# Patient Record
Sex: MALE | Race: WHITE | Employment: OTHER
[De-identification: names, ages, dates, MRNs, and addresses within clinical notes are randomized per-mention and may not be internally consistent; named-entity substitution may affect disease eponyms.]

---

## 2017-01-03 ENCOUNTER — OFFICE VISIT (OUTPATIENT)
Dept: GASTROENTEROLOGY | Facility: CLINIC | Age: 72
End: 2017-01-03

## 2017-01-03 VITALS
OXYGEN SATURATION: 95 % | HEART RATE: 67 BPM | SYSTOLIC BLOOD PRESSURE: 136 MMHG | DIASTOLIC BLOOD PRESSURE: 70 MMHG | BODY MASS INDEX: 25.08 KG/M2 | TEMPERATURE: 97.8 F | RESPIRATION RATE: 14 BRPM | WEIGHT: 150.5 LBS | HEIGHT: 65 IN

## 2017-01-03 DIAGNOSIS — L98.0 PYOGENIC GRANULOMA: ICD-10-CM

## 2017-01-03 DIAGNOSIS — K20.90 ESOPHAGITIS: ICD-10-CM

## 2017-01-03 DIAGNOSIS — K29.70 GASTRITIS WITHOUT BLEEDING, UNSPECIFIED CHRONICITY, UNSPECIFIED GASTRITIS TYPE: Primary | ICD-10-CM

## 2017-01-03 DIAGNOSIS — R13.10 DYSPHAGIA, UNSPECIFIED TYPE: ICD-10-CM

## 2017-01-03 DIAGNOSIS — R11.0 NAUSEA: ICD-10-CM

## 2017-01-03 PROCEDURE — 99214 OFFICE O/P EST MOD 30 MIN: CPT | Performed by: INTERNAL MEDICINE

## 2017-01-03 ASSESSMENT — ENCOUNTER SYMPTOMS
CONSTIPATION: 0
RESPIRATORY NEGATIVE: 1
ABDOMINAL PAIN: 0
BLOOD IN STOOL: 0
ABDOMINAL DISTENTION: 0
VOMITING: 0
NAUSEA: 1
ANAL BLEEDING: 0
DIARRHEA: 0
ALLERGIC/IMMUNOLOGIC NEGATIVE: 1
BACK PAIN: 1
RECTAL PAIN: 0

## 2017-03-01 ENCOUNTER — OFFICE VISIT (OUTPATIENT)
Dept: GASTROENTEROLOGY | Facility: CLINIC | Age: 72
End: 2017-03-01

## 2017-03-01 VITALS
OXYGEN SATURATION: 96 % | WEIGHT: 150.8 LBS | BODY MASS INDEX: 25.12 KG/M2 | HEIGHT: 65 IN | RESPIRATION RATE: 14 BRPM | HEART RATE: 64 BPM | SYSTOLIC BLOOD PRESSURE: 116 MMHG | TEMPERATURE: 98.1 F | DIASTOLIC BLOOD PRESSURE: 61 MMHG

## 2017-03-01 DIAGNOSIS — R11.0 NAUSEA: ICD-10-CM

## 2017-03-01 DIAGNOSIS — R13.10 DYSPHAGIA, UNSPECIFIED TYPE: ICD-10-CM

## 2017-03-01 DIAGNOSIS — K29.70 GASTRITIS WITHOUT BLEEDING, UNSPECIFIED CHRONICITY, UNSPECIFIED GASTRITIS TYPE: ICD-10-CM

## 2017-03-01 DIAGNOSIS — K20.90 ESOPHAGITIS: ICD-10-CM

## 2017-03-01 DIAGNOSIS — D17.79 LIPOMA OF OTHER SPECIFIED SITES: ICD-10-CM

## 2017-03-01 DIAGNOSIS — K21.9 GASTROESOPHAGEAL REFLUX DISEASE WITHOUT ESOPHAGITIS: ICD-10-CM

## 2017-03-01 DIAGNOSIS — D13.2 BRUNNER'S GLAND ADENOMA: Primary | ICD-10-CM

## 2017-03-01 PROCEDURE — G8484 FLU IMMUNIZE NO ADMIN: HCPCS | Performed by: INTERNAL MEDICINE

## 2017-03-01 PROCEDURE — 1036F TOBACCO NON-USER: CPT | Performed by: INTERNAL MEDICINE

## 2017-03-01 PROCEDURE — G8599 NO ASA/ANTIPLAT THER USE RNG: HCPCS | Performed by: INTERNAL MEDICINE

## 2017-03-01 PROCEDURE — 3017F COLORECTAL CA SCREEN DOC REV: CPT | Performed by: INTERNAL MEDICINE

## 2017-03-01 PROCEDURE — 1123F ACP DISCUSS/DSCN MKR DOCD: CPT | Performed by: INTERNAL MEDICINE

## 2017-03-01 PROCEDURE — G8420 CALC BMI NORM PARAMETERS: HCPCS | Performed by: INTERNAL MEDICINE

## 2017-03-01 PROCEDURE — 99214 OFFICE O/P EST MOD 30 MIN: CPT | Performed by: INTERNAL MEDICINE

## 2017-03-01 PROCEDURE — 4040F PNEUMOC VAC/ADMIN/RCVD: CPT | Performed by: INTERNAL MEDICINE

## 2017-03-01 PROCEDURE — G8427 DOCREV CUR MEDS BY ELIG CLIN: HCPCS | Performed by: INTERNAL MEDICINE

## 2017-03-01 ASSESSMENT — ENCOUNTER SYMPTOMS
BACK PAIN: 1
VOMITING: 0
TROUBLE SWALLOWING: 0
GASTROINTESTINAL NEGATIVE: 1
DIARRHEA: 0
RECTAL PAIN: 0
ABDOMINAL DISTENTION: 0
ABDOMINAL PAIN: 0
NAUSEA: 0
BLOOD IN STOOL: 0
ANAL BLEEDING: 0
RESPIRATORY NEGATIVE: 1
CONSTIPATION: 0
ALLERGIC/IMMUNOLOGIC NEGATIVE: 1

## 2017-06-02 ENCOUNTER — HOSPITAL ENCOUNTER (OUTPATIENT)
Facility: MEDICAL CENTER | Age: 72
End: 2017-06-02
Payer: MEDICARE

## 2017-06-02 DIAGNOSIS — C88.4 MALT (MUCOSA ASSOCIATED LYMPHOID TISSUE) (HCC): Primary | ICD-10-CM

## 2017-06-05 ENCOUNTER — TELEPHONE (OUTPATIENT)
Dept: ONCOLOGY | Age: 72
End: 2017-06-05

## 2017-06-05 ENCOUNTER — HOSPITAL ENCOUNTER (OUTPATIENT)
Facility: MEDICAL CENTER | Age: 72
Discharge: HOME | End: 2017-06-05
Payer: MEDICARE

## 2017-06-05 ENCOUNTER — OFFICE VISIT (OUTPATIENT)
Dept: ONCOLOGY | Age: 72
End: 2017-06-05
Payer: MEDICARE

## 2017-06-05 VITALS
DIASTOLIC BLOOD PRESSURE: 63 MMHG | WEIGHT: 157.8 LBS | TEMPERATURE: 98.2 F | RESPIRATION RATE: 18 BRPM | SYSTOLIC BLOOD PRESSURE: 115 MMHG | BODY MASS INDEX: 26.26 KG/M2 | HEART RATE: 62 BPM

## 2017-06-05 DIAGNOSIS — K29.70 GASTRITIS WITHOUT BLEEDING, UNSPECIFIED CHRONICITY, UNSPECIFIED GASTRITIS TYPE: ICD-10-CM

## 2017-06-05 DIAGNOSIS — C88.4 MALT (MUCOSA ASSOCIATED LYMPHOID TISSUE) (HCC): ICD-10-CM

## 2017-06-05 DIAGNOSIS — C88.4 MALT (MUCOSA ASSOCIATED LYMPHOID TISSUE) (HCC): Primary | ICD-10-CM

## 2017-06-05 LAB
ABSOLUTE EOS #: 0.3 K/UL (ref 0–0.4)
ABSOLUTE LYMPH #: 3.3 K/UL (ref 1–4.8)
ABSOLUTE MONO #: 1 K/UL (ref 0.2–0.8)
ALBUMIN SERPL-MCNC: 4 G/DL (ref 3.5–5.2)
ALBUMIN/GLOBULIN RATIO: ABNORMAL (ref 1–2.5)
ALP BLD-CCNC: 66 U/L (ref 40–129)
ALT SERPL-CCNC: 7 U/L (ref 5–41)
ANION GAP SERPL CALCULATED.3IONS-SCNC: 12 MMOL/L (ref 9–17)
AST SERPL-CCNC: 9 U/L
BASOPHILS # BLD: 1 %
BASOPHILS ABSOLUTE: 0.1 K/UL (ref 0–0.2)
BILIRUB SERPL-MCNC: 0.28 MG/DL (ref 0.3–1.2)
BUN BLDV-MCNC: 28 MG/DL (ref 8–23)
BUN/CREAT BLD: 21 (ref 9–20)
CALCIUM SERPL-MCNC: 8.9 MG/DL (ref 8.6–10.4)
CHLORIDE BLD-SCNC: 103 MMOL/L (ref 98–107)
CO2: 26 MMOL/L (ref 20–31)
CREAT SERPL-MCNC: 1.31 MG/DL (ref 0.7–1.2)
DIFFERENTIAL TYPE: ABNORMAL
EOSINOPHILS RELATIVE PERCENT: 3 %
GFR AFRICAN AMERICAN: >60 ML/MIN
GFR NON-AFRICAN AMERICAN: 54 ML/MIN
GFR SERPL CREATININE-BSD FRML MDRD: ABNORMAL ML/MIN/{1.73_M2}
GFR SERPL CREATININE-BSD FRML MDRD: ABNORMAL ML/MIN/{1.73_M2}
GLUCOSE BLD-MCNC: 91 MG/DL (ref 70–99)
HCT VFR BLD CALC: 36 % (ref 41–53)
HEMOGLOBIN: 12 G/DL (ref 13.5–17.5)
LYMPHOCYTES # BLD: 32 %
MCH RBC QN AUTO: 30.1 PG (ref 26–34)
MCHC RBC AUTO-ENTMCNC: 33.3 G/DL (ref 31–37)
MCV RBC AUTO: 90.2 FL (ref 80–100)
MONOCYTES # BLD: 10 %
PDW BLD-RTO: 16.8 % (ref 11.5–14.5)
PLATELET # BLD: 349 K/UL (ref 130–400)
PLATELET ESTIMATE: ABNORMAL
PMV BLD AUTO: 6.3 FL (ref 6–12)
POTASSIUM SERPL-SCNC: 4.8 MMOL/L (ref 3.7–5.3)
RBC # BLD: 3.99 M/UL (ref 4.5–5.9)
RBC # BLD: ABNORMAL 10*6/UL
SEG NEUTROPHILS: 54 %
SEGMENTED NEUTROPHILS ABSOLUTE COUNT: 5.6 K/UL (ref 1.8–7.7)
SODIUM BLD-SCNC: 141 MMOL/L (ref 135–144)
TOTAL PROTEIN: 6.5 G/DL (ref 6.4–8.3)
WBC # BLD: 10.3 K/UL (ref 3.5–11)
WBC # BLD: ABNORMAL 10*3/UL

## 2017-06-05 PROCEDURE — 3017F COLORECTAL CA SCREEN DOC REV: CPT | Performed by: INTERNAL MEDICINE

## 2017-06-05 PROCEDURE — 80053 COMPREHEN METABOLIC PANEL: CPT

## 2017-06-05 PROCEDURE — 4040F PNEUMOC VAC/ADMIN/RCVD: CPT | Performed by: INTERNAL MEDICINE

## 2017-06-05 PROCEDURE — 99212 OFFICE O/P EST SF 10 MIN: CPT

## 2017-06-05 PROCEDURE — 1123F ACP DISCUSS/DSCN MKR DOCD: CPT | Performed by: INTERNAL MEDICINE

## 2017-06-05 PROCEDURE — 99214 OFFICE O/P EST MOD 30 MIN: CPT | Performed by: INTERNAL MEDICINE

## 2017-06-05 PROCEDURE — G8599 NO ASA/ANTIPLAT THER USE RNG: HCPCS | Performed by: INTERNAL MEDICINE

## 2017-06-05 PROCEDURE — 36415 COLL VENOUS BLD VENIPUNCTURE: CPT

## 2017-06-05 PROCEDURE — 85025 COMPLETE CBC W/AUTO DIFF WBC: CPT

## 2017-06-05 PROCEDURE — G8427 DOCREV CUR MEDS BY ELIG CLIN: HCPCS | Performed by: INTERNAL MEDICINE

## 2017-06-05 PROCEDURE — 1036F TOBACCO NON-USER: CPT | Performed by: INTERNAL MEDICINE

## 2017-06-05 PROCEDURE — G8420 CALC BMI NORM PARAMETERS: HCPCS | Performed by: INTERNAL MEDICINE

## 2017-06-05 RX ORDER — OMEPRAZOLE 20 MG/1
CAPSULE, DELAYED RELEASE ORAL
Qty: 180 CAPSULE | Refills: 3 | Status: SHIPPED | OUTPATIENT
Start: 2017-06-05 | End: 2018-09-26

## 2017-10-11 ENCOUNTER — OFFICE VISIT (OUTPATIENT)
Dept: GASTROENTEROLOGY | Age: 72
End: 2017-10-11
Payer: MEDICARE

## 2017-10-11 VITALS
HEIGHT: 68 IN | BODY MASS INDEX: 23.95 KG/M2 | RESPIRATION RATE: 12 BRPM | SYSTOLIC BLOOD PRESSURE: 174 MMHG | WEIGHT: 158 LBS | OXYGEN SATURATION: 96 % | DIASTOLIC BLOOD PRESSURE: 78 MMHG | HEART RATE: 74 BPM | TEMPERATURE: 98.1 F

## 2017-10-11 DIAGNOSIS — D13.2 BRUNNER'S GLAND ADENOMA: ICD-10-CM

## 2017-10-11 DIAGNOSIS — R11.0 NAUSEA: ICD-10-CM

## 2017-10-11 DIAGNOSIS — C88.4 MALT (MUCOSA ASSOCIATED LYMPHOID TISSUE) (HCC): Primary | ICD-10-CM

## 2017-10-11 PROCEDURE — G8427 DOCREV CUR MEDS BY ELIG CLIN: HCPCS | Performed by: INTERNAL MEDICINE

## 2017-10-11 PROCEDURE — 4040F PNEUMOC VAC/ADMIN/RCVD: CPT | Performed by: INTERNAL MEDICINE

## 2017-10-11 PROCEDURE — 1123F ACP DISCUSS/DSCN MKR DOCD: CPT | Performed by: INTERNAL MEDICINE

## 2017-10-11 PROCEDURE — 3017F COLORECTAL CA SCREEN DOC REV: CPT | Performed by: INTERNAL MEDICINE

## 2017-10-11 PROCEDURE — G8599 NO ASA/ANTIPLAT THER USE RNG: HCPCS | Performed by: INTERNAL MEDICINE

## 2017-10-11 PROCEDURE — 99214 OFFICE O/P EST MOD 30 MIN: CPT | Performed by: INTERNAL MEDICINE

## 2017-10-11 PROCEDURE — 4004F PT TOBACCO SCREEN RCVD TLK: CPT | Performed by: INTERNAL MEDICINE

## 2017-10-11 PROCEDURE — G8484 FLU IMMUNIZE NO ADMIN: HCPCS | Performed by: INTERNAL MEDICINE

## 2017-10-11 PROCEDURE — G8420 CALC BMI NORM PARAMETERS: HCPCS | Performed by: INTERNAL MEDICINE

## 2017-10-11 ASSESSMENT — ENCOUNTER SYMPTOMS
NAUSEA: 1
ABDOMINAL DISTENTION: 0
TROUBLE SWALLOWING: 0
RESPIRATORY NEGATIVE: 1
DIARRHEA: 0
RECTAL PAIN: 0
ANAL BLEEDING: 0
BLOOD IN STOOL: 0
ALLERGIC/IMMUNOLOGIC NEGATIVE: 1
ABDOMINAL PAIN: 0
CONSTIPATION: 1
VOMITING: 0
BACK PAIN: 1

## 2017-10-11 NOTE — PROGRESS NOTES
Subjective:      Patient ID: Mckenna Carrington is a 67 y.o. male. HPI   Dr. Brennan Leventhal our mutual patient Mckenna Carrington was seen  for   1. MALT (mucosa associated lymphoid tissue) ([facility] [address]    2. Brunner's gland adenoma    3. Nausea     . Seen in my office as a f/u after 7  Months  He has been having some sig nausea  He has no sig vomiting  Has hx of brain tumor in past and has been operated long time ago  Has no weight loss  Had EGD earlier this year  Has some anxiety issues as well  No abd pains  Has some constipation  He has fentanyl patch  He is also on Vicodin      Past Medical, Family, and Social History reviewed and does contribute to the patient presenting condition. patient\"s PMH/PSH,SH,PSYCH hx, MEDs, ALLERGIES, and ROS was all reviewed and updated ion the appropriate sections      Review of Systems   Constitutional: Negative. HENT: Positive for dental problem. Negative for trouble swallowing (denies). Eyes: Positive for visual disturbance. States eye issue started this morning. Hx of cat sx in April   Respiratory: Negative. Cardiovascular: Negative. Gastrointestinal: Positive for constipation and nausea. Negative for abdominal distention, abdominal pain, anal bleeding, blood in stool, diarrhea, rectal pain and vomiting. Denies   Endocrine: Negative. Genitourinary: Negative. Musculoskeletal: Positive for arthralgias and back pain. Skin: Negative. Allergic/Immunologic: Negative. Neurological: Negative. Hematological: Bruises/bleeds easily. Psychiatric/Behavioral: Negative. Reviewed and agree  Objective:   Physical Exam   Constitutional: He is oriented to person, place, and time. He appears well-developed and well-nourished. Anxious    HENT:   Head: Normocephalic and atraumatic. Eyes: Conjunctivae and EOM are normal. Pupils are equal, round, and reactive to light. Neck: Normal range of motion. Neck supple.    Cardiovascular: Normal rate and regular

## 2017-10-19 DIAGNOSIS — C88.4 MALT (MUCOSA ASSOCIATED LYMPHOID TISSUE) (HCC): Primary | ICD-10-CM

## 2017-10-19 DIAGNOSIS — R11.2 NAUSEA AND VOMITING, INTRACTABILITY OF VOMITING NOT SPECIFIED, UNSPECIFIED VOMITING TYPE: ICD-10-CM

## 2017-10-19 DIAGNOSIS — D13.2 BRUNNER'S GLAND ADENOMA: ICD-10-CM

## 2017-11-28 DIAGNOSIS — C88.4 MALT (MUCOSA ASSOCIATED LYMPHOID TISSUE) (HCC): Primary | ICD-10-CM

## 2017-12-04 ENCOUNTER — HOSPITAL ENCOUNTER (OUTPATIENT)
Facility: MEDICAL CENTER | Age: 72
Discharge: HOME | End: 2017-12-04
Payer: MEDICARE

## 2017-12-04 ENCOUNTER — HOSPITAL ENCOUNTER (OUTPATIENT)
Dept: CT IMAGING | Age: 72
End: 2017-12-04
Payer: MEDICARE

## 2017-12-04 ENCOUNTER — HOSPITAL ENCOUNTER (OUTPATIENT)
Dept: CT IMAGING | Age: 72
Discharge: HOME | End: 2017-12-04
Payer: MEDICARE

## 2017-12-04 DIAGNOSIS — K29.70 GASTRITIS WITHOUT BLEEDING, UNSPECIFIED CHRONICITY, UNSPECIFIED GASTRITIS TYPE: ICD-10-CM

## 2017-12-04 DIAGNOSIS — C88.4 MALT (MUCOSA ASSOCIATED LYMPHOID TISSUE) (HCC): ICD-10-CM

## 2017-12-04 LAB
ABSOLUTE EOS #: 0.2 K/UL (ref 0–0.4)
ABSOLUTE IMMATURE GRANULOCYTE: ABNORMAL K/UL (ref 0–0.3)
ABSOLUTE LYMPH #: 3.3 K/UL (ref 1–4.8)
ABSOLUTE MONO #: 0.9 K/UL (ref 0.2–0.8)
ALBUMIN SERPL-MCNC: 4.6 G/DL (ref 3.5–5.2)
ALBUMIN/GLOBULIN RATIO: ABNORMAL (ref 1–2.5)
ALP BLD-CCNC: 70 U/L (ref 40–129)
ALT SERPL-CCNC: 11 U/L (ref 5–41)
ANION GAP SERPL CALCULATED.3IONS-SCNC: 14 MMOL/L (ref 9–17)
AST SERPL-CCNC: 17 U/L
BASOPHILS # BLD: 1 % (ref 0–2)
BASOPHILS ABSOLUTE: 0.1 K/UL (ref 0–0.2)
BILIRUB SERPL-MCNC: 0.43 MG/DL (ref 0.3–1.2)
BUN BLDV-MCNC: 20 MG/DL (ref 8–23)
BUN/CREAT BLD: 16 (ref 9–20)
CALCIUM SERPL-MCNC: 9.5 MG/DL (ref 8.6–10.4)
CHLORIDE BLD-SCNC: 98 MMOL/L (ref 98–107)
CO2: 26 MMOL/L (ref 20–31)
CREAT SERPL-MCNC: 1.27 MG/DL (ref 0.7–1.2)
DIFFERENTIAL TYPE: ABNORMAL
EOSINOPHILS RELATIVE PERCENT: 2 % (ref 1–4)
GFR AFRICAN AMERICAN: >60 ML/MIN
GFR NON-AFRICAN AMERICAN: 56 ML/MIN
GFR SERPL CREATININE-BSD FRML MDRD: ABNORMAL ML/MIN/{1.73_M2}
GFR SERPL CREATININE-BSD FRML MDRD: ABNORMAL ML/MIN/{1.73_M2}
GLUCOSE BLD-MCNC: 107 MG/DL (ref 70–99)
HCT VFR BLD CALC: 37.7 % (ref 41–53)
HEMOGLOBIN: 12.4 G/DL (ref 13.5–17.5)
IMMATURE GRANULOCYTES: ABNORMAL %
LACTATE DEHYDROGENASE: 221 U/L (ref 135–225)
LYMPHOCYTES # BLD: 30 % (ref 24–44)
MCH RBC QN AUTO: 29.9 PG (ref 26–34)
MCHC RBC AUTO-ENTMCNC: 32.9 G/DL (ref 31–37)
MCV RBC AUTO: 90.9 FL (ref 80–100)
MONOCYTES # BLD: 8 % (ref 1–7)
PDW BLD-RTO: 17.5 % (ref 11.5–14.5)
PLATELET # BLD: 321 K/UL (ref 130–400)
PLATELET ESTIMATE: ABNORMAL
PMV BLD AUTO: 7 FL (ref 6–12)
POTASSIUM SERPL-SCNC: 4.7 MMOL/L (ref 3.7–5.3)
RBC # BLD: 4.15 M/UL (ref 4.5–5.9)
RBC # BLD: ABNORMAL 10*6/UL
SEG NEUTROPHILS: 59 % (ref 36–66)
SEGMENTED NEUTROPHILS ABSOLUTE COUNT: 6.4 K/UL (ref 1.8–7.7)
SODIUM BLD-SCNC: 138 MMOL/L (ref 135–144)
TOTAL PROTEIN: 7.3 G/DL (ref 6.4–8.3)
WBC # BLD: 10.8 K/UL (ref 3.5–11)
WBC # BLD: ABNORMAL 10*3/UL

## 2017-12-04 PROCEDURE — 6360000004 HC RX CONTRAST MEDICATION: Performed by: INTERNAL MEDICINE

## 2017-12-04 PROCEDURE — 71260 CT THORAX DX C+: CPT

## 2017-12-04 PROCEDURE — 85025 COMPLETE CBC W/AUTO DIFF WBC: CPT

## 2017-12-04 PROCEDURE — 83615 LACTATE (LD) (LDH) ENZYME: CPT

## 2017-12-04 PROCEDURE — 36415 COLL VENOUS BLD VENIPUNCTURE: CPT

## 2017-12-04 PROCEDURE — 74177 CT ABD & PELVIS W/CONTRAST: CPT

## 2017-12-04 PROCEDURE — 80053 COMPREHEN METABOLIC PANEL: CPT

## 2017-12-04 PROCEDURE — 2580000003 HC RX 258: Performed by: INTERNAL MEDICINE

## 2017-12-04 RX ORDER — 0.9 % SODIUM CHLORIDE 0.9 %
50 INTRAVENOUS SOLUTION INTRAVENOUS ONCE
Status: COMPLETED | OUTPATIENT
Start: 2017-12-04 | End: 2017-12-04

## 2017-12-04 RX ORDER — SODIUM CHLORIDE 0.9 % (FLUSH) 0.9 %
10 SYRINGE (ML) INJECTION PRN
Status: DISCONTINUED | OUTPATIENT
Start: 2017-12-04 | End: 2017-12-07 | Stop reason: HOSPADM

## 2017-12-04 RX ADMIN — IOPAMIDOL 125 ML: 755 INJECTION, SOLUTION INTRAVENOUS at 15:42

## 2017-12-04 RX ADMIN — IOHEXOL 50 ML: 240 INJECTION, SOLUTION INTRATHECAL; INTRAVASCULAR; INTRAVENOUS; ORAL at 15:42

## 2017-12-04 RX ADMIN — SODIUM CHLORIDE 50 ML: 9 INJECTION, SOLUTION INTRAVENOUS at 15:42

## 2017-12-04 RX ADMIN — Medication 10 ML: at 15:42

## 2017-12-08 ENCOUNTER — HOSPITAL ENCOUNTER (OUTPATIENT)
Facility: MEDICAL CENTER | Age: 72
End: 2017-12-08
Payer: MEDICARE

## 2017-12-11 ENCOUNTER — OFFICE VISIT (OUTPATIENT)
Dept: ONCOLOGY | Age: 72
End: 2017-12-11
Payer: MEDICARE

## 2017-12-11 ENCOUNTER — TELEPHONE (OUTPATIENT)
Dept: ONCOLOGY | Age: 72
End: 2017-12-11

## 2017-12-11 VITALS
TEMPERATURE: 98.1 F | BODY MASS INDEX: 23.19 KG/M2 | RESPIRATION RATE: 18 BRPM | HEART RATE: 67 BPM | WEIGHT: 152.5 LBS | SYSTOLIC BLOOD PRESSURE: 142 MMHG | DIASTOLIC BLOOD PRESSURE: 71 MMHG

## 2017-12-11 DIAGNOSIS — C88.4 MALT (MUCOSA ASSOCIATED LYMPHOID TISSUE) (HCC): Primary | ICD-10-CM

## 2017-12-11 DIAGNOSIS — I77.9 LEFT-SIDED CAROTID ARTERY DISEASE (HCC): ICD-10-CM

## 2017-12-11 PROCEDURE — G8599 NO ASA/ANTIPLAT THER USE RNG: HCPCS | Performed by: INTERNAL MEDICINE

## 2017-12-11 PROCEDURE — G8427 DOCREV CUR MEDS BY ELIG CLIN: HCPCS | Performed by: INTERNAL MEDICINE

## 2017-12-11 PROCEDURE — G8420 CALC BMI NORM PARAMETERS: HCPCS | Performed by: INTERNAL MEDICINE

## 2017-12-11 PROCEDURE — 99214 OFFICE O/P EST MOD 30 MIN: CPT | Performed by: INTERNAL MEDICINE

## 2017-12-11 PROCEDURE — 4040F PNEUMOC VAC/ADMIN/RCVD: CPT | Performed by: INTERNAL MEDICINE

## 2017-12-11 PROCEDURE — G8484 FLU IMMUNIZE NO ADMIN: HCPCS | Performed by: INTERNAL MEDICINE

## 2017-12-11 PROCEDURE — 1123F ACP DISCUSS/DSCN MKR DOCD: CPT | Performed by: INTERNAL MEDICINE

## 2017-12-11 PROCEDURE — 3017F COLORECTAL CA SCREEN DOC REV: CPT | Performed by: INTERNAL MEDICINE

## 2017-12-11 PROCEDURE — 99211 OFF/OP EST MAY X REQ PHY/QHP: CPT

## 2017-12-11 PROCEDURE — 4004F PT TOBACCO SCREEN RCVD TLK: CPT | Performed by: INTERNAL MEDICINE

## 2017-12-11 NOTE — PROGRESS NOTES
abnormality. Surgical History   has a past surgical history that includes Cardiac catheterization; Carotid endarterectomy; Cervical discectomy; Lumbar disc surgery; arthroplasty; arthroplasty; Tonsillectomy; Inguinal hernia repair; Brain tumor excision (2008); Cervical spine surgery (6-25-13); Coronary artery bypass graft (3/6/2014); Upper gastrointestinal endoscopy (4/30/2015); Upper gastrointestinal endoscopy (5/18/16); and Upper gastrointestinal endoscopy (01/25/2017). Home Medications  has a current medication list which includes the following prescription(s): omeprazole, atorvastatin, docusate, ondansetron, metoprolol tartrate, albuterol sulfate hfa, fentanyl, and aspirin. Allergies:  Vancomycin      Review of Systems :      Constitutional: Moderate fatigue, nausea   HEENT: negative for sore mouth, sore throat, hoarseness and voice change; blind in left eye - as noted. Respiratory: negative for cough , sputum, dyspnea, wheezing, hemoptysis, chest pain   Cardiovascular: negative for chest pain, dyspnea, palpitations, orthopnea, PND   Gastrointestinal: Chronic nausea, no major abdomen pain, no melena,  Genitourinary: negative for frequency, dysuria, nocturia, urinary incontinence, and hematuria   Integument: negative for rash, skin lesions, bruises.    Hematologic/Lymphatic: negative for easy bruising, bleeding, lymphadenopathy, petechiae and swelling/edema   Endocrine: negative for heat or cold intolerance, tremor, weight changes, change in bowel habits and hair loss   Musculoskeletal: negative for myalgias, arthralgias, pain, joint swelling,and bone pain   Neurological: negative for headaches, dizziness, seizures, weakness, numbness      Physical Examination :       BP (!) 142/71 (Site: Left Arm, Position: Sitting)   Pulse 67   Temp 98.1 °F (36.7 °C) (Oral)   Resp 18   Wt 152 lb 8 oz (69.2 kg)   BMI 23.19 kg/m²     Performance Status:Estimated performance status is ECOG 1    General appearance - looks very frail, he is not in distress  Mental status - alert and cooperative   Neck - supple, no significant adenopathy ,trach is central; carotid murmer both sides  Lymphatics - no palpable lymphadenopathy, no hepatosplenomegaly   Chest - clear to auscultation, no wheezes, rales or rhonchi, symmetric air entry   Heart - normal rate, regular rhythm, normal S1, S2, no murmurs, rubs, clicks or gallops   Abdomen - soft, nontender, nondistended, no masses or organomegaly   Neurological - alert, oriented, normal speech, no focal findings or movement disorder noted   Musculoskeletal - no joint tenderness, deformity or swelling   Extremities - peripheral pulses normal, no pedal edema, no clubbing or cyanosis   Skin - normal coloration and turgor, no rashes, no suspicious skin lesions noted    REVIEW OF LABORATORY DATA:    Lab Results   Component Value Date     12/04/2017    K 4.7 12/04/2017    CL 98 12/04/2017    CO2 26 12/04/2017    BUN 20 12/04/2017    CREATININE 1.27 12/04/2017    GLUCOSE 107 12/04/2017    CALCIUM 9.5 12/04/2017     Lab Results   Component Value Date    WBC 10.8 12/04/2017    HGB 12.4 12/04/2017    HCT 37.7 12/04/2017    MCV 90.9 12/04/2017     12/04/2017       PATHOLOGY:      REVIEW OF RADIOLOGICAL RESULTS:  12/04/2017 CT CHEST ABDOMEN PELVIS IMPRESSION:  1. Interval resolution of previously described scattered ground-glass   densities. 2. Stable 3 mm probably calcified peripheral left lower lobe lung base nodule. 3. Mild centrilobular emphysema. 1. No evidence for metastatic disease. 2. Degenerative changes lumbar spine with posterior fusion L4-5. Assessment:    1. Gastric MALTOMA, s/p EMR with postive margins,H. Pylori negative by IHC during scope but positive antigen test in the stool. Case was discussed in the tumor board. Plans changes from radiation to H. pylori eradication treatment by triple therapy. Started amoxicillin/Biaxin and Protonix on 1 September.

## 2017-12-11 NOTE — TELEPHONE ENCOUNTER
[facility] ARRIVED TO CLINIC AMBULATORY, DR. Atkinson Sink IN TO SEE PATIENT. ORDERS RECEIVED, FOLLOW UP 6 MONTHS WITH LABS AT  [facility].  06/11/2018 @ 1:20, AVS PRINTED AND GIVEN TO PATIENT WITH INSTRUCTIONS. PATIENT DISCHARGED AMBULATORY FROM PRACTICE.

## 2017-12-11 NOTE — LETTER
Cynthia Fabian MD    12/11/2017     [id number] [address]  [place] [id number]    Dear Doctors : Thank you for referring Robert Friend, 1945, to me for evaluation. Below are the relevant portions of my assessment and plan of care. Reason for the visit:   Chief Complaint   Patient presents with    Follow-up     Patient is here to review status of disease       Pertinent Clinical Problems/ Treatments:    1. Gastric Maltoma, stage IE,PET scan and bone marrow biopsy negative,  2. H. Pylori negative by histology,but + by stool antigen test  3. CAD, CK D, anemia  4. Treatment: Amoxicillin plus Biaxin plus Protonix, started  September 1 2015  5. Observation/ surveillance     Summary of the case/HPI :    He is a 65-year-old patient who is poor historian and with multiple comorbidities including CAD presented in the last few months with chronic nausea as well as heartburn. Denied any bleeding or melena, he had no fever or chills or night sweats. 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 He was referred to [facility] for EUS and Endo mucosal resection which was done in June. Final path consisted with the same histology and margins were positive. Tested for H. pylori and that was positive. The decision was to treat him with anti-H. pylori treatment   He attained good remission, and was observed since then. 12/2017 he developed amaurosis fugax, arthrosclerotic plaque on left carotid artery, plan is carotid endartectomy. INTERIM HISTORY: The patent is here for a follow up. He reports he woke up recently with blindness in left eye,( amaurosis fugax) , workup suggested arthrosclerotic plaque on both arteries, worse on the left side, plan is carotid endartectomy. He continues to smoke, but planning to quit.      Past Medical History has a past medical history of Arthritis; Brunner's gland adenoma; CAD (coronary artery disease); Dysphagia; Esophagitis; Gastritis; GERD (gastroesophageal reflux disease); Hypertension; Lipoma; Myocardial infarction; Nausea & vomiting; Pyogenic granuloma; and Vascular abnormality. Surgical History   has a past surgical history that includes Cardiac catheterization; Carotid endarterectomy; Cervical discectomy; Lumbar disc surgery; arthroplasty; arthroplasty; Tonsillectomy; Inguinal hernia repair; Brain tumor excision (2008); Cervical spine surgery (6-25-13); Coronary artery bypass graft (3/6/2014); Upper gastrointestinal endoscopy (4/30/2015); Upper gastrointestinal endoscopy (5/18/16); and Upper gastrointestinal endoscopy (01/25/2017). Home Medications  has a current medication list which includes the following prescription(s): omeprazole, atorvastatin, docusate, ondansetron, metoprolol tartrate, albuterol sulfate hfa, fentanyl, and aspirin. Allergies:  Vancomycin      Review of Systems :      Constitutional: Moderate fatigue, nausea   HEENT: negative for sore mouth, sore throat, hoarseness and voice change; blind in left eye - as noted. Respiratory: negative for cough , sputum, dyspnea, wheezing, hemoptysis, chest pain   Cardiovascular: negative for chest pain, dyspnea, palpitations, orthopnea, PND   Gastrointestinal: Chronic nausea, no major abdomen pain, no melena,  Genitourinary: negative for frequency, dysuria, nocturia, urinary incontinence, and hematuria   Integument: negative for rash, skin lesions, bruises.    Hematologic/Lymphatic: negative for easy bruising, bleeding, lymphadenopathy, petechiae and swelling/edema   Endocrine: negative for heat or cold intolerance, tremor, weight changes, change in bowel habits and hair loss   Musculoskeletal: negative for myalgias, arthralgias, pain, joint swelling,and bone pain   Neurological: negative for headaches, dizziness, seizures, weakness, numbness 1. Gastric MALTOMA, s/p EMR with postive margins,H. Pylori negative by IHC during scope but positive antigen test in the stool. Case was discussed in the tumor board. Plans changes from radiation to H. pylori eradication treatment by triple therapy. Started amoxicillin/Biaxin and Protonix on 1 September. He finished antibiotics component of the treatment he will continue on PPI  2. EGD showed no active lymphoma, he was reassured. Based on CT, we will ask for a sooner follow up. We might have to repeat CT if EGD is negative    3. pulm infiltrate. Likely infectious   4. Multiple comorbidities in the form of CAD  5. Anemia and CK D          Plan:   1. We reviewed his recent CT which showed no evidence of disease. 2. Advising him to follow up with GI for regular endoscopy. 3. Advised him to follow with vascular for his carotid. 4. I asked him to continue with aspirin. 5. I counseled him on smoking cessation and he is going to try. 6. Return in 6 months with labs. Alison Agrawal MD  Hematologist/Medical Oncologist  Cell: [phone]                   If you have questions, please do not hesitate to call me. I look forward to following Luis Cousins along with you.     Sincerely,    Kylee Neri MD  Hematology/ Oncology  Cell [phone]

## 2017-12-12 ENCOUNTER — HOSPITAL ENCOUNTER (OUTPATIENT)
Dept: INTERVENTIONAL RADIOLOGY/VASCULAR | Age: 72
Setting detail: OBSERVATION
Discharge: HOME HEALTH | End: 2017-12-13
Attending: SURGERY | Admitting: SURGERY
Payer: MEDICARE

## 2017-12-12 DIAGNOSIS — I77.1 SUBCLAVIAN ARTERY STENOSIS, LEFT (HCC): ICD-10-CM

## 2017-12-12 DIAGNOSIS — I73.9 CLAUDICATION (HCC): ICD-10-CM

## 2017-12-12 LAB
ABSOLUTE EOS #: 0 K/UL (ref 0–0.4)
ABSOLUTE IMMATURE GRANULOCYTE: ABNORMAL K/UL (ref 0–0.3)
ABSOLUTE LYMPH #: 2.1 K/UL (ref 1–4.8)
ABSOLUTE MONO #: 0.5 K/UL (ref 0.2–0.8)
BASOPHILS # BLD: 1 % (ref 0–2)
BASOPHILS ABSOLUTE: 0.1 K/UL (ref 0–0.2)
BUN BLDV-MCNC: 16 MG/DL (ref 8–23)
CREAT SERPL-MCNC: 1.15 MG/DL (ref 0.7–1.2)
DIFFERENTIAL TYPE: ABNORMAL
EOSINOPHILS RELATIVE PERCENT: 1 % (ref 1–4)
GFR AFRICAN AMERICAN: >60 ML/MIN
GFR NON-AFRICAN AMERICAN: >60 ML/MIN
GFR SERPL CREATININE-BSD FRML MDRD: NORMAL ML/MIN/{1.73_M2}
GFR SERPL CREATININE-BSD FRML MDRD: NORMAL ML/MIN/{1.73_M2}
HCT VFR BLD CALC: 37 % (ref 41–53)
HEMOGLOBIN: 12.1 G/DL (ref 13.5–17.5)
IMMATURE GRANULOCYTES: ABNORMAL %
INR BLD: 1.1
LYMPHOCYTES # BLD: 23 % (ref 24–44)
MCH RBC QN AUTO: 29.6 PG (ref 26–34)
MCHC RBC AUTO-ENTMCNC: 32.6 G/DL (ref 31–37)
MCV RBC AUTO: 90.8 FL (ref 80–100)
MONOCYTES # BLD: 6 % (ref 1–7)
PARTIAL THROMBOPLASTIN TIME: 29.9 SEC (ref 23–31)
PDW BLD-RTO: 17.3 % (ref 11.5–14.5)
PLATELET # BLD: 312 K/UL (ref 130–400)
PLATELET # BLD: 326 K/UL (ref 130–400)
PLATELET ESTIMATE: ABNORMAL
PMV BLD AUTO: 6.8 FL (ref 6–12)
PROTHROMBIN TIME: 11.1 SEC (ref 9.7–11.6)
RBC # BLD: 4.07 M/UL (ref 4.5–5.9)
RBC # BLD: ABNORMAL 10*6/UL
SEG NEUTROPHILS: 69 % (ref 36–66)
SEGMENTED NEUTROPHILS ABSOLUTE COUNT: 6.2 K/UL (ref 1.8–7.7)
WBC # BLD: 8.9 K/UL (ref 3.5–11)
WBC # BLD: ABNORMAL 10*3/UL

## 2017-12-12 PROCEDURE — 82565 ASSAY OF CREATININE: CPT

## 2017-12-12 PROCEDURE — 99153 MOD SED SAME PHYS/QHP EA: CPT | Performed by: SURGERY

## 2017-12-12 PROCEDURE — 36140 INTRO NDL ICATH UPR/LXTR ART: CPT | Performed by: SURGERY

## 2017-12-12 PROCEDURE — 85025 COMPLETE CBC W/AUTO DIFF WBC: CPT

## 2017-12-12 PROCEDURE — 2580000003 HC RX 258: Performed by: SURGERY

## 2017-12-12 PROCEDURE — 7100000000 HC PACU RECOVERY - FIRST 15 MIN

## 2017-12-12 PROCEDURE — 37246 TRLUML BALO ANGIOP 1ST ART: CPT | Performed by: SURGERY

## 2017-12-12 PROCEDURE — 99152 MOD SED SAME PHYS/QHP 5/>YRS: CPT | Performed by: SURGERY

## 2017-12-12 PROCEDURE — 84520 ASSAY OF UREA NITROGEN: CPT

## 2017-12-12 PROCEDURE — 85610 PROTHROMBIN TIME: CPT

## 2017-12-12 PROCEDURE — 6370000000 HC RX 637 (ALT 250 FOR IP): Performed by: SURGERY

## 2017-12-12 PROCEDURE — 7100000001 HC PACU RECOVERY - ADDTL 15 MIN

## 2017-12-12 PROCEDURE — 75710 ARTERY X-RAYS ARM/LEG: CPT | Performed by: SURGERY

## 2017-12-12 PROCEDURE — 36415 COLL VENOUS BLD VENIPUNCTURE: CPT

## 2017-12-12 PROCEDURE — 6360000002 HC RX W HCPCS: Performed by: SURGERY

## 2017-12-12 PROCEDURE — 85730 THROMBOPLASTIN TIME PARTIAL: CPT

## 2017-12-12 PROCEDURE — G0378 HOSPITAL OBSERVATION PER HR: HCPCS

## 2017-12-12 PROCEDURE — C1769 GUIDE WIRE: HCPCS

## 2017-12-12 PROCEDURE — 6360000004 HC RX CONTRAST MEDICATION: Performed by: SURGERY

## 2017-12-12 RX ORDER — ALBUTEROL SULFATE 90 UG/1
2 AEROSOL, METERED RESPIRATORY (INHALATION) EVERY 6 HOURS PRN
Status: DISCONTINUED | OUTPATIENT
Start: 2017-12-12 | End: 2017-12-13 | Stop reason: HOSPADM

## 2017-12-12 RX ORDER — ATORVASTATIN CALCIUM 20 MG/1
20 TABLET, FILM COATED ORAL DAILY
Status: DISCONTINUED | OUTPATIENT
Start: 2017-12-13 | End: 2017-12-13 | Stop reason: HOSPADM

## 2017-12-12 RX ORDER — ALPRAZOLAM 0.25 MG/1
0.25 TABLET ORAL NIGHTLY PRN
Status: DISCONTINUED | OUTPATIENT
Start: 2017-12-12 | End: 2017-12-13 | Stop reason: HOSPADM

## 2017-12-12 RX ORDER — ONDANSETRON 2 MG/ML
4 INJECTION INTRAMUSCULAR; INTRAVENOUS EVERY 8 HOURS PRN
Status: DISCONTINUED | OUTPATIENT
Start: 2017-12-12 | End: 2017-12-13 | Stop reason: HOSPADM

## 2017-12-12 RX ORDER — FENTANYL CITRATE 50 UG/ML
INJECTION, SOLUTION INTRAMUSCULAR; INTRAVENOUS
Status: COMPLETED | OUTPATIENT
Start: 2017-12-12 | End: 2017-12-12

## 2017-12-12 RX ORDER — PANTOPRAZOLE SODIUM 40 MG/1
40 TABLET, DELAYED RELEASE ORAL
Status: DISCONTINUED | OUTPATIENT
Start: 2017-12-13 | End: 2017-12-13 | Stop reason: HOSPADM

## 2017-12-12 RX ORDER — MIDAZOLAM HYDROCHLORIDE 1 MG/ML
INJECTION INTRAMUSCULAR; INTRAVENOUS
Status: COMPLETED | OUTPATIENT
Start: 2017-12-12 | End: 2017-12-12

## 2017-12-12 RX ORDER — DEXTROSE AND SODIUM CHLORIDE 5; .45 G/100ML; G/100ML
INJECTION, SOLUTION INTRAVENOUS CONTINUOUS
Status: DISCONTINUED | OUTPATIENT
Start: 2017-12-12 | End: 2017-12-12

## 2017-12-12 RX ORDER — ACETAMINOPHEN 325 MG/1
650 TABLET ORAL EVERY 4 HOURS PRN
Status: DISCONTINUED | OUTPATIENT
Start: 2017-12-12 | End: 2017-12-13 | Stop reason: HOSPADM

## 2017-12-12 RX ORDER — SODIUM CHLORIDE 450 MG/100ML
INJECTION, SOLUTION INTRAVENOUS CONTINUOUS
Status: DISCONTINUED | OUTPATIENT
Start: 2017-12-12 | End: 2017-12-13 | Stop reason: HOSPADM

## 2017-12-12 RX ORDER — FENTANYL 75 UG/H
1 PATCH TRANSDERMAL
Status: DISCONTINUED | OUTPATIENT
Start: 2017-12-12 | End: 2017-12-13 | Stop reason: HOSPADM

## 2017-12-12 RX ORDER — DOCUSATE SODIUM 100 MG/1
100 CAPSULE, LIQUID FILLED ORAL 2 TIMES DAILY
Status: DISCONTINUED | OUTPATIENT
Start: 2017-12-13 | End: 2017-12-13 | Stop reason: HOSPADM

## 2017-12-12 RX ORDER — MORPHINE SULFATE 2 MG/ML
2 INJECTION, SOLUTION INTRAMUSCULAR; INTRAVENOUS
Status: DISCONTINUED | OUTPATIENT
Start: 2017-12-12 | End: 2017-12-13 | Stop reason: HOSPADM

## 2017-12-12 RX ORDER — ASPIRIN 81 MG/1
81 TABLET, CHEWABLE ORAL DAILY
Status: DISCONTINUED | OUTPATIENT
Start: 2017-12-13 | End: 2017-12-13 | Stop reason: HOSPADM

## 2017-12-12 RX ORDER — PROTAMINE SULFATE 10 MG/ML
INJECTION, SOLUTION INTRAVENOUS
Status: COMPLETED | OUTPATIENT
Start: 2017-12-12 | End: 2017-12-12

## 2017-12-12 RX ORDER — HYDROCODONE BITARTRATE AND ACETAMINOPHEN 5; 325 MG/1; MG/1
1 TABLET ORAL EVERY 4 HOURS PRN
Status: DISCONTINUED | OUTPATIENT
Start: 2017-12-12 | End: 2017-12-13 | Stop reason: HOSPADM

## 2017-12-12 RX ORDER — MORPHINE SULFATE 4 MG/ML
4 INJECTION, SOLUTION INTRAMUSCULAR; INTRAVENOUS
Status: DISCONTINUED | OUTPATIENT
Start: 2017-12-12 | End: 2017-12-12 | Stop reason: CLARIF

## 2017-12-12 RX ORDER — HEPARIN SODIUM 1000 [USP'U]/ML
INJECTION, SOLUTION INTRAVENOUS; SUBCUTANEOUS
Status: COMPLETED | OUTPATIENT
Start: 2017-12-12 | End: 2017-12-12

## 2017-12-12 RX ORDER — HYDROCODONE BITARTRATE AND ACETAMINOPHEN 5; 325 MG/1; MG/1
2 TABLET ORAL EVERY 4 HOURS PRN
Status: DISCONTINUED | OUTPATIENT
Start: 2017-12-12 | End: 2017-12-13 | Stop reason: HOSPADM

## 2017-12-12 RX ORDER — MORPHINE SULFATE 4 MG/ML
4 INJECTION, SOLUTION INTRAMUSCULAR; INTRAVENOUS
Status: DISCONTINUED | OUTPATIENT
Start: 2017-12-12 | End: 2017-12-13 | Stop reason: HOSPADM

## 2017-12-12 RX ORDER — ONDANSETRON 4 MG/1
4 TABLET, FILM COATED ORAL EVERY 8 HOURS PRN
Status: DISCONTINUED | OUTPATIENT
Start: 2017-12-12 | End: 2017-12-13 | Stop reason: HOSPADM

## 2017-12-12 RX ORDER — IODIXANOL 320 MG/ML
INJECTION, SOLUTION INTRAVASCULAR
Status: COMPLETED | OUTPATIENT
Start: 2017-12-12 | End: 2017-12-12

## 2017-12-12 RX ORDER — MORPHINE SULFATE 2 MG/ML
2 INJECTION, SOLUTION INTRAMUSCULAR; INTRAVENOUS
Status: DISCONTINUED | OUTPATIENT
Start: 2017-12-12 | End: 2017-12-12 | Stop reason: CLARIF

## 2017-12-12 RX ORDER — HYDROMORPHONE HCL 110MG/55ML
1 PATIENT CONTROLLED ANALGESIA SYRINGE INTRAVENOUS
Status: DISCONTINUED | OUTPATIENT
Start: 2017-12-12 | End: 2017-12-13 | Stop reason: HOSPADM

## 2017-12-12 RX ADMIN — HYDROMORPHONE HYDROCHLORIDE 1 MG: 2 INJECTION, SOLUTION INTRAMUSCULAR; INTRAVENOUS; SUBCUTANEOUS at 19:32

## 2017-12-12 RX ADMIN — FENTANYL CITRATE 50 MCG: 50 INJECTION, SOLUTION INTRAMUSCULAR; INTRAVENOUS at 16:08

## 2017-12-12 RX ADMIN — MORPHINE SULFATE 4 MG: 4 INJECTION, SOLUTION INTRAMUSCULAR; INTRAVENOUS at 23:07

## 2017-12-12 RX ADMIN — MORPHINE SULFATE 4 MG: 4 INJECTION, SOLUTION INTRAMUSCULAR; INTRAVENOUS at 20:57

## 2017-12-12 RX ADMIN — FENTANYL CITRATE 50 MCG: 50 INJECTION, SOLUTION INTRAMUSCULAR; INTRAVENOUS at 14:54

## 2017-12-12 RX ADMIN — PROTAMINE SULFATE 15 MG: 10 INJECTION, SOLUTION INTRAVENOUS at 15:50

## 2017-12-12 RX ADMIN — HYDROMORPHONE HYDROCHLORIDE 1 MG: 2 INJECTION, SOLUTION INTRAMUSCULAR; INTRAVENOUS; SUBCUTANEOUS at 01:00

## 2017-12-12 RX ADMIN — MORPHINE SULFATE 4 MG: 4 INJECTION, SOLUTION INTRAMUSCULAR; INTRAVENOUS at 18:43

## 2017-12-12 RX ADMIN — FENTANYL CITRATE 50 MCG: 50 INJECTION, SOLUTION INTRAMUSCULAR; INTRAVENOUS at 16:41

## 2017-12-12 RX ADMIN — HYDROMORPHONE HYDROCHLORIDE 1 MG: 2 INJECTION, SOLUTION INTRAMUSCULAR; INTRAVENOUS; SUBCUTANEOUS at 17:37

## 2017-12-12 RX ADMIN — HYDROMORPHONE HYDROCHLORIDE 1 MG: 2 INJECTION, SOLUTION INTRAMUSCULAR; INTRAVENOUS; SUBCUTANEOUS at 21:53

## 2017-12-12 RX ADMIN — METOPROLOL TARTRATE 6.25 MG: 25 TABLET ORAL at 21:01

## 2017-12-12 RX ADMIN — DEXTROSE AND SODIUM CHLORIDE: 5; 450 INJECTION, SOLUTION INTRAVENOUS at 13:19

## 2017-12-12 RX ADMIN — FENTANYL CITRATE 50 MCG: 50 INJECTION, SOLUTION INTRAMUSCULAR; INTRAVENOUS at 16:12

## 2017-12-12 RX ADMIN — HEPARIN SODIUM 5000 UNITS: 1000 INJECTION, SOLUTION INTRAVENOUS; SUBCUTANEOUS at 15:31

## 2017-12-12 RX ADMIN — SODIUM CHLORIDE: 4.5 INJECTION, SOLUTION INTRAVENOUS at 21:00

## 2017-12-12 RX ADMIN — IODIXANOL 100 ML: 320 INJECTION, SOLUTION INTRAVASCULAR at 15:51

## 2017-12-12 RX ADMIN — MIDAZOLAM HYDROCHLORIDE 0.5 MG: 1 INJECTION, SOLUTION INTRAMUSCULAR; INTRAVENOUS at 14:54

## 2017-12-12 RX ADMIN — MORPHINE SULFATE 4 MG: 4 INJECTION, SOLUTION INTRAMUSCULAR; INTRAVENOUS at 16:50

## 2017-12-12 RX ADMIN — ALPRAZOLAM 0.25 MG: 0.25 TABLET ORAL at 19:29

## 2017-12-12 RX ADMIN — HYDROCODONE BITARTRATE AND ACETAMINOPHEN 2 TABLET: 5; 325 TABLET ORAL at 17:14

## 2017-12-12 ASSESSMENT — PAIN DESCRIPTION - PROGRESSION
CLINICAL_PROGRESSION: NOT CHANGED
CLINICAL_PROGRESSION: GRADUALLY IMPROVING

## 2017-12-12 ASSESSMENT — PAIN DESCRIPTION - LOCATION
LOCATION: ARM

## 2017-12-12 ASSESSMENT — PAIN SCALES - GENERAL
PAINLEVEL_OUTOF10: 10
PAINLEVEL_OUTOF10: 0
PAINLEVEL_OUTOF10: 10

## 2017-12-12 ASSESSMENT — PAIN DESCRIPTION - PAIN TYPE
TYPE: SURGICAL PAIN
TYPE: ACUTE PAIN

## 2017-12-12 ASSESSMENT — PAIN DESCRIPTION - ORIENTATION
ORIENTATION: LEFT;UPPER
ORIENTATION: LEFT
ORIENTATION: LEFT;UPPER

## 2017-12-12 ASSESSMENT — PAIN DESCRIPTION - DESCRIPTORS
DESCRIPTORS: THROBBING;PRESSURE
DESCRIPTORS: ACHING;THROBBING

## 2017-12-12 ASSESSMENT — PAIN - FUNCTIONAL ASSESSMENT: PAIN_FUNCTIONAL_ASSESSMENT: 0-10

## 2017-12-12 ASSESSMENT — PAIN DESCRIPTION - FREQUENCY: FREQUENCY: CONTINUOUS

## 2017-12-12 NOTE — H&P
History and Physical Update    Pt Name: Mario Haile  MRN: [id number]  YOB: 1945  Date of evaluation: 12/12/2017      [x] I have reviewed the progress note found in [facility] [place] dated 12/11/17 by Dr. Adri Pleitez which meets the criteria for an Interval History and Physical note and is attached below. [x] I have examined  Mario Haile and there are no changes to the patient or plans for an IR angiogram by Dr. Pattricia Skiff. The patient currently has an elevated blood pressure and denies any cardiac symptoms    Vital signs: BP (!) 174/77   Pulse 69   Temp 98.1 °F (36.7 °C)   Resp 16   SpO2 97%     Allergies:  Vancomycin    Medications:   Current Outpatient Prescriptions on File Prior to Encounter   Medication Sig Dispense Refill    omeprazole (PRILOSEC) 20 MG delayed release capsule TAKE 1 CAPSULE BY MOUTH 2 TIMES DAILY 180 capsule 3    metoprolol (LOPRESSOR) 25 MG tablet Take 0.25 tablets by mouth 2 times daily. 60 tablet 3    atorvastatin (LIPITOR) 20 MG tablet Take 20 mg by mouth daily      docusate (COLACE, DULCOLAX) 100 MG CAPS Take 100 mg by mouth 2 times daily 60 capsule 1    ondansetron (ZOFRAN) 4 MG tablet Take 1 tablet by mouth every 8 hours as needed for Nausea or Vomiting 30 tablet 1    albuterol (PROVENTIL HFA;VENTOLIN HFA) 108 (90 BASE) MCG/ACT inhaler Inhale 2 puffs into the lungs every 6 hours as needed for Wheezing.  fentaNYL (DURAGESIC) 75 MCG/HR Place 1 patch onto the skin every 72 hours.  aspirin 81 MG chewable tablet Take 1 tablet by mouth daily. 30 tablet 11     No current facility-administered medications on file prior to encounter. Prior to Admission medications    Medication Sig Start Date End Date Taking?  Authorizing Provider   ALPRAZolam (XANAX PO) Take 1 tablet by mouth as needed   Yes Historical Provider, MD   omeprazole (PRILOSEC) 20 MG delayed release capsule TAKE 1 CAPSULE BY MOUTH 2 TIMES DAILY 6/5/17  Yes Marilyn Rosas MD   metoprolol ([employer]) 3. pulm infiltrate. Likely infectious   4. Multiple comorbidities in the form of CAD  5. Anemia and CK D              Plan:   1. We reviewed his recent CT which showed no evidence of disease. 2. Advising him to follow up with GI for regular endoscopy. 3. Advised him to follow with vascular for his carotid. 4. I asked him to continue with aspirin. 5. I counseled him on smoking cessation and he is going to try. 6. Return in 6 months with labs.    [address] [place] MD Tanja  Hematologist/Medical Oncologist  Cell: [phone]                Office Visit on 12/11/2017            Detailed Report           Note shared with patient   Progress Notes Info     Author Note Status Last Update User   Joselin Lynn MD Signed Joselin Lynn MD   Last Update Date/Time: 12/11/2017  1:34 PM   Chart Review Routing History     Routing history could not be found for this note. This is because the note has never been routed or because communication record creation was suppressed.

## 2017-12-12 NOTE — POST SEDATION
Sedation Post Procedure Note    Patient Name: Brandon Panda   YOB: 1945  Room/Bed: Room/bed info not found  Medical Record Number: [id number]  Date: 12/12/2017   Time: 4:01 PM         Physicians/Assistants: Duane Ashton MD    Procedure Performed:  Arch aortography  Selective left subclavian angiography  Left subclavian artery angioplasty with 7 mm x 40 mm and 8 mm x 40 mm [place] balloons  Conscious sedation      Post-Sedation Vital Signs:  Vitals:    12/12/17 1559   BP: (!) 168/69   Pulse: 67   Resp: 8   Temp:    SpO2: 99%      Vital signs were reviewed and were stable after the procedure (see flow sheet for vitals)            Post-Sedation Exam: Lungs: clear to auscultation bilaterally and Cardiovascular: regular rate and rhythm           Complications: none    Electronically signed by Duane Ashton MD on 12/12/2017 at 4:01 PM

## 2017-12-12 NOTE — SEDATION DOCUMENTATION
Patient transferred to pacu  Medicated for c/o left arm pain post procedure  Hematoma marked, Dr Mis Lopez aware and visualized  Radial pulse palpable, fingers warm to touch  Patient denies numbness/tingling to left extremity  Armboard placed

## 2017-12-12 NOTE — BRIEF OP NOTE
Brief Postoperative Note  ______________________________________________________________    Patient: Eleanor Mcdaniel  YOB: 1945  MRN: [id number]  Date of Procedure: 12/12/2017    Pre-Op Diagnosis: Left subclavian stenosis    Post-Op Diagnosis: Same       Procedure: Arch aortography  Selective left subclavian angiography  Left subclavian artery angioplasty with 7 mm x 40 mm and 8 mm x 40 mm [place] balloons  Conscious sedation    Anesthesia: Local / IV sedation    Surgeon: Juan Carlos Huitron MD    Staff:  * No surgical staff found *     Estimated Blood Loss: Minimal    Complications: None    Specimens:   * No specimens in log *    Implants:  * No implants in log *      Drains:      Findings: 90% left subclavian stenosis just proximal to the vertebral artery at the thoracic outlet    Juan Carlos Huitron MD  Date: 12/12/2017  Time: 3:57 PM

## 2017-12-12 NOTE — PRE SEDATION
mouth 2 times daily. 4/15/14  Yes Wilda Damon MD   atorvastatin (LIPITOR) 20 MG tablet Take 20 mg by mouth daily    Historical Provider, MD   docusate (COLACE, DULCOLAX) 100 MG CAPS Take 100 mg by mouth 2 times daily 11/21/16   China Agrawal MD   ondansetron (ZOFRAN) 4 MG tablet Take 1 tablet by mouth every 8 hours as needed for Nausea or Vomiting 5/3/16   China Agrawal MD   albuterol (PROVENTIL HFA;VENTOLIN HFA) 108 (90 BASE) MCG/ACT inhaler Inhale 2 puffs into the lungs every 6 hours as needed for Wheezing. Historical Provider, MD   fentaNYL (DURAGESIC) 75 MCG/HR Place 1 patch onto the skin every 72 hours. Historical Provider, MD   aspirin 81 MG chewable tablet Take 1 tablet by mouth daily. 3/11/14   Ilene Sheppard MD     Coumadin Use Last 7 Days:  no  Antiplatelet drug therapy use last 7 days: no  Other anticoagulant use last 7 days: no  Additional Medication Information:        Pre-Sedation Documentation and Exam:   I have personally completed a history, physical exam & review of systems for this patient (see notes).     Mallampati Airway Assessment:  Mallampati Class II - (soft palate, fauces & uvula are visible)    Prior History of Anesthesia Complications:   none    ASA Classification:  Class 2 - A normal healthy patient with mild systemic disease    Sedation/ Anesthesia Plan:   intravenous sedation    Medications Planned:   midazolam (Versed) intravenously and fentanyl intravenously    Patient is an appropriate candidate for plan of sedation: yes    Electronically signed by Billy Serrano MD on 12/12/2017 at 3:54 PM

## 2017-12-13 VITALS
DIASTOLIC BLOOD PRESSURE: 61 MMHG | WEIGHT: 152 LBS | BODY MASS INDEX: 24.43 KG/M2 | TEMPERATURE: 98.8 F | HEIGHT: 66 IN | SYSTOLIC BLOOD PRESSURE: 142 MMHG | RESPIRATION RATE: 13 BRPM | HEART RATE: 63 BPM | OXYGEN SATURATION: 95 %

## 2017-12-13 PROCEDURE — 2580000003 HC RX 258: Performed by: SURGERY

## 2017-12-13 PROCEDURE — 6360000002 HC RX W HCPCS: Performed by: SURGERY

## 2017-12-13 PROCEDURE — 6370000000 HC RX 637 (ALT 250 FOR IP): Performed by: SURGERY

## 2017-12-13 PROCEDURE — G0378 HOSPITAL OBSERVATION PER HR: HCPCS

## 2017-12-13 PROCEDURE — 99226 PR SBSQ OBSERVATION CARE/DAY 35 MINUTES: CPT | Performed by: SURGERY

## 2017-12-13 RX ORDER — CLOPIDOGREL BISULFATE 75 MG/1
75 TABLET ORAL DAILY
Qty: 30 TABLET | Refills: 6 | Status: SHIPPED | OUTPATIENT
Start: 2017-12-18 | End: 2021-01-11

## 2017-12-13 RX ORDER — MULTIVIT WITH MINERALS/LUTEIN
1000 TABLET ORAL DAILY
COMMUNITY

## 2017-12-13 RX ORDER — ALPRAZOLAM 0.25 MG/1
0.25 TABLET ORAL ONCE
Status: COMPLETED | OUTPATIENT
Start: 2017-12-13 | End: 2017-12-13

## 2017-12-13 RX ORDER — HYDROCODONE BITARTRATE AND ACETAMINOPHEN 10; 325 MG/1; MG/1
1 TABLET ORAL EVERY 4 HOURS PRN
COMMUNITY

## 2017-12-13 RX ADMIN — HYDROMORPHONE HYDROCHLORIDE 1 MG: 2 INJECTION, SOLUTION INTRAMUSCULAR; INTRAVENOUS; SUBCUTANEOUS at 05:02

## 2017-12-13 RX ADMIN — ONDANSETRON 4 MG: 2 INJECTION INTRAMUSCULAR; INTRAVENOUS at 05:01

## 2017-12-13 RX ADMIN — HYDROMORPHONE HYDROCHLORIDE 1 MG: 2 INJECTION, SOLUTION INTRAMUSCULAR; INTRAVENOUS; SUBCUTANEOUS at 08:05

## 2017-12-13 RX ADMIN — HYDROMORPHONE HYDROCHLORIDE 1 MG: 2 INJECTION, SOLUTION INTRAMUSCULAR; INTRAVENOUS; SUBCUTANEOUS at 13:17

## 2017-12-13 RX ADMIN — HYDROMORPHONE HYDROCHLORIDE 1 MG: 2 INJECTION, SOLUTION INTRAMUSCULAR; INTRAVENOUS; SUBCUTANEOUS at 02:57

## 2017-12-13 RX ADMIN — MORPHINE SULFATE 4 MG: 4 INJECTION, SOLUTION INTRAMUSCULAR; INTRAVENOUS at 15:02

## 2017-12-13 RX ADMIN — HYDROMORPHONE HYDROCHLORIDE 1 MG: 2 INJECTION, SOLUTION INTRAMUSCULAR; INTRAVENOUS; SUBCUTANEOUS at 00:11

## 2017-12-13 RX ADMIN — SODIUM CHLORIDE: 4.5 INJECTION, SOLUTION INTRAVENOUS at 10:46

## 2017-12-13 RX ADMIN — ALPRAZOLAM 0.25 MG: 0.25 TABLET ORAL at 15:53

## 2017-12-13 RX ADMIN — HYDROMORPHONE HYDROCHLORIDE 1 MG: 2 INJECTION, SOLUTION INTRAMUSCULAR; INTRAVENOUS; SUBCUTANEOUS at 10:42

## 2017-12-13 ASSESSMENT — PAIN DESCRIPTION - LOCATION
LOCATION: ARM
LOCATION: ARM

## 2017-12-13 ASSESSMENT — PAIN DESCRIPTION - PROGRESSION
CLINICAL_PROGRESSION: GRADUALLY IMPROVING

## 2017-12-13 ASSESSMENT — PAIN DESCRIPTION - FREQUENCY
FREQUENCY: CONTINUOUS
FREQUENCY: CONTINUOUS

## 2017-12-13 ASSESSMENT — PAIN SCALES - GENERAL
PAINLEVEL_OUTOF10: 6
PAINLEVEL_OUTOF10: 10
PAINLEVEL_OUTOF10: 10
PAINLEVEL_OUTOF10: 9
PAINLEVEL_OUTOF10: 10
PAINLEVEL_OUTOF10: 10
PAINLEVEL_OUTOF10: 4
PAINLEVEL_OUTOF10: 6
PAINLEVEL_OUTOF10: 10
PAINLEVEL_OUTOF10: 10

## 2017-12-13 ASSESSMENT — PAIN DESCRIPTION - DESCRIPTORS
DESCRIPTORS: ACHING;SHARP
DESCRIPTORS: ACHING;SHARP

## 2017-12-13 ASSESSMENT — PAIN DESCRIPTION - PAIN TYPE
TYPE: ACUTE PAIN
TYPE: ACUTE PAIN

## 2017-12-13 ASSESSMENT — PAIN DESCRIPTION - ORIENTATION
ORIENTATION: LEFT
ORIENTATION: LEFT

## 2017-12-13 ASSESSMENT — PAIN DESCRIPTION - ONSET
ONSET: ON-GOING
ONSET: ON-GOING

## 2017-12-13 NOTE — OP NOTE
anesthetized  with 1% Xylocaine. Under ultrasound guidance using a micropuncture  technique, the left brachial artery was isolated and a 5-Guamanian sheath was  advanced. A 5-Guamanian SOS Omni Flush catheter was then advanced to the  aortic root and using 50% diluted Visipaque 320 contrast and digital  imaging, arch aortography was performed in the [place] projection. This  demonstrated a relatively normal aortic arch with widely patent  supra-aortic trunks proximally. There was a high-grade 90% stenosis noted  in the left subclavian artery at the thoracic outlet, just proximal to the  left vertebral artery. The left carotid bifurcation appeared essentially  normal with some mild plaque being noted at the right carotid bifurcation. At this time, the 5-Guamanian sheath was exchanged for a 6-Guamanian [place]  Destination sheath. Retrograde contrast angiography was then performed by  a sheath injection in the AP view, re-demonstrating the high-grade 90%  stenosis. The patient was intravenously heparinized with 5000 units of  aqueous heparin. Next, a 7 mm x 40 mm [place] balloon was advanced into  the stenotic area and sequentially inflated. Angiography demonstrated  improvement, however, residual stenosis remain. An 8 mm x 40 mm [place]  balloon was then advanced into this region and sequentially inflated  over-sizing the balloon to approximately 8.4 mm. Completion angiography  demonstrated essentially complete resolution of the previous stenosis with  excellent flow into the vertebral artery and subclavian artery. A 50 mg of  protamine was given intravenously in order to reverse the remaining heparin  and the brachial sheath was removed and manual pressure was applied  followed by a sterile dressing. The patient tolerated the procedure well,  was transferred to the recovery room in satisfactory condition.     IMPRESSION:  A 90% focal left subclavian artery stenosis at the thoracic  outlet, just proximal to the

## 2017-12-13 NOTE — FLOWSHEET NOTE
Pt received to room 1103 per bed from PACU, connected to monitors, pt assessment completed, pt oriented to room and surrondings, instructed on bedrest, also instructed to call for assist if needed, call light within reach, post -op orders received, care plans initiated.

## 2017-12-13 NOTE — CARE COORDINATION
Case Management Initial Discharge Plan  Levon Mckeon Yanick,         Readmission Risk              Readmission Risk:        10.75       Age 72 or Greater:  1    Admitted from SNF or Requires Paid or Family Care:  0    Currently has CHF,COPD,ARF,CRI,or is on dialysis:  0    Takes more than 5 Prescription Medications:  4    Takes Digoxin,Insulin,Anticoagulants,Narcotics or ASA/Plavix:  2    [address] in Past 12 Months:  0    On Disability:  0    Patient Considers own Health:  3.75            Met with:patient to discuss discharge plans. Information verified: address, contacts, phone number, , insurance Yes  PCP: Veronica Friend  Date of last visit:  unknown    Insurance Provider:  Medicare and GPM    Discharge Planning  Current Residence:   house  Living Arrangements:  [place] has 1 stories/0 stairs to climb  Support Systems:  Spouse/Significant Other  Current Services PTA:   none Agency:  none  Patient able to perform ADL's:Independent  DME in home:   BSC, Shower chair, walker, cane  DME used to aid ambulation prior to admission:    none  DME used during admission:   none    Potential Assistance Needed:  N/A    Pharmacy:  [facility] [place]   Potential Assistance Purchasing Medications:  No  Does patient want to participate in local refill/ meds to beds program?  N/A    Patient agreeable to home care: No  [place] of choice provided:  n/a      Type of Home Care Services:  None  Patient expects to be discharged to:  own home    Prior SNF/Rehab Placement and Facility:  none  Agreeable to SNF/Rehab: No  [place] of choice provided: n/a   Evaluation: no    Expected Discharge date:  17  Follow Up Appointment: Best Day/ Time:  (no difference)    Transportation provider:  self  Transportation arrangements needed for discharge: No    Discharge Plan:  Met with pt at bedside. Pt is  from his spouse and lives alone in one level home.   Pt was independent at home prior to surgery. Pt admitted for feft subclavian artery angioplasty and developed hematoma. Pt entire left arm is wrapped in ace wrap. Pt is not able to bend his arm and is having great degree of pain. Pt is very anxious and tearful. States he has asked for xanax so he can relax and try to sleep. States no one is listening to him. Spoke with RN and is awaiting Dr. Jenniffer Galo to return to see pt. Pt may be discharged home today. Pt states his wife will be able to assist him. No dc needs anticipated.          Electronically signed by Meredith Christian RN on 12/13/17 at 12:20 PM

## 2017-12-13 NOTE — PROGRESS NOTES
Vascular Surgery   Progress Note    12/13/2017 9:14 AM  Subjective:   Admit Date: 12/12/2017  PCP: Edson Avendano  Interval History: Feeling much better today. Left arm with Ace wrap and less swelling. Diet: DIET GENERAL;    Medications:   Scheduled Meds:   aspirin  81 mg Oral Daily    fentaNYL  1 patch Transdermal Q72H    metoprolol tartrate  6.25 mg Oral BID    atorvastatin  20 mg Oral Daily    docusate sodium  100 mg Oral BID    pantoprazole  40 mg Oral QAM AC     Continuous Infusions:   sodium chloride 75 mL/hr at 12/12/17 2100         Labs:   CBC:   Recent Labs      12/12/17   1306  12/12/17   1423   WBC   --   8.9   HGB   --   12.1*   PLT  326  312     BMP:    Recent Labs      12/12/17   1423   BUN  16   CREATININE  1.15     Hepatic: No results for input(s): AST, ALT, ALB, BILITOT, ALKPHOS in the last 72 hours. Troponin: Invalid input(s): TROPONIN  BNP: No results for input(s): BNP in the last 72 hours. Lipids: No results for input(s): CHOL, HDL in the last 72 hours. Invalid input(s): LDLCALCU  INR:   Recent Labs      12/12/17   1306   INR  1.1       Objective:   Vitals: BP (!) 141/55   Pulse 60   Temp 97.9 °F (36.6 °C) (Infrared)   Resp 15   Ht 5' 5.5\" (1.664 m)   Wt 152 lb (68.9 kg)   SpO2 96%   BMI 24.91 kg/m²   General appearance: alert, cooperative and no distress  Mental Status: oriented to person, place and time with normal affect  Neck: good carotid pulses, no JVD  Lungs: clear to auscultation bilaterally, normal effort  Heart: regular rate and rhythm, no murmur,  Abdomen: soft, non-tender, non-distended, bowel sounds present all four quadrants, no masses, hepatomegaly, splenomegaly or aortic enlargement  Extremities: Bruising along the left upper arm with less swelling. Excellent radial pulse  Skin: no gross lesions, rashes, or induration    Assessment:   1. Doing well status post left subclavian angioplasty  2.  Left upper arm hematoma    Patient Active Problem List:

## 2017-12-15 ENCOUNTER — TELEPHONE (OUTPATIENT)
Dept: GASTROENTEROLOGY | Age: 72
End: 2017-12-15

## 2017-12-15 RX ORDER — ONDANSETRON 4 MG/1
4 TABLET, FILM COATED ORAL EVERY 8 HOURS PRN
Qty: 30 TABLET | Refills: 1 | Status: SHIPPED | OUTPATIENT
Start: 2017-12-15 | End: 2018-06-06 | Stop reason: SDUPTHER

## 2017-12-15 NOTE — TELEPHONE ENCOUNTER
Pt's wife called, left vm that she was told by Dr Sujit Mosqueda at pt's last office apt (October) that pt could have an rx for Zofran. I did call her back and left vm. Explained that Dr Sujit Mosqueda does not have that noted in chart from 2 months ago and Dr Sujit Mosqueda is not in the office today and that d/t pt has recently been seen by oncology and in hospital this month, she should follow up with their office for that prescription.

## 2018-04-05 ENCOUNTER — APPOINTMENT (OUTPATIENT)
Dept: MRI IMAGING | Age: 73
End: 2018-04-05
Payer: MEDICARE

## 2018-04-05 ENCOUNTER — APPOINTMENT (OUTPATIENT)
Dept: CT IMAGING | Age: 73
End: 2018-04-05
Payer: MEDICARE

## 2018-04-05 ENCOUNTER — HOSPITAL ENCOUNTER (OUTPATIENT)
Age: 73
Setting detail: OBSERVATION
LOS: 1 days | Discharge: HOME | End: 2018-04-06
Attending: EMERGENCY MEDICINE | Admitting: INTERNAL MEDICINE
Payer: MEDICARE

## 2018-04-05 ENCOUNTER — APPOINTMENT (OUTPATIENT)
Dept: GENERAL RADIOLOGY | Age: 73
End: 2018-04-05
Payer: MEDICARE

## 2018-04-05 DIAGNOSIS — R07.9 CHEST PAIN, UNSPECIFIED TYPE: Primary | ICD-10-CM

## 2018-04-05 DIAGNOSIS — G89.29 CHRONIC NONINTRACTABLE HEADACHE, UNSPECIFIED HEADACHE TYPE: ICD-10-CM

## 2018-04-05 DIAGNOSIS — R51.9 CHRONIC NONINTRACTABLE HEADACHE, UNSPECIFIED HEADACHE TYPE: ICD-10-CM

## 2018-04-05 PROBLEM — M89.8X8 SKULL MASS: Status: ACTIVE | Noted: 2018-04-05

## 2018-04-05 LAB
ABSOLUTE EOS #: 0.15 K/UL (ref 0–0.44)
ABSOLUTE IMMATURE GRANULOCYTE: <0.03 K/UL (ref 0–0.3)
ABSOLUTE LYMPH #: 2.89 K/UL (ref 1.1–3.7)
ABSOLUTE MONO #: 0.91 K/UL (ref 0.1–1.2)
ALBUMIN SERPL-MCNC: 4.3 G/DL (ref 3.5–5.2)
ALBUMIN/GLOBULIN RATIO: 1.5 (ref 1–2.5)
ALP BLD-CCNC: 68 U/L (ref 40–129)
ALT SERPL-CCNC: 10 U/L (ref 5–41)
ANION GAP SERPL CALCULATED.3IONS-SCNC: 15 MMOL/L (ref 9–17)
AST SERPL-CCNC: 14 U/L
BASOPHILS # BLD: 1 % (ref 0–2)
BASOPHILS ABSOLUTE: 0.12 K/UL (ref 0–0.2)
BILIRUB SERPL-MCNC: 0.39 MG/DL (ref 0.3–1.2)
BNP INTERPRETATION: ABNORMAL
BUN BLDV-MCNC: 19 MG/DL (ref 8–23)
BUN/CREAT BLD: ABNORMAL (ref 9–20)
CALCIUM SERPL-MCNC: 9.6 MG/DL (ref 8.6–10.4)
CHLORIDE BLD-SCNC: 100 MMOL/L (ref 98–107)
CO2: 24 MMOL/L (ref 20–31)
CREAT SERPL-MCNC: 1.31 MG/DL (ref 0.7–1.2)
D-DIMER QUANTITATIVE: 0.72 MG/L FEU
DIFFERENTIAL TYPE: ABNORMAL
EOSINOPHILS RELATIVE PERCENT: 1 % (ref 1–4)
GFR AFRICAN AMERICAN: >60 ML/MIN
GFR NON-AFRICAN AMERICAN: 54 ML/MIN
GFR SERPL CREATININE-BSD FRML MDRD: ABNORMAL ML/MIN/{1.73_M2}
GFR SERPL CREATININE-BSD FRML MDRD: ABNORMAL ML/MIN/{1.73_M2}
GLUCOSE BLD-MCNC: 130 MG/DL (ref 70–99)
HCT VFR BLD CALC: 43.4 % (ref 40.7–50.3)
HEMOGLOBIN: 13.9 G/DL (ref 13–17)
IMMATURE GRANULOCYTES: 0 %
LYMPHOCYTES # BLD: 27 % (ref 24–43)
MCH RBC QN AUTO: 28.2 PG (ref 25.2–33.5)
MCHC RBC AUTO-ENTMCNC: 32 G/DL (ref 28.4–34.8)
MCV RBC AUTO: 88 FL (ref 82.6–102.9)
MONOCYTES # BLD: 9 % (ref 3–12)
NRBC AUTOMATED: 0 PER 100 WBC
PDW BLD-RTO: 15.8 % (ref 11.8–14.4)
PLATELET # BLD: 337 K/UL (ref 138–453)
PLATELET ESTIMATE: ABNORMAL
PMV BLD AUTO: 9.7 FL (ref 8.1–13.5)
POC TROPONIN I: 0.01 NG/ML (ref 0–0.1)
POC TROPONIN I: 0.02 NG/ML (ref 0–0.1)
POC TROPONIN INTERP: NORMAL
POC TROPONIN INTERP: NORMAL
POTASSIUM SERPL-SCNC: 4.4 MMOL/L (ref 3.7–5.3)
PRO-BNP: 1348 PG/ML
RBC # BLD: 4.93 M/UL (ref 4.21–5.77)
RBC # BLD: ABNORMAL 10*6/UL
SEG NEUTROPHILS: 62 % (ref 36–65)
SEGMENTED NEUTROPHILS ABSOLUTE COUNT: 6.48 K/UL (ref 1.5–8.1)
SODIUM BLD-SCNC: 139 MMOL/L (ref 135–144)
TOTAL PROTEIN: 7.1 G/DL (ref 6.4–8.3)
TROPONIN INTERP: NORMAL
TROPONIN T: <0.03 NG/ML
WBC # BLD: 10.6 K/UL (ref 3.5–11.3)
WBC # BLD: ABNORMAL 10*3/UL

## 2018-04-05 PROCEDURE — 6370000000 HC RX 637 (ALT 250 FOR IP): Performed by: STUDENT IN AN ORGANIZED HEALTH CARE EDUCATION/TRAINING PROGRAM

## 2018-04-05 PROCEDURE — 1200000000 HC SEMI PRIVATE

## 2018-04-05 PROCEDURE — 70450 CT HEAD/BRAIN W/O DYE: CPT

## 2018-04-05 PROCEDURE — 93005 ELECTROCARDIOGRAM TRACING: CPT

## 2018-04-05 PROCEDURE — 96374 THER/PROPH/DIAG INJ IV PUSH: CPT

## 2018-04-05 PROCEDURE — 99285 EMERGENCY DEPT VISIT HI MDM: CPT

## 2018-04-05 PROCEDURE — 80053 COMPREHEN METABOLIC PANEL: CPT

## 2018-04-05 PROCEDURE — 71046 X-RAY EXAM CHEST 2 VIEWS: CPT

## 2018-04-05 PROCEDURE — 85025 COMPLETE CBC W/AUTO DIFF WBC: CPT

## 2018-04-05 PROCEDURE — 70553 MRI BRAIN STEM W/O & W/DYE: CPT

## 2018-04-05 PROCEDURE — 96375 TX/PRO/DX INJ NEW DRUG ADDON: CPT

## 2018-04-05 PROCEDURE — 2580000003 HC RX 258: Performed by: STUDENT IN AN ORGANIZED HEALTH CARE EDUCATION/TRAINING PROGRAM

## 2018-04-05 PROCEDURE — 85379 FIBRIN DEGRADATION QUANT: CPT

## 2018-04-05 PROCEDURE — 83880 ASSAY OF NATRIURETIC PEPTIDE: CPT

## 2018-04-05 PROCEDURE — 84484 ASSAY OF TROPONIN QUANT: CPT

## 2018-04-05 PROCEDURE — 96372 THER/PROPH/DIAG INJ SC/IM: CPT

## 2018-04-05 PROCEDURE — 6360000002 HC RX W HCPCS: Performed by: STUDENT IN AN ORGANIZED HEALTH CARE EDUCATION/TRAINING PROGRAM

## 2018-04-05 PROCEDURE — 36415 COLL VENOUS BLD VENIPUNCTURE: CPT

## 2018-04-05 PROCEDURE — 6360000004 HC RX CONTRAST MEDICATION: Performed by: STUDENT IN AN ORGANIZED HEALTH CARE EDUCATION/TRAINING PROGRAM

## 2018-04-05 PROCEDURE — G0378 HOSPITAL OBSERVATION PER HR: HCPCS

## 2018-04-05 PROCEDURE — 71260 CT THORAX DX C+: CPT

## 2018-04-05 RX ORDER — ASCORBIC ACID 500 MG
1000 TABLET ORAL DAILY
Status: DISCONTINUED | OUTPATIENT
Start: 2018-04-06 | End: 2018-04-06 | Stop reason: HOSPADM

## 2018-04-05 RX ORDER — POTASSIUM CHLORIDE 7.45 MG/ML
10 INJECTION INTRAVENOUS PRN
Status: DISCONTINUED | OUTPATIENT
Start: 2018-04-05 | End: 2018-04-06 | Stop reason: HOSPADM

## 2018-04-05 RX ORDER — CLOPIDOGREL BISULFATE 75 MG/1
75 TABLET ORAL DAILY
Status: DISCONTINUED | OUTPATIENT
Start: 2018-04-06 | End: 2018-04-06

## 2018-04-05 RX ORDER — DOCUSATE SODIUM 100 MG/1
100 CAPSULE, LIQUID FILLED ORAL 2 TIMES DAILY
Status: DISCONTINUED | OUTPATIENT
Start: 2018-04-05 | End: 2018-04-06 | Stop reason: HOSPADM

## 2018-04-05 RX ORDER — ACETAMINOPHEN 325 MG/1
650 TABLET ORAL EVERY 4 HOURS PRN
Status: DISCONTINUED | OUTPATIENT
Start: 2018-04-05 | End: 2018-04-06 | Stop reason: HOSPADM

## 2018-04-05 RX ORDER — SODIUM CHLORIDE 0.9 % (FLUSH) 0.9 %
10 SYRINGE (ML) INJECTION PRN
Status: DISCONTINUED | OUTPATIENT
Start: 2018-04-05 | End: 2018-04-06 | Stop reason: HOSPADM

## 2018-04-05 RX ORDER — FENTANYL 50 UG/H
1 PATCH TRANSDERMAL EVERY OTHER DAY
Status: DISCONTINUED | OUTPATIENT
Start: 2018-04-06 | End: 2018-04-06 | Stop reason: HOSPADM

## 2018-04-05 RX ORDER — ASPIRIN 81 MG/1
81 TABLET, CHEWABLE ORAL DAILY
Status: DISCONTINUED | OUTPATIENT
Start: 2018-04-06 | End: 2018-04-06 | Stop reason: HOSPADM

## 2018-04-05 RX ORDER — POTASSIUM CHLORIDE 20MEQ/15ML
40 LIQUID (ML) ORAL PRN
Status: DISCONTINUED | OUTPATIENT
Start: 2018-04-05 | End: 2018-04-06 | Stop reason: HOSPADM

## 2018-04-05 RX ORDER — ALPRAZOLAM 0.25 MG/1
0.5 TABLET ORAL ONCE
Status: COMPLETED | OUTPATIENT
Start: 2018-04-05 | End: 2018-04-05

## 2018-04-05 RX ORDER — OXYCODONE HYDROCHLORIDE AND ACETAMINOPHEN 5; 325 MG/1; MG/1
2 TABLET ORAL ONCE
Status: COMPLETED | OUTPATIENT
Start: 2018-04-05 | End: 2018-04-05

## 2018-04-05 RX ORDER — HYDROCODONE BITARTRATE AND ACETAMINOPHEN 5; 325 MG/1; MG/1
1 TABLET ORAL EVERY 4 HOURS PRN
Status: DISCONTINUED | OUTPATIENT
Start: 2018-04-05 | End: 2018-04-06

## 2018-04-05 RX ORDER — LABETALOL HYDROCHLORIDE 5 MG/ML
10 INJECTION, SOLUTION INTRAVENOUS
Status: DISCONTINUED | OUTPATIENT
Start: 2018-04-05 | End: 2018-04-06 | Stop reason: HOSPADM

## 2018-04-05 RX ORDER — ATORVASTATIN CALCIUM 20 MG/1
20 TABLET, FILM COATED ORAL DAILY
Status: DISCONTINUED | OUTPATIENT
Start: 2018-04-06 | End: 2018-04-06 | Stop reason: HOSPADM

## 2018-04-05 RX ORDER — PANTOPRAZOLE SODIUM 40 MG/1
40 TABLET, DELAYED RELEASE ORAL
Status: DISCONTINUED | OUTPATIENT
Start: 2018-04-06 | End: 2018-04-06 | Stop reason: HOSPADM

## 2018-04-05 RX ORDER — POTASSIUM CHLORIDE 20 MEQ/1
40 TABLET, EXTENDED RELEASE ORAL PRN
Status: DISCONTINUED | OUTPATIENT
Start: 2018-04-05 | End: 2018-04-06 | Stop reason: HOSPADM

## 2018-04-05 RX ORDER — ONDANSETRON 4 MG/1
4 TABLET, FILM COATED ORAL EVERY 8 HOURS PRN
Status: DISCONTINUED | OUTPATIENT
Start: 2018-04-05 | End: 2018-04-06 | Stop reason: HOSPADM

## 2018-04-05 RX ORDER — ALBUTEROL SULFATE 90 UG/1
2 AEROSOL, METERED RESPIRATORY (INHALATION) EVERY 6 HOURS PRN
Status: DISCONTINUED | OUTPATIENT
Start: 2018-04-05 | End: 2018-04-06 | Stop reason: HOSPADM

## 2018-04-05 RX ORDER — LISINOPRIL 2.5 MG/1
2.5 TABLET ORAL DAILY
Status: DISCONTINUED | OUTPATIENT
Start: 2018-04-06 | End: 2018-04-06 | Stop reason: HOSPADM

## 2018-04-05 RX ORDER — SODIUM CHLORIDE 0.9 % (FLUSH) 0.9 %
10 SYRINGE (ML) INJECTION EVERY 12 HOURS SCHEDULED
Status: DISCONTINUED | OUTPATIENT
Start: 2018-04-05 | End: 2018-04-06 | Stop reason: HOSPADM

## 2018-04-05 RX ORDER — ALPRAZOLAM 0.5 MG/1
0.5 TABLET ORAL 2 TIMES DAILY PRN
Status: DISCONTINUED | OUTPATIENT
Start: 2018-04-05 | End: 2018-04-06 | Stop reason: HOSPADM

## 2018-04-05 RX ORDER — MORPHINE SULFATE 4 MG/ML
4 INJECTION, SOLUTION INTRAMUSCULAR; INTRAVENOUS ONCE
Status: COMPLETED | OUTPATIENT
Start: 2018-04-05 | End: 2018-04-05

## 2018-04-05 RX ORDER — ONDANSETRON 2 MG/ML
4 INJECTION INTRAMUSCULAR; INTRAVENOUS ONCE
Status: COMPLETED | OUTPATIENT
Start: 2018-04-05 | End: 2018-04-05

## 2018-04-05 RX ORDER — ONDANSETRON 2 MG/ML
4 INJECTION INTRAMUSCULAR; INTRAVENOUS EVERY 6 HOURS PRN
Status: DISCONTINUED | OUTPATIENT
Start: 2018-04-05 | End: 2018-04-06 | Stop reason: HOSPADM

## 2018-04-05 RX ORDER — SODIUM CHLORIDE 9 MG/ML
INJECTION, SOLUTION INTRAVENOUS CONTINUOUS
Status: DISCONTINUED | OUTPATIENT
Start: 2018-04-05 | End: 2018-04-06 | Stop reason: HOSPADM

## 2018-04-05 RX ADMIN — ALPRAZOLAM 0.5 MG: 0.25 TABLET ORAL at 13:33

## 2018-04-05 RX ADMIN — IOPAMIDOL 75 ML: 755 INJECTION, SOLUTION INTRAVENOUS at 14:16

## 2018-04-05 RX ADMIN — ALPRAZOLAM 0.5 MG: 0.25 TABLET ORAL at 20:07

## 2018-04-05 RX ADMIN — OXYCODONE HYDROCHLORIDE AND ACETAMINOPHEN 2 TABLET: 5; 325 TABLET ORAL at 20:18

## 2018-04-05 RX ADMIN — ONDANSETRON 4 MG: 2 INJECTION INTRAMUSCULAR; INTRAVENOUS at 12:55

## 2018-04-05 RX ADMIN — MORPHINE SULFATE 4 MG: 4 INJECTION INTRAVENOUS at 17:02

## 2018-04-05 RX ADMIN — SODIUM CHLORIDE: 9 INJECTION, SOLUTION INTRAVENOUS at 21:44

## 2018-04-05 ASSESSMENT — PAIN SCALES - GENERAL
PAINLEVEL_OUTOF10: 4
PAINLEVEL_OUTOF10: 8
PAINLEVEL_OUTOF10: 8

## 2018-04-05 ASSESSMENT — HEART SCORE: ECG: 0

## 2018-04-05 ASSESSMENT — ENCOUNTER SYMPTOMS
COUGH: 0
DOUBLE VISION: 0
VOMITING: 0
HEMOPTYSIS: 0
ORTHOPNEA: 0
SHORTNESS OF BREATH: 1
ABDOMINAL PAIN: 0
SHORTNESS OF BREATH: 0
BLOOD IN STOOL: 0
BACK PAIN: 1
BLURRED VISION: 0
CHEST TIGHTNESS: 1
PHOTOPHOBIA: 0
NAUSEA: 1
DIARRHEA: 0
SORE THROAT: 0

## 2018-04-05 ASSESSMENT — PAIN DESCRIPTION - LOCATION: LOCATION: BACK

## 2018-04-05 ASSESSMENT — PAIN DESCRIPTION - PAIN TYPE: TYPE: ACUTE PAIN

## 2018-04-06 VITALS
OXYGEN SATURATION: 95 % | BODY MASS INDEX: 25.33 KG/M2 | TEMPERATURE: 97.7 F | HEIGHT: 65 IN | DIASTOLIC BLOOD PRESSURE: 67 MMHG | HEART RATE: 54 BPM | SYSTOLIC BLOOD PRESSURE: 125 MMHG | WEIGHT: 152 LBS | RESPIRATION RATE: 12 BRPM

## 2018-04-06 LAB
ABSOLUTE EOS #: 0.42 K/UL (ref 0–0.44)
ABSOLUTE IMMATURE GRANULOCYTE: 0.03 K/UL (ref 0–0.3)
ABSOLUTE LYMPH #: 2.78 K/UL (ref 1.1–3.7)
ABSOLUTE MONO #: 0.97 K/UL (ref 0.1–1.2)
ANION GAP SERPL CALCULATED.3IONS-SCNC: 13 MMOL/L (ref 9–17)
BASOPHILS # BLD: 1 % (ref 0–2)
BASOPHILS ABSOLUTE: 0.12 K/UL (ref 0–0.2)
BUN BLDV-MCNC: 19 MG/DL (ref 8–23)
BUN/CREAT BLD: ABNORMAL (ref 9–20)
CALCIUM SERPL-MCNC: 9.1 MG/DL (ref 8.6–10.4)
CHLORIDE BLD-SCNC: 102 MMOL/L (ref 98–107)
CO2: 22 MMOL/L (ref 20–31)
CREAT SERPL-MCNC: 1.27 MG/DL (ref 0.7–1.2)
DIFFERENTIAL TYPE: ABNORMAL
EKG ATRIAL RATE: 84 BPM
EKG P AXIS: 46 DEGREES
EKG P-R INTERVAL: 136 MS
EKG Q-T INTERVAL: 362 MS
EKG QRS DURATION: 92 MS
EKG QTC CALCULATION (BAZETT): 427 MS
EKG R AXIS: 51 DEGREES
EKG T AXIS: 43 DEGREES
EKG VENTRICULAR RATE: 84 BPM
EOSINOPHILS RELATIVE PERCENT: 5 % (ref 1–4)
GFR AFRICAN AMERICAN: >60 ML/MIN
GFR NON-AFRICAN AMERICAN: 56 ML/MIN
GFR SERPL CREATININE-BSD FRML MDRD: ABNORMAL ML/MIN/{1.73_M2}
GFR SERPL CREATININE-BSD FRML MDRD: ABNORMAL ML/MIN/{1.73_M2}
GLUCOSE BLD-MCNC: 93 MG/DL (ref 70–99)
HCT VFR BLD CALC: 42.4 % (ref 40.7–50.3)
HEMOGLOBIN: 13.2 G/DL (ref 13–17)
IMMATURE GRANULOCYTES: 0 %
LYMPHOCYTES # BLD: 32 % (ref 24–43)
MCH RBC QN AUTO: 27.3 PG (ref 25.2–33.5)
MCHC RBC AUTO-ENTMCNC: 31.1 G/DL (ref 28.4–34.8)
MCV RBC AUTO: 87.8 FL (ref 82.6–102.9)
MONOCYTES # BLD: 11 % (ref 3–12)
NRBC AUTOMATED: 0 PER 100 WBC
PDW BLD-RTO: 16.1 % (ref 11.8–14.4)
PLATELET # BLD: 320 K/UL (ref 138–453)
PLATELET ESTIMATE: ABNORMAL
PMV BLD AUTO: 9.6 FL (ref 8.1–13.5)
POTASSIUM SERPL-SCNC: 4.3 MMOL/L (ref 3.7–5.3)
RBC # BLD: 4.83 M/UL (ref 4.21–5.77)
RBC # BLD: ABNORMAL 10*6/UL
SEG NEUTROPHILS: 51 % (ref 36–65)
SEGMENTED NEUTROPHILS ABSOLUTE COUNT: 4.36 K/UL (ref 1.5–8.1)
SODIUM BLD-SCNC: 137 MMOL/L (ref 135–144)
TROPONIN INTERP: NORMAL
TROPONIN INTERP: NORMAL
TROPONIN T: <0.03 NG/ML
TROPONIN T: <0.03 NG/ML
WBC # BLD: 8.7 K/UL (ref 3.5–11.3)
WBC # BLD: ABNORMAL 10*3/UL

## 2018-04-06 PROCEDURE — 80048 BASIC METABOLIC PNL TOTAL CA: CPT

## 2018-04-06 PROCEDURE — 97161 PT EVAL LOW COMPLEX 20 MIN: CPT

## 2018-04-06 PROCEDURE — 84155 ASSAY OF PROTEIN SERUM: CPT

## 2018-04-06 PROCEDURE — 84484 ASSAY OF TROPONIN QUANT: CPT

## 2018-04-06 PROCEDURE — 6360000004 HC RX CONTRAST MEDICATION: Performed by: STUDENT IN AN ORGANIZED HEALTH CARE EDUCATION/TRAINING PROGRAM

## 2018-04-06 PROCEDURE — 36415 COLL VENOUS BLD VENIPUNCTURE: CPT

## 2018-04-06 PROCEDURE — A9579 GAD-BASE MR CONTRAST NOS,1ML: HCPCS | Performed by: STUDENT IN AN ORGANIZED HEALTH CARE EDUCATION/TRAINING PROGRAM

## 2018-04-06 PROCEDURE — 97116 GAIT TRAINING THERAPY: CPT

## 2018-04-06 PROCEDURE — 6370000000 HC RX 637 (ALT 250 FOR IP): Performed by: STUDENT IN AN ORGANIZED HEALTH CARE EDUCATION/TRAINING PROGRAM

## 2018-04-06 PROCEDURE — G8980 MOBILITY D/C STATUS: HCPCS

## 2018-04-06 PROCEDURE — 85025 COMPLETE CBC W/AUTO DIFF WBC: CPT

## 2018-04-06 PROCEDURE — G8978 MOBILITY CURRENT STATUS: HCPCS

## 2018-04-06 PROCEDURE — 6360000002 HC RX W HCPCS: Performed by: STUDENT IN AN ORGANIZED HEALTH CARE EDUCATION/TRAINING PROGRAM

## 2018-04-06 PROCEDURE — 99226 PR SBSQ OBSERVATION CARE/DAY 35 MINUTES: CPT | Performed by: INTERNAL MEDICINE

## 2018-04-06 PROCEDURE — G0378 HOSPITAL OBSERVATION PER HR: HCPCS

## 2018-04-06 PROCEDURE — 99406 BEHAV CHNG SMOKING 3-10 MIN: CPT

## 2018-04-06 PROCEDURE — G8979 MOBILITY GOAL STATUS: HCPCS

## 2018-04-06 PROCEDURE — 84165 PROTEIN E-PHORESIS SERUM: CPT

## 2018-04-06 RX ORDER — SODIUM CHLORIDE 0.9 % (FLUSH) 0.9 %
10 SYRINGE (ML) INJECTION PRN
Status: DISCONTINUED | OUTPATIENT
Start: 2018-04-06 | End: 2018-04-06 | Stop reason: HOSPADM

## 2018-04-06 RX ORDER — OXYCODONE HYDROCHLORIDE AND ACETAMINOPHEN 5; 325 MG/1; MG/1
1 TABLET ORAL EVERY 4 HOURS PRN
Status: DISCONTINUED | OUTPATIENT
Start: 2018-04-06 | End: 2018-04-06 | Stop reason: HOSPADM

## 2018-04-06 RX ORDER — OXYCODONE HYDROCHLORIDE AND ACETAMINOPHEN 5; 325 MG/1; MG/1
2 TABLET ORAL EVERY 4 HOURS PRN
Status: DISCONTINUED | OUTPATIENT
Start: 2018-04-06 | End: 2018-04-06 | Stop reason: HOSPADM

## 2018-04-06 RX ORDER — LISINOPRIL 2.5 MG/1
2.5 TABLET ORAL DAILY
Qty: 30 TABLET | Refills: 3 | Status: ON HOLD | OUTPATIENT
Start: 2018-04-07 | End: 2018-12-03 | Stop reason: HOSPADM

## 2018-04-06 RX ADMIN — OXYCODONE HYDROCHLORIDE AND ACETAMINOPHEN 2 TABLET: 5; 325 TABLET ORAL at 15:07

## 2018-04-06 RX ADMIN — OXYCODONE HYDROCHLORIDE AND ACETAMINOPHEN 2 TABLET: 5; 325 TABLET ORAL at 09:17

## 2018-04-06 RX ADMIN — ENOXAPARIN SODIUM 40 MG: 40 INJECTION SUBCUTANEOUS at 09:21

## 2018-04-06 RX ADMIN — ASPIRIN 81 MG: 81 TABLET, CHEWABLE ORAL at 09:19

## 2018-04-06 RX ADMIN — PANTOPRAZOLE SODIUM 40 MG: 40 TABLET, DELAYED RELEASE ORAL at 10:33

## 2018-04-06 RX ADMIN — DOCUSATE SODIUM 100 MG: 100 CAPSULE ORAL at 09:20

## 2018-04-06 RX ADMIN — ONDANSETRON HYDROCHLORIDE 4 MG: 4 TABLET, FILM COATED ORAL at 07:39

## 2018-04-06 RX ADMIN — ALPRAZOLAM 0.5 MG: 0.5 TABLET ORAL at 11:34

## 2018-04-06 RX ADMIN — ATORVASTATIN CALCIUM 20 MG: 20 TABLET, FILM COATED ORAL at 09:19

## 2018-04-06 RX ADMIN — OXYCODONE HYDROCHLORIDE AND ACETAMINOPHEN 2 TABLET: 5; 325 TABLET ORAL at 00:43

## 2018-04-06 RX ADMIN — METOPROLOL TARTRATE 6.25 MG: 25 TABLET ORAL at 09:20

## 2018-04-06 RX ADMIN — LISINOPRIL 2.5 MG: 2.5 TABLET ORAL at 09:20

## 2018-04-06 RX ADMIN — GADOTERIDOL 13 ML: 279.3 INJECTION, SOLUTION INTRAVENOUS at 00:15

## 2018-04-06 RX ADMIN — Medication 1000 MG: at 09:21

## 2018-04-06 RX ADMIN — DOCUSATE SODIUM 100 MG: 100 CAPSULE ORAL at 00:45

## 2018-04-06 ASSESSMENT — PAIN SCALES - GENERAL
PAINLEVEL_OUTOF10: 8
PAINLEVEL_OUTOF10: 7

## 2018-04-06 ASSESSMENT — PAIN DESCRIPTION - PROGRESSION: CLINICAL_PROGRESSION: GRADUALLY WORSENING

## 2018-04-06 ASSESSMENT — PAIN DESCRIPTION - PAIN TYPE
TYPE: CHRONIC PAIN
TYPE: ACUTE PAIN

## 2018-04-06 ASSESSMENT — PAIN DESCRIPTION - DESCRIPTORS
DESCRIPTORS: HEADACHE
DESCRIPTORS: ACHING

## 2018-04-06 ASSESSMENT — PAIN DESCRIPTION - LOCATION
LOCATION: HEAD
LOCATION: HEAD

## 2018-04-06 ASSESSMENT — PAIN DESCRIPTION - ONSET: ONSET: PROGRESSIVE

## 2018-04-06 ASSESSMENT — PAIN DESCRIPTION - FREQUENCY: FREQUENCY: INTERMITTENT

## 2018-04-08 LAB
ALBUMIN (CALCULATED): 3.9 G/DL (ref 3.2–5.2)
ALBUMIN PERCENT: 68 % (ref 45–65)
ALPHA 1 PERCENT: 2 % (ref 3–6)
ALPHA 2 PERCENT: 9 % (ref 6–13)
ALPHA-1-GLOBULIN: 0.1 G/DL (ref 0.1–0.4)
ALPHA-2-GLOBULIN: 0.6 G/DL (ref 0.5–0.9)
BETA GLOBULIN: 0.6 G/DL (ref 0.5–1.1)
BETA PERCENT: 10 % (ref 11–19)
GAMMA GLOBULIN %: 11 % (ref 9–20)
GAMMA GLOBULIN: 0.6 G/DL (ref 0.5–1.5)
PATHOLOGIST: ABNORMAL
PROTEIN ELECTROPHORESIS, SERUM: ABNORMAL
TOTAL PROT. SUM,%: 100 % (ref 98–102)
TOTAL PROT. SUM: 5.8 G/DL (ref 6.3–8.2)
TOTAL PROTEIN: 5.8 G/DL (ref 6.4–8.3)

## 2018-06-06 RX ORDER — ONDANSETRON 4 MG/1
4 TABLET, FILM COATED ORAL EVERY 8 HOURS PRN
Qty: 30 TABLET | Refills: 1 | Status: SHIPPED | OUTPATIENT
Start: 2018-06-06 | End: 2018-08-02 | Stop reason: SDUPTHER

## 2018-06-11 ENCOUNTER — OFFICE VISIT (OUTPATIENT)
Dept: ONCOLOGY | Age: 73
End: 2018-06-11
Payer: MEDICARE

## 2018-06-11 ENCOUNTER — HOSPITAL ENCOUNTER (OUTPATIENT)
Facility: MEDICAL CENTER | Age: 73
Discharge: HOME | End: 2018-06-11
Payer: MEDICARE

## 2018-06-11 ENCOUNTER — TELEPHONE (OUTPATIENT)
Dept: ONCOLOGY | Age: 73
End: 2018-06-11

## 2018-06-11 VITALS
DIASTOLIC BLOOD PRESSURE: 61 MMHG | TEMPERATURE: 98.2 F | SYSTOLIC BLOOD PRESSURE: 122 MMHG | RESPIRATION RATE: 18 BRPM | BODY MASS INDEX: 24.13 KG/M2 | WEIGHT: 145 LBS | HEART RATE: 65 BPM

## 2018-06-11 DIAGNOSIS — C88.4 MALT (MUCOSA ASSOCIATED LYMPHOID TISSUE) (HCC): Primary | ICD-10-CM

## 2018-06-11 DIAGNOSIS — C88.4 MALT (MUCOSA ASSOCIATED LYMPHOID TISSUE) (HCC): ICD-10-CM

## 2018-06-11 LAB
ABSOLUTE EOS #: 0.2 K/UL (ref 0–0.4)
ABSOLUTE IMMATURE GRANULOCYTE: ABNORMAL K/UL (ref 0–0.3)
ABSOLUTE LYMPH #: 3 K/UL (ref 1–4.8)
ABSOLUTE MONO #: 1.1 K/UL (ref 0.2–0.8)
ALBUMIN SERPL-MCNC: 4.1 G/DL (ref 3.5–5.2)
ALBUMIN/GLOBULIN RATIO: ABNORMAL (ref 1–2.5)
ALP BLD-CCNC: 65 U/L (ref 40–129)
ALT SERPL-CCNC: 12 U/L (ref 5–41)
ANION GAP SERPL CALCULATED.3IONS-SCNC: 12 MMOL/L (ref 9–17)
AST SERPL-CCNC: 14 U/L
BASOPHILS # BLD: 1 % (ref 0–2)
BASOPHILS ABSOLUTE: 0.1 K/UL (ref 0–0.2)
BILIRUB SERPL-MCNC: 0.35 MG/DL (ref 0.3–1.2)
BUN BLDV-MCNC: 16 MG/DL (ref 8–23)
BUN/CREAT BLD: 13 (ref 9–20)
CALCIUM SERPL-MCNC: 9 MG/DL (ref 8.6–10.4)
CHLORIDE BLD-SCNC: 98 MMOL/L (ref 98–107)
CO2: 27 MMOL/L (ref 20–31)
CREAT SERPL-MCNC: 1.27 MG/DL (ref 0.7–1.2)
DIFFERENTIAL TYPE: ABNORMAL
EOSINOPHILS RELATIVE PERCENT: 3 % (ref 1–4)
GFR AFRICAN AMERICAN: >60 ML/MIN
GFR NON-AFRICAN AMERICAN: 56 ML/MIN
GFR SERPL CREATININE-BSD FRML MDRD: ABNORMAL ML/MIN/{1.73_M2}
GFR SERPL CREATININE-BSD FRML MDRD: ABNORMAL ML/MIN/{1.73_M2}
GLUCOSE BLD-MCNC: 93 MG/DL (ref 70–99)
HCT VFR BLD CALC: 40.6 % (ref 41–53)
HEMOGLOBIN: 13.4 G/DL (ref 13.5–17.5)
IMMATURE GRANULOCYTES: ABNORMAL %
LACTATE DEHYDROGENASE: 177 U/L (ref 135–225)
LYMPHOCYTES # BLD: 32 % (ref 24–44)
MCH RBC QN AUTO: 30.3 PG (ref 26–34)
MCHC RBC AUTO-ENTMCNC: 33.1 G/DL (ref 31–37)
MCV RBC AUTO: 91.8 FL (ref 80–100)
MONOCYTES # BLD: 12 % (ref 1–7)
NRBC AUTOMATED: ABNORMAL PER 100 WBC
PDW BLD-RTO: 17.3 % (ref 11.5–14.5)
PLATELET # BLD: 269 K/UL (ref 130–400)
PLATELET ESTIMATE: ABNORMAL
PMV BLD AUTO: 6.8 FL (ref 6–12)
POTASSIUM SERPL-SCNC: 4.1 MMOL/L (ref 3.7–5.3)
RBC # BLD: 4.43 M/UL (ref 4.5–5.9)
RBC # BLD: ABNORMAL 10*6/UL
SEG NEUTROPHILS: 52 % (ref 36–66)
SEGMENTED NEUTROPHILS ABSOLUTE COUNT: 5 K/UL (ref 1.8–7.7)
SODIUM BLD-SCNC: 137 MMOL/L (ref 135–144)
TOTAL PROTEIN: 6.6 G/DL (ref 6.4–8.3)
WBC # BLD: 9.4 K/UL (ref 3.5–11)
WBC # BLD: ABNORMAL 10*3/UL

## 2018-06-11 PROCEDURE — 99214 OFFICE O/P EST MOD 30 MIN: CPT | Performed by: INTERNAL MEDICINE

## 2018-06-11 PROCEDURE — 80053 COMPREHEN METABOLIC PANEL: CPT

## 2018-06-11 PROCEDURE — 36415 COLL VENOUS BLD VENIPUNCTURE: CPT

## 2018-06-11 PROCEDURE — 99211 OFF/OP EST MAY X REQ PHY/QHP: CPT

## 2018-06-11 PROCEDURE — G8598 ASA/ANTIPLAT THER USED: HCPCS | Performed by: INTERNAL MEDICINE

## 2018-06-11 PROCEDURE — 85025 COMPLETE CBC W/AUTO DIFF WBC: CPT

## 2018-06-11 PROCEDURE — 1123F ACP DISCUSS/DSCN MKR DOCD: CPT | Performed by: INTERNAL MEDICINE

## 2018-06-11 PROCEDURE — 4040F PNEUMOC VAC/ADMIN/RCVD: CPT | Performed by: INTERNAL MEDICINE

## 2018-06-11 PROCEDURE — 4004F PT TOBACCO SCREEN RCVD TLK: CPT | Performed by: INTERNAL MEDICINE

## 2018-06-11 PROCEDURE — G8427 DOCREV CUR MEDS BY ELIG CLIN: HCPCS | Performed by: INTERNAL MEDICINE

## 2018-06-11 PROCEDURE — G8420 CALC BMI NORM PARAMETERS: HCPCS | Performed by: INTERNAL MEDICINE

## 2018-06-11 PROCEDURE — 3017F COLORECTAL CA SCREEN DOC REV: CPT | Performed by: INTERNAL MEDICINE

## 2018-06-11 PROCEDURE — 83615 LACTATE (LD) (LDH) ENZYME: CPT

## 2018-06-13 ENCOUNTER — OFFICE VISIT (OUTPATIENT)
Dept: GASTROENTEROLOGY | Age: 73
End: 2018-06-13
Payer: MEDICARE

## 2018-06-13 ENCOUNTER — TELEPHONE (OUTPATIENT)
Dept: GASTROENTEROLOGY | Age: 73
End: 2018-06-13

## 2018-06-13 VITALS
BODY MASS INDEX: 26.63 KG/M2 | WEIGHT: 160 LBS | HEART RATE: 63 BPM | DIASTOLIC BLOOD PRESSURE: 60 MMHG | SYSTOLIC BLOOD PRESSURE: 110 MMHG

## 2018-06-13 DIAGNOSIS — C88.4 MALT (MUCOSA ASSOCIATED LYMPHOID TISSUE) (HCC): Primary | ICD-10-CM

## 2018-06-13 DIAGNOSIS — D13.2 BRUNNER'S GLAND ADENOMA: ICD-10-CM

## 2018-06-13 DIAGNOSIS — T40.695S: ICD-10-CM

## 2018-06-13 PROCEDURE — 3017F COLORECTAL CA SCREEN DOC REV: CPT | Performed by: INTERNAL MEDICINE

## 2018-06-13 PROCEDURE — G8598 ASA/ANTIPLAT THER USED: HCPCS | Performed by: INTERNAL MEDICINE

## 2018-06-13 PROCEDURE — G8419 CALC BMI OUT NRM PARAM NOF/U: HCPCS | Performed by: INTERNAL MEDICINE

## 2018-06-13 PROCEDURE — 99214 OFFICE O/P EST MOD 30 MIN: CPT | Performed by: INTERNAL MEDICINE

## 2018-06-13 PROCEDURE — 1036F TOBACCO NON-USER: CPT | Performed by: INTERNAL MEDICINE

## 2018-06-13 PROCEDURE — 1123F ACP DISCUSS/DSCN MKR DOCD: CPT | Performed by: INTERNAL MEDICINE

## 2018-06-13 PROCEDURE — 4040F PNEUMOC VAC/ADMIN/RCVD: CPT | Performed by: INTERNAL MEDICINE

## 2018-06-13 PROCEDURE — G8427 DOCREV CUR MEDS BY ELIG CLIN: HCPCS | Performed by: INTERNAL MEDICINE

## 2018-06-13 ASSESSMENT — ENCOUNTER SYMPTOMS
ANAL BLEEDING: 0
DIARRHEA: 0
RESPIRATORY NEGATIVE: 1
BACK PAIN: 1
BLOOD IN STOOL: 0
ABDOMINAL DISTENTION: 0
ABDOMINAL PAIN: 0
RECTAL PAIN: 0
VOMITING: 0
TROUBLE SWALLOWING: 0
CONSTIPATION: 0
NAUSEA: 1
ALLERGIC/IMMUNOLOGIC NEGATIVE: 1

## 2018-06-15 ENCOUNTER — TELEPHONE (OUTPATIENT)
Dept: GASTROENTEROLOGY | Age: 73
End: 2018-06-15

## 2018-08-02 RX ORDER — ONDANSETRON 4 MG/1
4 TABLET, FILM COATED ORAL EVERY 8 HOURS PRN
Qty: 30 TABLET | Refills: 1 | Status: SHIPPED | OUTPATIENT
Start: 2018-08-02 | End: 2018-09-12 | Stop reason: SDUPTHER

## 2018-08-02 NOTE — TELEPHONE ENCOUNTER
Message left requesting refill on ondansetron. Last filled 6/6/18 with 1 refill. RV 12/17/18. Thank-you.

## 2018-08-07 ENCOUNTER — TELEPHONE (OUTPATIENT)
Dept: GASTROENTEROLOGY | Age: 73
End: 2018-08-07

## 2018-08-07 ENCOUNTER — ANESTHESIA EVENT (OUTPATIENT)
Dept: OPERATING ROOM | Age: 73
End: 2018-08-07
Payer: MEDICARE

## 2018-08-07 NOTE — TELEPHONE ENCOUNTER
Writer LM for patient regarding EGD scheduled for tomorrow at [facility] arriving at 715am for 845am procedure. In messages, writer informed patient of procedure date and time and asked for a return call to confirm that they have transportation and if not they will call prior to procedure to reschedule or call in advance to cancel/reschedule.

## 2018-08-08 ENCOUNTER — ANESTHESIA (OUTPATIENT)
Dept: OPERATING ROOM | Age: 73
End: 2018-08-08
Payer: MEDICARE

## 2018-08-08 ENCOUNTER — HOSPITAL ENCOUNTER (OUTPATIENT)
Age: 73
Setting detail: OUTPATIENT SURGERY
Discharge: HOME | End: 2018-08-08
Attending: INTERNAL MEDICINE | Admitting: INTERNAL MEDICINE
Payer: MEDICARE

## 2018-08-08 VITALS
BODY MASS INDEX: 26.19 KG/M2 | SYSTOLIC BLOOD PRESSURE: 142 MMHG | HEIGHT: 65 IN | HEART RATE: 57 BPM | WEIGHT: 157.2 LBS | DIASTOLIC BLOOD PRESSURE: 61 MMHG | TEMPERATURE: 96.8 F | OXYGEN SATURATION: 94 % | RESPIRATION RATE: 11 BRPM

## 2018-08-08 VITALS
OXYGEN SATURATION: 98 % | DIASTOLIC BLOOD PRESSURE: 65 MMHG | RESPIRATION RATE: 11 BRPM | SYSTOLIC BLOOD PRESSURE: 106 MMHG

## 2018-08-08 PROCEDURE — 88305 TISSUE EXAM BY PATHOLOGIST: CPT

## 2018-08-08 PROCEDURE — 7100000010 HC PHASE II RECOVERY - FIRST 15 MIN: Performed by: INTERNAL MEDICINE

## 2018-08-08 PROCEDURE — 2500000003 HC RX 250 WO HCPCS: Performed by: NURSE ANESTHETIST, CERTIFIED REGISTERED

## 2018-08-08 PROCEDURE — 3700000001 HC ADD 15 MINUTES (ANESTHESIA): Performed by: INTERNAL MEDICINE

## 2018-08-08 PROCEDURE — 3609012400 HC EGD TRANSORAL BIOPSY SINGLE/MULTIPLE: Performed by: INTERNAL MEDICINE

## 2018-08-08 PROCEDURE — 2580000003 HC RX 258: Performed by: ANESTHESIOLOGY

## 2018-08-08 PROCEDURE — 2709999900 HC NON-CHARGEABLE SUPPLY: Performed by: INTERNAL MEDICINE

## 2018-08-08 PROCEDURE — 7100000011 HC PHASE II RECOVERY - ADDTL 15 MIN: Performed by: INTERNAL MEDICINE

## 2018-08-08 PROCEDURE — 3700000000 HC ANESTHESIA ATTENDED CARE: Performed by: INTERNAL MEDICINE

## 2018-08-08 PROCEDURE — 6360000002 HC RX W HCPCS: Performed by: NURSE ANESTHETIST, CERTIFIED REGISTERED

## 2018-08-08 RX ORDER — PROPOFOL 10 MG/ML
INJECTION, EMULSION INTRAVENOUS PRN
Status: DISCONTINUED | OUTPATIENT
Start: 2018-08-08 | End: 2018-08-08 | Stop reason: SDUPTHER

## 2018-08-08 RX ORDER — SODIUM CHLORIDE 9 MG/ML
INJECTION, SOLUTION INTRAVENOUS CONTINUOUS
Status: DISCONTINUED | OUTPATIENT
Start: 2018-08-09 | End: 2018-08-08

## 2018-08-08 RX ORDER — ONDANSETRON 2 MG/ML
4 INJECTION INTRAMUSCULAR; INTRAVENOUS
Status: DISCONTINUED | OUTPATIENT
Start: 2018-08-08 | End: 2018-08-08 | Stop reason: HOSPADM

## 2018-08-08 RX ORDER — SODIUM CHLORIDE, SODIUM LACTATE, POTASSIUM CHLORIDE, CALCIUM CHLORIDE 600; 310; 30; 20 MG/100ML; MG/100ML; MG/100ML; MG/100ML
INJECTION, SOLUTION INTRAVENOUS CONTINUOUS
Status: DISCONTINUED | OUTPATIENT
Start: 2018-08-09 | End: 2018-08-08 | Stop reason: HOSPADM

## 2018-08-08 RX ORDER — SUCRALFATE ORAL 1 G/10ML
1 SUSPENSION ORAL 4 TIMES DAILY
Qty: 1200 ML | Refills: 3 | Status: SHIPPED | OUTPATIENT
Start: 2018-08-08 | End: 2019-07-22

## 2018-08-08 RX ORDER — LIDOCAINE HYDROCHLORIDE 20 MG/ML
INJECTION, SOLUTION INFILTRATION; PERINEURAL PRN
Status: DISCONTINUED | OUTPATIENT
Start: 2018-08-08 | End: 2018-08-08 | Stop reason: SDUPTHER

## 2018-08-08 RX ORDER — LIDOCAINE HYDROCHLORIDE 10 MG/ML
1 INJECTION, SOLUTION EPIDURAL; INFILTRATION; INTRACAUDAL; PERINEURAL
Status: DISCONTINUED | OUTPATIENT
Start: 2018-08-09 | End: 2018-08-08 | Stop reason: HOSPADM

## 2018-08-08 RX ORDER — SODIUM CHLORIDE 0.9 % (FLUSH) 0.9 %
10 SYRINGE (ML) INJECTION PRN
Status: DISCONTINUED | OUTPATIENT
Start: 2018-08-08 | End: 2018-08-08 | Stop reason: HOSPADM

## 2018-08-08 RX ORDER — SODIUM CHLORIDE 0.9 % (FLUSH) 0.9 %
10 SYRINGE (ML) INJECTION EVERY 12 HOURS SCHEDULED
Status: DISCONTINUED | OUTPATIENT
Start: 2018-08-08 | End: 2018-08-08 | Stop reason: HOSPADM

## 2018-08-08 RX ADMIN — PROPOFOL 50 MG: 10 INJECTION, EMULSION INTRAVENOUS at 09:09

## 2018-08-08 RX ADMIN — PROPOFOL 30 MG: 10 INJECTION, EMULSION INTRAVENOUS at 09:11

## 2018-08-08 RX ADMIN — SODIUM CHLORIDE, POTASSIUM CHLORIDE, SODIUM LACTATE AND CALCIUM CHLORIDE: 600; 310; 30; 20 INJECTION, SOLUTION INTRAVENOUS at 08:00

## 2018-08-08 RX ADMIN — LIDOCAINE HYDROCHLORIDE 50 MG: 20 INJECTION, SOLUTION INFILTRATION; PERINEURAL at 09:09

## 2018-08-08 RX ADMIN — PROPOFOL 20 MG: 10 INJECTION, EMULSION INTRAVENOUS at 09:13

## 2018-08-08 ASSESSMENT — PAIN SCALES - GENERAL
PAINLEVEL_OUTOF10: 0

## 2018-08-08 ASSESSMENT — PULMONARY FUNCTION TESTS
PIF_VALUE: 0

## 2018-08-08 ASSESSMENT — PAIN DESCRIPTION - DESCRIPTORS: DESCRIPTORS: ACHING

## 2018-08-08 ASSESSMENT — PAIN - FUNCTIONAL ASSESSMENT: PAIN_FUNCTIONAL_ASSESSMENT: 0-10

## 2018-08-08 ASSESSMENT — ENCOUNTER SYMPTOMS: SHORTNESS OF BREATH: 0

## 2018-08-08 NOTE — H&P
REPAIR      LEFT    JOINT REPLACEMENT      Bilat knees    LUMBAR DISC SURGERY      HARDWARE    OTHER SURGICAL HISTORY Left 12/12/2017     1) Arch aortography 2) Selective left subclavian angiography 3) Left subclavian artery angioplasty with 7 mm x 40 mm and 8 mm x 40 mm [place] balloons     TONSILLECTOMY      UPPER GASTROINTESTINAL ENDOSCOPY  4/30/2015    UPPER GASTROINTESTINAL ENDOSCOPY  5/18/16    lipomatous lump; esophagitis; pyogenic granuloma; mild gastritis    UPPER GASTROINTESTINAL ENDOSCOPY  01/25/2017    lipoma; prominet Brunner's gland; gastritis        Medications Prior to Admission:     Prior to Admission medications    Medication Sig Start Date End Date Taking? Authorizing Provider   ondansetron (ZOFRAN) 4 MG tablet Take 1 tablet by mouth every 8 hours as needed for Nausea 8/2/18  Yes Narayan Altamirano MD   HYDROcodone-acetaminophen (NORCO)  MG per tablet Take 1 tablet by mouth every 4 hours as needed for Pain . Yes Historical Provider, MD   Ascorbic Acid (VITAMIN C) 1000 MG tablet Take 1,000 mg by mouth daily   Yes Historical Provider, MD   ALPRAZolam (XANAX PO) Take 0.5 tablets by mouth 2 times daily as needed    Yes Historical Provider, MD   omeprazole (PRILOSEC) 20 MG delayed release capsule TAKE 1 CAPSULE BY MOUTH 2 TIMES DAILY 6/5/17  Yes Narayan Altamirano MD   fentaNYL (DURAGESIC) 50 MCG/HR Place 1 patch onto the skin every other day  .    Yes Historical Provider, MD   lisinopril (PRINIVIL;ZESTRIL) 2.5 MG tablet Take 1 tablet by mouth daily 4/7/18   [place], MD   metoprolol tartrate (LOPRESSOR) 25 MG tablet Take 0.25 tablets by mouth 2 times daily 4/6/18   [place], MD   clopidogrel (PLAVIX) 75 MG tablet Take 1 tablet by mouth daily 12/18/17   Darren Matthews MD   atorvastatin (LIPITOR) 20 MG tablet Take 20 mg by mouth daily    Historical Provider, MD   docusate (COLACE, DULCOLAX) 100 MG CAPS Take 100 mg by mouth 2 times daily

## 2018-08-08 NOTE — ANESTHESIA PRE PROCEDURE
Anesthesia Plan      MAC and general     ASA 3             Anesthetic plan and risks discussed with patient.                       Jeni Mcdaniel MD   8/8/2018

## 2018-08-09 LAB — SURGICAL PATHOLOGY REPORT: NORMAL

## 2018-08-22 ENCOUNTER — TELEPHONE (OUTPATIENT)
Dept: ONCOLOGY | Age: 73
End: 2018-08-22

## 2018-08-22 NOTE — TELEPHONE ENCOUNTER
Mrs. Adra Gallon called left message, her  needs refill on nausea medication. Reviewed chart pt is on zofran, he had 30 pills with one refill ordered 08/02/18. Called Mrs. Herndon back, asked her if she had requested refill from pharmacy, she stated we didn't think the insurance would refill it because it's to soon. Instructed to call pharmacy see if it goes through. Office may have to get some other form of nausea medication or different strength in order to get zofran filled, but she needs to call for refill left on script first.  She verbalized understanding, he only has one pill left.

## 2018-09-12 RX ORDER — ONDANSETRON 4 MG/1
4 TABLET, FILM COATED ORAL EVERY 8 HOURS PRN
Qty: 60 TABLET | Refills: 1 | Status: SHIPPED | OUTPATIENT
Start: 2018-09-12 | End: 2018-11-26 | Stop reason: SDUPTHER

## 2018-09-26 ENCOUNTER — OFFICE VISIT (OUTPATIENT)
Dept: GASTROENTEROLOGY | Age: 73
End: 2018-09-26
Payer: MEDICARE

## 2018-09-26 VITALS
HEART RATE: 59 BPM | SYSTOLIC BLOOD PRESSURE: 117 MMHG | BODY MASS INDEX: 25.79 KG/M2 | WEIGHT: 155 LBS | DIASTOLIC BLOOD PRESSURE: 50 MMHG

## 2018-09-26 DIAGNOSIS — C88.4 MALT (MUCOSA ASSOCIATED LYMPHOID TISSUE) (HCC): ICD-10-CM

## 2018-09-26 DIAGNOSIS — K22.70 BARRETT'S ESOPHAGUS WITHOUT DYSPLASIA: Primary | ICD-10-CM

## 2018-09-26 DIAGNOSIS — K59.00 CONSTIPATION, UNSPECIFIED CONSTIPATION TYPE: ICD-10-CM

## 2018-09-26 PROCEDURE — 99214 OFFICE O/P EST MOD 30 MIN: CPT | Performed by: INTERNAL MEDICINE

## 2018-09-26 PROCEDURE — G8598 ASA/ANTIPLAT THER USED: HCPCS | Performed by: INTERNAL MEDICINE

## 2018-09-26 PROCEDURE — 1101F PT FALLS ASSESS-DOCD LE1/YR: CPT | Performed by: INTERNAL MEDICINE

## 2018-09-26 PROCEDURE — 1036F TOBACCO NON-USER: CPT | Performed by: INTERNAL MEDICINE

## 2018-09-26 PROCEDURE — 1123F ACP DISCUSS/DSCN MKR DOCD: CPT | Performed by: INTERNAL MEDICINE

## 2018-09-26 PROCEDURE — 3017F COLORECTAL CA SCREEN DOC REV: CPT | Performed by: INTERNAL MEDICINE

## 2018-09-26 PROCEDURE — G8419 CALC BMI OUT NRM PARAM NOF/U: HCPCS | Performed by: INTERNAL MEDICINE

## 2018-09-26 PROCEDURE — 4040F PNEUMOC VAC/ADMIN/RCVD: CPT | Performed by: INTERNAL MEDICINE

## 2018-09-26 PROCEDURE — G8427 DOCREV CUR MEDS BY ELIG CLIN: HCPCS | Performed by: INTERNAL MEDICINE

## 2018-09-26 RX ORDER — OMEPRAZOLE 20 MG/1
20 CAPSULE, DELAYED RELEASE ORAL 2 TIMES DAILY
Qty: 120 CAPSULE | Refills: 3 | Status: SHIPPED | OUTPATIENT
Start: 2018-09-26 | End: 2020-12-01

## 2018-09-26 RX ORDER — OMEPRAZOLE 20 MG/1
20 CAPSULE, DELAYED RELEASE ORAL 2 TIMES DAILY
Qty: 120 CAPSULE | Refills: 3 | Status: SHIPPED | OUTPATIENT
Start: 2018-09-26 | End: 2018-09-26 | Stop reason: SDUPTHER

## 2018-09-26 ASSESSMENT — ENCOUNTER SYMPTOMS
DIARRHEA: 0
ABDOMINAL DISTENTION: 0
NAUSEA: 1
ALLERGIC/IMMUNOLOGIC NEGATIVE: 1
BLOOD IN STOOL: 0
CONSTIPATION: 1
BACK PAIN: 1
ABDOMINAL PAIN: 0
RECTAL PAIN: 0
RESPIRATORY NEGATIVE: 1
VOMITING: 0
TROUBLE SWALLOWING: 0
ANAL BLEEDING: 0

## 2018-09-26 NOTE — PROGRESS NOTES
Subjective:      Patient ID: Daniel Varela is a 75 y.o. male. Dr. Nenita Dhaliwal our mutual patient Daniel Varela was seen  for   1. Tracy's esophagus without dysplasia    2. Constipation, unspecified constipation type    3. MALT (mucosa associated lymphoid tissue) ([facility] [address]     . The patient is here as a follow up of his recent GI procedure. The results have been sent to you separately   The findings were explained to the patient in detail and biopsies were also discussed   with him  Gastritis   Tracy's  Has not been taking   Has constipation   Has been on pain meds  He had virtual colonoscopy done about 5 years ago   Patient has been complaining of some abdominal pains, off and on cramping  Also complains of abdominal bloating and gas  Has off and on nausea without any sig vomiting  Has some alternating constipation   Has no weight loss  Has some anxiety issues    Past Medical, Family, and Social History reviewed and does contribute to the patient presenting condition. patient\"s PMH/PSH,SH,PSYCH hx, MEDs, ALLERGIES, and ROS was all reviewed and updated ion the appropriate sections    Gastroesophageal Reflux   He complains of nausea. He reports no abdominal pain. Review of Systems   Constitutional: Negative. HENT: Positive for dental problem. Negative for trouble swallowing (denies). Eyes: Positive for visual disturbance. States eye issue started this morning. Hx of cat sx in April   Respiratory: Negative. Cardiovascular: Negative. Gastrointestinal: Positive for constipation and nausea. Negative for abdominal distention, abdominal pain, anal bleeding, blood in stool, diarrhea, rectal pain and vomiting. Endocrine: Negative. Genitourinary: Negative. Musculoskeletal: Positive for arthralgias and back pain. Skin: Negative. Allergic/Immunologic: Negative. Neurological: Negative. Hematological: Bruises/bleeds easily. Psychiatric/Behavioral: Negative.       Reviewed

## 2018-09-27 RX ORDER — LACTULOSE 10 G/15ML
20 SOLUTION ORAL 3 TIMES DAILY PRN
Qty: 236 ML | Refills: 1 | Status: SHIPPED | OUTPATIENT
Start: 2018-09-27 | End: 2019-09-30 | Stop reason: SDUPTHER

## 2018-10-02 ENCOUNTER — TELEPHONE (OUTPATIENT)
Dept: GASTROENTEROLOGY | Age: 73
End: 2018-10-02

## 2018-11-07 ENCOUNTER — TELEPHONE (OUTPATIENT)
Dept: GASTROENTEROLOGY | Age: 73
End: 2018-11-07

## 2018-11-19 ENCOUNTER — TELEPHONE (OUTPATIENT)
Dept: GASTROENTEROLOGY | Age: 73
End: 2018-11-19

## 2018-11-19 NOTE — TELEPHONE ENCOUNTER
Pts wife called in regards to pts bowel prep. Pts wife Júnior Gastelum thinks that golytely would end up not doing so well with him because that's too much water for him to drink. Writer asks if pt has any kidney disease or issues. Manuel Vilchis states he doesn't. Writer states can change his bowel prep to Suprep, and will have sample ready for  today along with new instructions. Manuel Vilchis states thank you so much for changing it and will be there sometime today to pick sample up.

## 2018-11-20 ENCOUNTER — ANESTHESIA EVENT (OUTPATIENT)
Dept: OPERATING ROOM | Age: 73
End: 2018-11-20
Payer: MEDICARE

## 2018-11-21 ENCOUNTER — HOSPITAL ENCOUNTER (OUTPATIENT)
Age: 73
Setting detail: OUTPATIENT SURGERY
Discharge: HOME | End: 2018-11-21
Attending: INTERNAL MEDICINE | Admitting: INTERNAL MEDICINE
Payer: MEDICARE

## 2018-11-21 ENCOUNTER — ANESTHESIA (OUTPATIENT)
Dept: OPERATING ROOM | Age: 73
End: 2018-11-21
Payer: MEDICARE

## 2018-11-21 VITALS
DIASTOLIC BLOOD PRESSURE: 50 MMHG | BODY MASS INDEX: 24.46 KG/M2 | OXYGEN SATURATION: 100 % | WEIGHT: 146.83 LBS | HEIGHT: 65 IN | TEMPERATURE: 97 F | HEART RATE: 54 BPM | RESPIRATION RATE: 12 BRPM | SYSTOLIC BLOOD PRESSURE: 117 MMHG

## 2018-11-21 VITALS — OXYGEN SATURATION: 100 % | SYSTOLIC BLOOD PRESSURE: 102 MMHG | DIASTOLIC BLOOD PRESSURE: 54 MMHG

## 2018-11-21 PROCEDURE — 88305 TISSUE EXAM BY PATHOLOGIST: CPT

## 2018-11-21 PROCEDURE — 45380 COLONOSCOPY AND BIOPSY: CPT | Performed by: INTERNAL MEDICINE

## 2018-11-21 PROCEDURE — 2500000003 HC RX 250 WO HCPCS: Performed by: NURSE ANESTHETIST, CERTIFIED REGISTERED

## 2018-11-21 PROCEDURE — 2580000003 HC RX 258: Performed by: ANESTHESIOLOGY

## 2018-11-21 PROCEDURE — 6360000002 HC RX W HCPCS: Performed by: NURSE ANESTHETIST, CERTIFIED REGISTERED

## 2018-11-21 PROCEDURE — 3609010600 HC COLONOSCOPY POLYPECTOMY SNARE/COLD BIOPSY: Performed by: INTERNAL MEDICINE

## 2018-11-21 PROCEDURE — 3700000000 HC ANESTHESIA ATTENDED CARE: Performed by: INTERNAL MEDICINE

## 2018-11-21 PROCEDURE — 3700000001 HC ADD 15 MINUTES (ANESTHESIA): Performed by: INTERNAL MEDICINE

## 2018-11-21 PROCEDURE — 7100000010 HC PHASE II RECOVERY - FIRST 15 MIN: Performed by: INTERNAL MEDICINE

## 2018-11-21 PROCEDURE — 7100000011 HC PHASE II RECOVERY - ADDTL 15 MIN: Performed by: INTERNAL MEDICINE

## 2018-11-21 PROCEDURE — 2709999900 HC NON-CHARGEABLE SUPPLY: Performed by: INTERNAL MEDICINE

## 2018-11-21 RX ORDER — LIDOCAINE HYDROCHLORIDE 20 MG/ML
INJECTION, SOLUTION EPIDURAL; INFILTRATION; INTRACAUDAL; PERINEURAL PRN
Status: DISCONTINUED | OUTPATIENT
Start: 2018-11-21 | End: 2018-11-21 | Stop reason: SDUPTHER

## 2018-11-21 RX ORDER — SODIUM CHLORIDE 0.9 % (FLUSH) 0.9 %
10 SYRINGE (ML) INJECTION PRN
Status: DISCONTINUED | OUTPATIENT
Start: 2018-11-21 | End: 2018-11-21 | Stop reason: HOSPADM

## 2018-11-21 RX ORDER — SODIUM CHLORIDE, SODIUM LACTATE, POTASSIUM CHLORIDE, CALCIUM CHLORIDE 600; 310; 30; 20 MG/100ML; MG/100ML; MG/100ML; MG/100ML
INJECTION, SOLUTION INTRAVENOUS CONTINUOUS
Status: DISCONTINUED | OUTPATIENT
Start: 2018-11-21 | End: 2018-11-21 | Stop reason: HOSPADM

## 2018-11-21 RX ORDER — LIDOCAINE HYDROCHLORIDE 10 MG/ML
1 INJECTION, SOLUTION EPIDURAL; INFILTRATION; INTRACAUDAL; PERINEURAL
Status: DISCONTINUED | OUTPATIENT
Start: 2018-11-21 | End: 2018-11-21 | Stop reason: HOSPADM

## 2018-11-21 RX ORDER — SODIUM CHLORIDE 9 MG/ML
INJECTION, SOLUTION INTRAVENOUS CONTINUOUS
Status: DISCONTINUED | OUTPATIENT
Start: 2018-11-21 | End: 2018-11-21

## 2018-11-21 RX ORDER — SODIUM CHLORIDE 0.9 % (FLUSH) 0.9 %
10 SYRINGE (ML) INJECTION EVERY 12 HOURS SCHEDULED
Status: DISCONTINUED | OUTPATIENT
Start: 2018-11-21 | End: 2018-11-21 | Stop reason: HOSPADM

## 2018-11-21 RX ORDER — PROPOFOL 10 MG/ML
INJECTION, EMULSION INTRAVENOUS PRN
Status: DISCONTINUED | OUTPATIENT
Start: 2018-11-21 | End: 2018-11-21 | Stop reason: SDUPTHER

## 2018-11-21 RX ADMIN — SODIUM CHLORIDE, POTASSIUM CHLORIDE, SODIUM LACTATE AND CALCIUM CHLORIDE: 600; 310; 30; 20 INJECTION, SOLUTION INTRAVENOUS at 09:58

## 2018-11-21 RX ADMIN — PROPOFOL 30 MG: 10 INJECTION, EMULSION INTRAVENOUS at 11:34

## 2018-11-21 RX ADMIN — SODIUM CHLORIDE, POTASSIUM CHLORIDE, SODIUM LACTATE AND CALCIUM CHLORIDE: 600; 310; 30; 20 INJECTION, SOLUTION INTRAVENOUS at 11:18

## 2018-11-21 RX ADMIN — PROPOFOL 30 MG: 10 INJECTION, EMULSION INTRAVENOUS at 11:26

## 2018-11-21 RX ADMIN — LIDOCAINE HYDROCHLORIDE 100 MG: 20 INJECTION, SOLUTION EPIDURAL; INFILTRATION; INTRACAUDAL; PERINEURAL at 11:23

## 2018-11-21 RX ADMIN — PROPOFOL 30 MG: 10 INJECTION, EMULSION INTRAVENOUS at 11:23

## 2018-11-21 ASSESSMENT — PULMONARY FUNCTION TESTS
PIF_VALUE: 1

## 2018-11-21 ASSESSMENT — PAIN SCALES - GENERAL
PAINLEVEL_OUTOF10: 0
PAINLEVEL_OUTOF10: 0

## 2018-11-21 ASSESSMENT — PAIN - FUNCTIONAL ASSESSMENT: PAIN_FUNCTIONAL_ASSESSMENT: 0-10

## 2018-11-21 NOTE — ANESTHESIA PRE PROCEDURE
Quynh Alaniz MD at [place] [address] History:    Social History   Substance Use Topics    Smoking status: Former Smoker     Packs/day: 0.25     Years: 30.00     Types: Cigarettes     Quit date: 5/1/2018    Smokeless tobacco: Former User      Comment: QUIT CIGARETTES/SMOKES CIGARS    Alcohol use No      Comment: QUIT; H/O ETOH abuse                                Counseling given: Not Answered      Vital Signs (Current):   Vitals:    11/21/18 0936 11/21/18 0939   BP:  (!) 128/56   Pulse:  61   Resp:  20   Temp:  98.1 °F (36.7 °C)   TempSrc:  Oral   SpO2:  96%   Weight: 146 lb 13.2 oz (66.6 kg)    Height: 5' 5\" (1.651 m)                                               BP Readings from Last 3 Encounters:   11/21/18 (!) 128/56   09/26/18 (!) 117/50   08/08/18 106/65       NPO Status: Time of last liquid consumption: 2300                        Time of last solid consumption: 1600                        Date of last liquid consumption: 11/20/18                        Date of last solid food consumption: 11/19/18    BMI:   Wt Readings from Last 3 Encounters:   11/21/18 146 lb 13.2 oz (66.6 kg)   09/26/18 155 lb (70.3 kg)   08/08/18 157 lb 3.2 oz (71.3 kg)     Body mass index is 24.43 kg/m². CBC:   Lab Results   Component Value Date    WBC 9.4 06/11/2018    RBC 4.43 06/11/2018    HGB 13.4 06/11/2018    HCT 40.6 06/11/2018    MCV 91.8 06/11/2018    RDW 17.3 06/11/2018     06/11/2018       CMP:   Lab Results   Component Value Date     06/11/2018    K 4.1 06/11/2018    CL 98 06/11/2018    CO2 27 06/11/2018    BUN 16 06/11/2018    CREATININE 1.27 06/11/2018    GFRAA >60 06/11/2018    LABGLOM 56 06/11/2018    GLUCOSE 93 06/11/2018    PROT 6.6 06/11/2018    CALCIUM 9.0 06/11/2018    BILITOT 0.35 06/11/2018    ALKPHOS 65 06/11/2018    AST 14 06/11/2018    ALT 12 06/11/2018       POC Tests: No results for input(s): POCGLU, POCNA, POCK, POCCL, POCBUN, POCHEMO, POCHCT in the last 72 hours.     Coags:   Lab Results

## 2018-11-21 NOTE — H&P
History and Physical Service   [facility]    HISTORY AND PHYSICAL EXAMINATION            Date of Evaluation: 11/21/2018  Patient name:  Jan Bruno  MRN:   [id number]  YOB: 1945  PCP:    Shahram Walker    History Obtained From:     Patient, medical records    History of Present Illness: This is Jan Bruno a 75 y.o. male who presents today for a colonoscopy by Dr. Anuradha Belcher. The patient denies any abdominal pain, nausea, vomiting, diarrhea, constipation. No dark stools or bleeding. No weight loss. No family history of colon cancer. No previous colonoscopy. He has a history of gastritis, Tracy's esophagitis, constipation and has been on opioids, intermittent abdomiinal bloating and flatus.     Past Medical History:     Past Medical History:   Diagnosis Date    Arthritis     fentanyl patch    Tracy's esophagus     Brunner's gland adenoma     CAD (coronary artery disease) 3/6/14    stents 2002   CABG 3/6/14    Dysphagia     Esophagitis     Gastritis     GERD (gastroesophageal reflux disease)     Hyperlipidemia     Hypertension     Lipoma     Myocardial infarction (HCC)     STRESS DONE    Nausea & vomiting     Pyogenic granuloma     Vascular abnormality     CAROTID        Past Surgical History:     Past Surgical History:   Procedure Laterality Date    ARTHROPLASTY      RIGHT KNEE X3-INFECTION    ARTHROPLASTY      LEFT KNEE    BRAIN TUMOR EXCISION  2008    excision    CARDIAC CATHETERIZATION      CAROTID ENDARTERECTOMY      LEFT    CERVICAL DISCECTOMY      CERVICAL SPINE SURGERY  6-25-13    ACF 3-4, 4-5    CORONARY ARTERY BYPASS GRAFT  3/6/2014    CABG X 3    INGUINAL HERNIA REPAIR      LEFT    JOINT REPLACEMENT      Bilat knees    LUMBAR DISC SURGERY      HARDWARE    OTHER SURGICAL HISTORY Left 12/12/2017     1) Arch aortography 2) Selective left subclavian angiography 3) Left subclavian artery angioplasty with 7 mm x 40 mm and 8 mm x 40 mm [place] balloons     TONSILLECTOMY      UPPER GASTROINTESTINAL ENDOSCOPY  4/30/2015    UPPER GASTROINTESTINAL ENDOSCOPY  5/18/16    lipomatous lump; esophagitis; pyogenic granuloma; mild gastritis    UPPER GASTROINTESTINAL ENDOSCOPY  01/25/2017    lipoma; prominet Brunner's gland; gastritis    UPPER GASTROINTESTINAL ENDOSCOPY  08/08/2018    CASTILLO'S    UPPER GASTROINTESTINAL ENDOSCOPY  8/8/2018    EGD BIOPSY performed by Cem Veliz MD at [address] [place] [address]        Medications Prior to Admission:     Prior to Admission medications    Medication Sig Start Date End Date Taking? Authorizing Provider   lactulose (CHRONULAC) 10 GM/15ML solution Take 30 mLs by mouth 3 times daily as needed (constipation) 9/27/18  Yes Cem Veliz MD   omeprazole (PRILOSEC) 20 MG delayed release capsule Take 1 capsule by mouth 2 times daily 9/26/18  Yes Cem Veliz MD   ondansetron (ZOFRAN) 4 MG tablet Take 1 tablet by mouth every 8 hours as needed for Nausea 9/12/18  Yes Qian Price MD   sucralfate (CARAFATE) 1 GM/10ML suspension Take 10 mLs by mouth 4 times daily 8/8/18  Yes Cem Veliz MD   metoprolol tartrate (LOPRESSOR) 25 MG tablet Take 0.25 tablets by mouth 2 times daily 4/6/18  Yes Elizabeth Odonnell MD   HYDROcodone-acetaminophen [facility])  MG per tablet Take 1 tablet by mouth every 4 hours as needed for Pain . Yes Historical Provider, MD   Ascorbic Acid (VITAMIN C) 1000 MG tablet Take 1,000 mg by mouth daily   Yes Historical Provider, MD   ALPRAZolam (XANAX PO) Take 0.5 tablets by mouth 2 times daily as needed    Yes Historical Provider, MD   atorvastatin (LIPITOR) 20 MG tablet Take 20 mg by mouth daily   Yes Historical Provider, MD   docusate (COLACE, DULCOLAX) 100 MG CAPS Take 100 mg by mouth 2 times daily 11/21/16  Yes Qian Price MD   fentaNYL (DURAGESIC) 50 MCG/HR Place 1 patch onto the skin every other day  .    Yes Historical Provider, MD   linaclotide Lyn [place]) 290 MCG CAPS capsule Take 1 capsule by mouth every morning (before breakfast) 9/26/18   Dragan Irvin MD   lisinopril (PRINIVIL;ZESTRIL) 2.5 MG tablet Take 1 tablet by mouth daily 4/7/18   Cesar , MD   clopidogrel (PLAVIX) 75 MG tablet Take 1 tablet by mouth daily 12/18/17   Deisi Yang MD   albuterol (PROVENTIL HFA;VENTOLIN HFA) 108 (90 BASE) MCG/ACT inhaler Inhale 2 puffs into the lungs every 6 hours as needed for Wheezing. Historical Provider, MD   aspirin 81 MG chewable tablet Take 1 tablet by mouth daily. 3/11/14   Loida Hernandez MD        Allergies:     Vancomycin    Social History:     Tobacco:    reports that he quit smoking about 6 months ago. His smoking use included Cigarettes. He has a 7.50 pack-year smoking history. He has quit using smokeless tobacco.  Alcohol:      reports that he does not drink alcohol. Drug Use:  reports that he does not use drugs. Family History:     Family History   Problem Relation Age of Onset    Lung Cancer Father     Stroke Maternal Grandmother        Review of Systems:     Positive and Negative as described in HPI. CONSTITUTIONAL:  negative for fevers, chills, sweats, fatigue, weight loss  HEENT:  negative for vision, hearing changes, runny nose, throat pain  RESPIRATORY:  negative for shortness of breath, cough, congestion, wheezing. CARDIOVASCULAR:  negative for chest pain, palpitations.   GASTROINTESTINAL: See HPI.  negative for nausea, vomiting, diarrhea, constipation, change in bowel habits, abdominal pain   GENITOURINARY:  negative for difficulty of urination, burning with urination, frequency   INTEGUMENT:  negative for rash, skin lesions, easy bruising   HEMATOLOGIC/LYMPHATIC:  negative for swelling/edema   ALLERGIC/IMMUNOLOGIC:  negative for urticaria , itching  ENDOCRINE:  negative increase in drinking, increase in urination, hot or cold intolerance  MUSCULOSKELETAL:  negative joint pains, muscle aches, swelling of joints  NEUROLOGICAL:  negative

## 2018-11-23 LAB — SURGICAL PATHOLOGY REPORT: NORMAL

## 2018-11-26 ENCOUNTER — TELEPHONE (OUTPATIENT)
Dept: ONCOLOGY | Age: 73
End: 2018-11-26

## 2018-11-26 RX ORDER — ONDANSETRON 4 MG/1
4 TABLET, FILM COATED ORAL EVERY 8 HOURS PRN
Qty: 60 TABLET | Refills: 1 | Status: SHIPPED | OUTPATIENT
Start: 2018-11-26 | End: 2018-12-17 | Stop reason: SDUPTHER

## 2018-11-28 ENCOUNTER — HOSPITAL ENCOUNTER (OUTPATIENT)
Facility: MEDICAL CENTER | Age: 73
End: 2018-11-28
Payer: MEDICARE

## 2018-11-30 ENCOUNTER — TELEPHONE (OUTPATIENT)
Dept: GASTROENTEROLOGY | Age: 73
End: 2018-11-30

## 2018-11-30 DIAGNOSIS — K22.70 BARRETT'S ESOPHAGUS WITHOUT DYSPLASIA: ICD-10-CM

## 2018-11-30 DIAGNOSIS — C88.4 MALT (MUCOSA ASSOCIATED LYMPHOID TISSUE) (HCC): ICD-10-CM

## 2018-11-30 DIAGNOSIS — K59.00 CONSTIPATION, UNSPECIFIED CONSTIPATION TYPE: ICD-10-CM

## 2018-11-30 PROBLEM — K63.5 COLON POLYP: Status: ACTIVE | Noted: 2018-11-21

## 2018-12-01 ENCOUNTER — APPOINTMENT (OUTPATIENT)
Dept: CT IMAGING | Age: 73
DRG: 392 | End: 2018-12-01
Payer: MEDICARE

## 2018-12-01 ENCOUNTER — HOSPITAL ENCOUNTER (INPATIENT)
Age: 73
LOS: 2 days | Discharge: HOME | DRG: 392 | End: 2018-12-03
Attending: EMERGENCY MEDICINE | Admitting: FAMILY MEDICINE
Payer: MEDICARE

## 2018-12-01 ENCOUNTER — APPOINTMENT (OUTPATIENT)
Dept: GENERAL RADIOLOGY | Age: 73
DRG: 392 | End: 2018-12-01
Payer: MEDICARE

## 2018-12-01 DIAGNOSIS — R07.9 CHEST PAIN, UNSPECIFIED TYPE: Primary | ICD-10-CM

## 2018-12-01 LAB
ABSOLUTE EOS #: 0.44 K/UL (ref 0–0.44)
ABSOLUTE IMMATURE GRANULOCYTE: 0.03 K/UL (ref 0–0.3)
ABSOLUTE LYMPH #: 3.1 K/UL (ref 1.1–3.7)
ABSOLUTE MONO #: 1.09 K/UL (ref 0.1–1.2)
ANION GAP SERPL CALCULATED.3IONS-SCNC: 9 MMOL/L (ref 9–17)
BASOPHILS # BLD: 1 % (ref 0–2)
BASOPHILS ABSOLUTE: 0.12 K/UL (ref 0–0.2)
BUN BLDV-MCNC: 19 MG/DL (ref 8–23)
BUN/CREAT BLD: ABNORMAL (ref 9–20)
CALCIUM SERPL-MCNC: 9.1 MG/DL (ref 8.6–10.4)
CHLORIDE BLD-SCNC: 99 MMOL/L (ref 98–107)
CO2: 28 MMOL/L (ref 20–31)
CREAT SERPL-MCNC: 1.22 MG/DL (ref 0.7–1.2)
DIFFERENTIAL TYPE: ABNORMAL
EOSINOPHILS RELATIVE PERCENT: 3 % (ref 1–4)
GFR AFRICAN AMERICAN: >60 ML/MIN
GFR NON-AFRICAN AMERICAN: 58 ML/MIN
GFR SERPL CREATININE-BSD FRML MDRD: ABNORMAL ML/MIN/{1.73_M2}
GFR SERPL CREATININE-BSD FRML MDRD: ABNORMAL ML/MIN/{1.73_M2}
GLUCOSE BLD-MCNC: 113 MG/DL (ref 70–99)
HCT VFR BLD CALC: 41.9 % (ref 40.7–50.3)
HEMOGLOBIN: 13.8 G/DL (ref 13–17)
IMMATURE GRANULOCYTES: 0 %
LYMPHOCYTES # BLD: 24 % (ref 24–43)
MCH RBC QN AUTO: 30.9 PG (ref 25.2–33.5)
MCHC RBC AUTO-ENTMCNC: 32.9 G/DL (ref 28.4–34.8)
MCV RBC AUTO: 93.7 FL (ref 82.6–102.9)
MONOCYTES # BLD: 8 % (ref 3–12)
NRBC AUTOMATED: 0 PER 100 WBC
PDW BLD-RTO: 14.5 % (ref 11.8–14.4)
PLATELET # BLD: 322 K/UL (ref 138–453)
PLATELET ESTIMATE: ABNORMAL
PMV BLD AUTO: 10.1 FL (ref 8.1–13.5)
POC TROPONIN I: 0 NG/ML (ref 0–0.1)
POC TROPONIN I: 0.01 NG/ML (ref 0–0.1)
POC TROPONIN INTERP: NORMAL
POC TROPONIN INTERP: NORMAL
POTASSIUM SERPL-SCNC: 5.1 MMOL/L (ref 3.7–5.3)
RBC # BLD: 4.47 M/UL (ref 4.21–5.77)
RBC # BLD: ABNORMAL 10*6/UL
SEG NEUTROPHILS: 64 % (ref 36–65)
SEGMENTED NEUTROPHILS ABSOLUTE COUNT: 8.27 K/UL (ref 1.5–8.1)
SODIUM BLD-SCNC: 136 MMOL/L (ref 135–144)
WBC # BLD: 13.1 K/UL (ref 3.5–11.3)
WBC # BLD: ABNORMAL 10*3/UL

## 2018-12-01 PROCEDURE — 93005 ELECTROCARDIOGRAM TRACING: CPT

## 2018-12-01 PROCEDURE — 96374 THER/PROPH/DIAG INJ IV PUSH: CPT

## 2018-12-01 PROCEDURE — 99285 EMERGENCY DEPT VISIT HI MDM: CPT

## 2018-12-01 PROCEDURE — 71046 X-RAY EXAM CHEST 2 VIEWS: CPT

## 2018-12-01 PROCEDURE — 80048 BASIC METABOLIC PNL TOTAL CA: CPT

## 2018-12-01 PROCEDURE — 71275 CT ANGIOGRAPHY CHEST: CPT

## 2018-12-01 PROCEDURE — 6360000002 HC RX W HCPCS: Performed by: STUDENT IN AN ORGANIZED HEALTH CARE EDUCATION/TRAINING PROGRAM

## 2018-12-01 PROCEDURE — 85025 COMPLETE CBC W/AUTO DIFF WBC: CPT

## 2018-12-01 PROCEDURE — 84484 ASSAY OF TROPONIN QUANT: CPT

## 2018-12-01 PROCEDURE — 6360000004 HC RX CONTRAST MEDICATION: Performed by: STUDENT IN AN ORGANIZED HEALTH CARE EDUCATION/TRAINING PROGRAM

## 2018-12-01 PROCEDURE — 1200000000 HC SEMI PRIVATE

## 2018-12-01 PROCEDURE — 6370000000 HC RX 637 (ALT 250 FOR IP): Performed by: STUDENT IN AN ORGANIZED HEALTH CARE EDUCATION/TRAINING PROGRAM

## 2018-12-01 RX ORDER — DOCUSATE SODIUM 100 MG/1
100 CAPSULE, LIQUID FILLED ORAL 2 TIMES DAILY
Status: DISCONTINUED | OUTPATIENT
Start: 2018-12-01 | End: 2018-12-03 | Stop reason: HOSPADM

## 2018-12-01 RX ORDER — CLOPIDOGREL BISULFATE 75 MG/1
75 TABLET ORAL DAILY
Status: DISCONTINUED | OUTPATIENT
Start: 2018-12-02 | End: 2018-12-03 | Stop reason: HOSPADM

## 2018-12-01 RX ORDER — BISACODYL 10 MG
10 SUPPOSITORY, RECTAL RECTAL DAILY PRN
Status: DISCONTINUED | OUTPATIENT
Start: 2018-12-01 | End: 2018-12-03 | Stop reason: HOSPADM

## 2018-12-01 RX ORDER — PANTOPRAZOLE SODIUM 40 MG/1
40 TABLET, DELAYED RELEASE ORAL
Status: DISCONTINUED | OUTPATIENT
Start: 2018-12-02 | End: 2018-12-03 | Stop reason: HOSPADM

## 2018-12-01 RX ORDER — ASCORBIC ACID 500 MG
1000 TABLET ORAL DAILY
Status: DISCONTINUED | OUTPATIENT
Start: 2018-12-02 | End: 2018-12-03 | Stop reason: HOSPADM

## 2018-12-01 RX ORDER — NICOTINE 21 MG/24HR
1 PATCH, TRANSDERMAL 24 HOURS TRANSDERMAL DAILY
Status: DISCONTINUED | OUTPATIENT
Start: 2018-12-02 | End: 2018-12-03 | Stop reason: HOSPADM

## 2018-12-01 RX ORDER — POTASSIUM CHLORIDE 7.45 MG/ML
10 INJECTION INTRAVENOUS PRN
Status: DISCONTINUED | OUTPATIENT
Start: 2018-12-01 | End: 2018-12-03 | Stop reason: HOSPADM

## 2018-12-01 RX ORDER — HYDROCODONE BITARTRATE AND ACETAMINOPHEN 5; 325 MG/1; MG/1
2 TABLET ORAL EVERY 4 HOURS PRN
Status: DISCONTINUED | OUTPATIENT
Start: 2018-12-01 | End: 2018-12-03 | Stop reason: HOSPADM

## 2018-12-01 RX ORDER — FENTANYL 50 UG/H
1 PATCH TRANSDERMAL EVERY OTHER DAY
Status: DISCONTINUED | OUTPATIENT
Start: 2018-12-02 | End: 2018-12-03 | Stop reason: HOSPADM

## 2018-12-01 RX ORDER — ONDANSETRON 2 MG/ML
4 INJECTION INTRAMUSCULAR; INTRAVENOUS ONCE
Status: COMPLETED | OUTPATIENT
Start: 2018-12-01 | End: 2018-12-01

## 2018-12-01 RX ORDER — POTASSIUM CHLORIDE 20MEQ/15ML
40 LIQUID (ML) ORAL PRN
Status: DISCONTINUED | OUTPATIENT
Start: 2018-12-01 | End: 2018-12-03 | Stop reason: HOSPADM

## 2018-12-01 RX ORDER — HYDROCODONE BITARTRATE AND ACETAMINOPHEN 10; 325 MG/1; MG/1
1 TABLET ORAL EVERY 4 HOURS PRN
Status: DISCONTINUED | OUTPATIENT
Start: 2018-12-01 | End: 2018-12-01 | Stop reason: SDUPTHER

## 2018-12-01 RX ORDER — MAGNESIUM SULFATE 1 G/100ML
1 INJECTION INTRAVENOUS PRN
Status: DISCONTINUED | OUTPATIENT
Start: 2018-12-01 | End: 2018-12-03 | Stop reason: HOSPADM

## 2018-12-01 RX ORDER — ASPIRIN 81 MG/1
81 TABLET, CHEWABLE ORAL DAILY
Status: DISCONTINUED | OUTPATIENT
Start: 2018-12-02 | End: 2018-12-03 | Stop reason: HOSPADM

## 2018-12-01 RX ORDER — ONDANSETRON 2 MG/ML
4 INJECTION INTRAMUSCULAR; INTRAVENOUS EVERY 6 HOURS PRN
Status: DISCONTINUED | OUTPATIENT
Start: 2018-12-01 | End: 2018-12-03 | Stop reason: HOSPADM

## 2018-12-01 RX ORDER — ALPRAZOLAM 0.25 MG/1
0.5 TABLET ORAL 2 TIMES DAILY PRN
Status: DISCONTINUED | OUTPATIENT
Start: 2018-12-01 | End: 2018-12-03 | Stop reason: HOSPADM

## 2018-12-01 RX ORDER — NITROGLYCERIN 0.4 MG/1
0.4 TABLET SUBLINGUAL EVERY 5 MIN PRN
Status: DISCONTINUED | OUTPATIENT
Start: 2018-12-01 | End: 2018-12-03 | Stop reason: HOSPADM

## 2018-12-01 RX ORDER — SUCRALFATE 1 G/1
1 TABLET ORAL EVERY 6 HOURS SCHEDULED
Status: DISCONTINUED | OUTPATIENT
Start: 2018-12-02 | End: 2018-12-03 | Stop reason: HOSPADM

## 2018-12-01 RX ORDER — SODIUM CHLORIDE 0.9 % (FLUSH) 0.9 %
10 SYRINGE (ML) INJECTION PRN
Status: DISCONTINUED | OUTPATIENT
Start: 2018-12-01 | End: 2018-12-03 | Stop reason: HOSPADM

## 2018-12-01 RX ORDER — ALBUTEROL SULFATE 90 UG/1
2 AEROSOL, METERED RESPIRATORY (INHALATION) EVERY 6 HOURS PRN
Status: DISCONTINUED | OUTPATIENT
Start: 2018-12-01 | End: 2018-12-03 | Stop reason: HOSPADM

## 2018-12-01 RX ORDER — LACTULOSE 10 G/15ML
20 SOLUTION ORAL 3 TIMES DAILY PRN
Status: DISCONTINUED | OUTPATIENT
Start: 2018-12-01 | End: 2018-12-03 | Stop reason: HOSPADM

## 2018-12-01 RX ORDER — ATORVASTATIN CALCIUM 20 MG/1
20 TABLET, FILM COATED ORAL DAILY
Status: DISCONTINUED | OUTPATIENT
Start: 2018-12-02 | End: 2018-12-03 | Stop reason: HOSPADM

## 2018-12-01 RX ORDER — ACETAMINOPHEN 325 MG/1
650 TABLET ORAL EVERY 4 HOURS PRN
Status: DISCONTINUED | OUTPATIENT
Start: 2018-12-01 | End: 2018-12-03 | Stop reason: HOSPADM

## 2018-12-01 RX ORDER — POTASSIUM CHLORIDE 20 MEQ/1
40 TABLET, EXTENDED RELEASE ORAL PRN
Status: DISCONTINUED | OUTPATIENT
Start: 2018-12-01 | End: 2018-12-03 | Stop reason: HOSPADM

## 2018-12-01 RX ORDER — ASPIRIN 81 MG/1
324 TABLET, CHEWABLE ORAL ONCE
Status: COMPLETED | OUTPATIENT
Start: 2018-12-01 | End: 2018-12-01

## 2018-12-01 RX ORDER — SODIUM CHLORIDE 0.9 % (FLUSH) 0.9 %
10 SYRINGE (ML) INJECTION EVERY 12 HOURS SCHEDULED
Status: DISCONTINUED | OUTPATIENT
Start: 2018-12-01 | End: 2018-12-03 | Stop reason: HOSPADM

## 2018-12-01 RX ORDER — HYDROCODONE BITARTRATE AND ACETAMINOPHEN 5; 325 MG/1; MG/1
1 TABLET ORAL EVERY 4 HOURS PRN
Status: DISCONTINUED | OUTPATIENT
Start: 2018-12-01 | End: 2018-12-03 | Stop reason: HOSPADM

## 2018-12-01 RX ORDER — ALBUTEROL SULFATE 2.5 MG/3ML
2.5 SOLUTION RESPIRATORY (INHALATION)
Status: DISCONTINUED | OUTPATIENT
Start: 2018-12-01 | End: 2018-12-03 | Stop reason: HOSPADM

## 2018-12-01 RX ORDER — DOCUSATE SODIUM 100 MG/1
100 CAPSULE, LIQUID FILLED ORAL 2 TIMES DAILY
Status: DISCONTINUED | OUTPATIENT
Start: 2018-12-01 | End: 2018-12-01

## 2018-12-01 RX ORDER — ALPRAZOLAM 0.25 MG/1
0.5 TABLET ORAL ONCE
Status: COMPLETED | OUTPATIENT
Start: 2018-12-01 | End: 2018-12-01

## 2018-12-01 RX ADMIN — ASPIRIN 81 MG 81 MG: 81 TABLET ORAL at 19:12

## 2018-12-01 RX ADMIN — ALPRAZOLAM 0.5 MG: 0.25 TABLET ORAL at 20:33

## 2018-12-01 RX ADMIN — IOPAMIDOL 75 ML: 755 INJECTION, SOLUTION INTRAVENOUS at 19:52

## 2018-12-01 RX ADMIN — ONDANSETRON 4 MG: 2 INJECTION INTRAMUSCULAR; INTRAVENOUS at 19:12

## 2018-12-01 ASSESSMENT — PAIN DESCRIPTION - PAIN TYPE: TYPE: ACUTE PAIN

## 2018-12-01 ASSESSMENT — PAIN DESCRIPTION - ONSET: ONSET: ON-GOING

## 2018-12-01 ASSESSMENT — PAIN SCALES - GENERAL
PAINLEVEL_OUTOF10: 2
PAINLEVEL_OUTOF10: 0

## 2018-12-01 ASSESSMENT — PAIN DESCRIPTION - DESCRIPTORS: DESCRIPTORS: ACHING;CONSTANT

## 2018-12-01 ASSESSMENT — PAIN DESCRIPTION - ORIENTATION: ORIENTATION: LEFT

## 2018-12-01 ASSESSMENT — PAIN DESCRIPTION - FREQUENCY: FREQUENCY: CONTINUOUS

## 2018-12-02 LAB
ALBUMIN SERPL-MCNC: 3.5 G/DL (ref 3.5–5.2)
ALBUMIN/GLOBULIN RATIO: 1.5 (ref 1–2.5)
ALP BLD-CCNC: 59 U/L (ref 40–129)
ALT SERPL-CCNC: 7 U/L (ref 5–41)
AMYLASE: 35 U/L (ref 28–100)
ANION GAP SERPL CALCULATED.3IONS-SCNC: 9 MMOL/L (ref 9–17)
AST SERPL-CCNC: 9 U/L
BILIRUB SERPL-MCNC: 0.53 MG/DL (ref 0.3–1.2)
BUN BLDV-MCNC: 16 MG/DL (ref 8–23)
BUN/CREAT BLD: ABNORMAL (ref 9–20)
CALCIUM SERPL-MCNC: 9 MG/DL (ref 8.6–10.4)
CHLORIDE BLD-SCNC: 102 MMOL/L (ref 98–107)
CHOLESTEROL/HDL RATIO: 3.3
CHOLESTEROL: 129 MG/DL
CO2: 25 MMOL/L (ref 20–31)
CREAT SERPL-MCNC: 1.15 MG/DL (ref 0.7–1.2)
GFR AFRICAN AMERICAN: >60 ML/MIN
GFR NON-AFRICAN AMERICAN: >60 ML/MIN
GFR SERPL CREATININE-BSD FRML MDRD: ABNORMAL ML/MIN/{1.73_M2}
GFR SERPL CREATININE-BSD FRML MDRD: ABNORMAL ML/MIN/{1.73_M2}
GLUCOSE BLD-MCNC: 92 MG/DL (ref 70–99)
HCT VFR BLD CALC: 39.5 % (ref 40.7–50.3)
HDLC SERPL-MCNC: 39 MG/DL
HEMOGLOBIN: 13.1 G/DL (ref 13–17)
LDL CHOLESTEROL: 73 MG/DL (ref 0–130)
LIPASE: 10 U/L (ref 13–60)
MAGNESIUM: 2 MG/DL (ref 1.6–2.6)
MCH RBC QN AUTO: 30.6 PG (ref 25.2–33.5)
MCHC RBC AUTO-ENTMCNC: 33.2 G/DL (ref 28.4–34.8)
MCV RBC AUTO: 92.3 FL (ref 82.6–102.9)
NRBC AUTOMATED: 0 PER 100 WBC
PDW BLD-RTO: 14.3 % (ref 11.8–14.4)
PLATELET # BLD: 267 K/UL (ref 138–453)
PMV BLD AUTO: 9.6 FL (ref 8.1–13.5)
POTASSIUM SERPL-SCNC: 3.9 MMOL/L (ref 3.7–5.3)
RBC # BLD: 4.28 M/UL (ref 4.21–5.77)
SODIUM BLD-SCNC: 136 MMOL/L (ref 135–144)
TOTAL PROTEIN: 5.8 G/DL (ref 6.4–8.3)
TRIGL SERPL-MCNC: 83 MG/DL
TROPONIN INTERP: NORMAL
TROPONIN INTERP: NORMAL
TROPONIN T: <0.03 NG/ML
TROPONIN T: <0.03 NG/ML
VLDLC SERPL CALC-MCNC: ABNORMAL MG/DL (ref 1–30)
WBC # BLD: 10.5 K/UL (ref 3.5–11.3)

## 2018-12-02 PROCEDURE — 2580000003 HC RX 258: Performed by: NURSE PRACTITIONER

## 2018-12-02 PROCEDURE — 36415 COLL VENOUS BLD VENIPUNCTURE: CPT

## 2018-12-02 PROCEDURE — 6360000002 HC RX W HCPCS: Performed by: NURSE PRACTITIONER

## 2018-12-02 PROCEDURE — 80053 COMPREHEN METABOLIC PANEL: CPT

## 2018-12-02 PROCEDURE — 6370000000 HC RX 637 (ALT 250 FOR IP): Performed by: NURSE PRACTITIONER

## 2018-12-02 PROCEDURE — 99222 1ST HOSP IP/OBS MODERATE 55: CPT | Performed by: INTERNAL MEDICINE

## 2018-12-02 PROCEDURE — 83690 ASSAY OF LIPASE: CPT

## 2018-12-02 PROCEDURE — 80061 LIPID PANEL: CPT

## 2018-12-02 PROCEDURE — 99223 1ST HOSP IP/OBS HIGH 75: CPT | Performed by: INTERNAL MEDICINE

## 2018-12-02 PROCEDURE — 94762 N-INVAS EAR/PLS OXIMTRY CONT: CPT

## 2018-12-02 PROCEDURE — 1200000000 HC SEMI PRIVATE

## 2018-12-02 PROCEDURE — 82150 ASSAY OF AMYLASE: CPT

## 2018-12-02 PROCEDURE — 84484 ASSAY OF TROPONIN QUANT: CPT

## 2018-12-02 PROCEDURE — 85027 COMPLETE CBC AUTOMATED: CPT

## 2018-12-02 PROCEDURE — 6370000000 HC RX 637 (ALT 250 FOR IP): Performed by: INTERNAL MEDICINE

## 2018-12-02 PROCEDURE — 93005 ELECTROCARDIOGRAM TRACING: CPT

## 2018-12-02 PROCEDURE — 83735 ASSAY OF MAGNESIUM: CPT

## 2018-12-02 RX ORDER — ISOSORBIDE MONONITRATE 30 MG/1
30 TABLET, EXTENDED RELEASE ORAL DAILY
Status: DISCONTINUED | OUTPATIENT
Start: 2018-12-02 | End: 2018-12-03 | Stop reason: HOSPADM

## 2018-12-02 RX ORDER — UREA 10 %
3 LOTION (ML) TOPICAL NIGHTLY PRN
Status: DISCONTINUED | OUTPATIENT
Start: 2018-12-02 | End: 2018-12-03 | Stop reason: HOSPADM

## 2018-12-02 RX ORDER — CALCIUM CARBONATE 200(500)MG
500 TABLET,CHEWABLE ORAL 3 TIMES DAILY PRN
Status: DISCONTINUED | OUTPATIENT
Start: 2018-12-02 | End: 2018-12-03 | Stop reason: HOSPADM

## 2018-12-02 RX ADMIN — HYDROCODONE BITARTRATE AND ACETAMINOPHEN 2 TABLET: 5; 325 TABLET ORAL at 17:44

## 2018-12-02 RX ADMIN — DOCUSATE SODIUM 100 MG: 100 CAPSULE, LIQUID FILLED ORAL at 09:49

## 2018-12-02 RX ADMIN — Medication 10 ML: at 09:49

## 2018-12-02 RX ADMIN — PANTOPRAZOLE SODIUM 40 MG: 40 TABLET, DELAYED RELEASE ORAL at 17:45

## 2018-12-02 RX ADMIN — ATORVASTATIN CALCIUM 20 MG: 20 TABLET, FILM COATED ORAL at 09:49

## 2018-12-02 RX ADMIN — SUCRALFATE 1 G: 1 TABLET ORAL at 05:56

## 2018-12-02 RX ADMIN — METOPROLOL TARTRATE 6.25 MG: 25 TABLET ORAL at 09:48

## 2018-12-02 RX ADMIN — ISOSORBIDE MONONITRATE 30 MG: 30 TABLET ORAL at 17:44

## 2018-12-02 RX ADMIN — SUCRALFATE 1 G: 1 TABLET ORAL at 13:18

## 2018-12-02 RX ADMIN — DOCUSATE SODIUM 100 MG: 100 CAPSULE, LIQUID FILLED ORAL at 00:07

## 2018-12-02 RX ADMIN — ASPIRIN 81 MG: 81 TABLET, CHEWABLE ORAL at 09:49

## 2018-12-02 RX ADMIN — Medication 1000 MG: at 09:48

## 2018-12-02 RX ADMIN — PANTOPRAZOLE SODIUM 40 MG: 40 TABLET, DELAYED RELEASE ORAL at 05:56

## 2018-12-02 RX ADMIN — SUCRALFATE 1 G: 1 TABLET ORAL at 17:44

## 2018-12-02 RX ADMIN — Medication 10 ML: at 21:23

## 2018-12-02 RX ADMIN — ALPRAZOLAM 0.5 MG: 0.25 TABLET ORAL at 12:46

## 2018-12-02 RX ADMIN — SUCRALFATE 1 G: 1 TABLET ORAL at 00:10

## 2018-12-02 RX ADMIN — HYDROCODONE BITARTRATE AND ACETAMINOPHEN 2 TABLET: 5; 325 TABLET ORAL at 21:46

## 2018-12-02 RX ADMIN — Medication 10 ML: at 00:15

## 2018-12-02 RX ADMIN — ENOXAPARIN SODIUM 40 MG: 40 INJECTION SUBCUTANEOUS at 09:48

## 2018-12-02 RX ADMIN — CLOPIDOGREL 75 MG: 75 TABLET, FILM COATED ORAL at 09:49

## 2018-12-02 RX ADMIN — HYDROCODONE BITARTRATE AND ACETAMINOPHEN 2 TABLET: 5; 325 TABLET ORAL at 06:02

## 2018-12-02 RX ADMIN — Medication 3 MG: at 23:37

## 2018-12-02 RX ADMIN — ALPRAZOLAM 0.5 MG: 0.25 TABLET ORAL at 06:02

## 2018-12-02 RX ADMIN — DOCUSATE SODIUM 100 MG: 100 CAPSULE, LIQUID FILLED ORAL at 21:22

## 2018-12-02 ASSESSMENT — ENCOUNTER SYMPTOMS
VOMITING: 0
PHOTOPHOBIA: 0
RHINORRHEA: 0
NAUSEA: 0
ABDOMINAL PAIN: 0
BACK PAIN: 0
SHORTNESS OF BREATH: 0
COUGH: 0

## 2018-12-02 ASSESSMENT — PAIN SCALES - GENERAL
PAINLEVEL_OUTOF10: 6
PAINLEVEL_OUTOF10: 8

## 2018-12-02 NOTE — H&P
mL/min    GFR African American >60 >60 mL/min    GFR Comment          GFR Staging NOT REPORTED    Magnesium    Collection Time: 12/02/18  4:57 AM   Result Value Ref Range    Magnesium 2.0 1.6 - 2.6 mg/dL   Lipid panel - fasting    Collection Time: 12/02/18  4:57 AM   Result Value Ref Range    Cholesterol 129 <200 mg/dL    HDL 39 (L) >40 mg/dL    LDL Cholesterol 73 0 - 130 mg/dL    Chol/HDL Ratio 3.3 <5    Triglycerides 83 <150 mg/dL    VLDL NOT REPORTED 1 - 30 mg/dL   CBC    Collection Time: 12/02/18  4:57 AM   Result Value Ref Range    WBC 10.5 3.5 - 11.3 k/uL    RBC 4.28 4.21 - 5.77 m/uL    Hemoglobin 13.1 13.0 - 17.0 g/dL    Hematocrit 39.5 (L) 40.7 - 50.3 %    MCV 92.3 82.6 - 102.9 fL    MCH 30.6 25.2 - 33.5 pg    MCHC 33.2 28.4 - 34.8 g/dL    RDW 14.3 11.8 - 14.4 %    Platelets 549 829 - 842 k/uL    MPV 9.6 8.1 - 13.5 fL    NRBC Automated 0.0 0.0 per 100 WBC   Troponin    Collection Time: 12/02/18  4:57 AM   Result Value Ref Range    Troponin T <0.03 <0.03 ng/mL    Troponin Interp         EKG 12 lead    Collection Time: 12/02/18  5:56 AM   Result Value Ref Range    Ventricular Rate 53 BPM    Atrial Rate 53 BPM    P-R Interval 162 ms    QRS Duration 82 ms    Q-T Interval 472 ms    QTc Calculation (Bazett) 442 ms    P Axis 59 degrees    R Axis 74 degrees    T Axis 75 degrees       Imaging/Diagnostics:  CTA chest  No evidence of dissection.  Coronary artery disease.       New right upper lobe pulmonary nodule.  Stable right lower lobe pulmonary   nodule.  Multiple smaller stable nodules.  No further follow-up required as   below.      CXR  No acute process  EKG  Sinus bradycardia  Otherwise normal ECG  When compared with ECG of 01-DEC-2018 21:05,  ST no longer elevated in Inferior leads  Assessment :      Primary Problem  Atypical chest pain    Active Hospital Problems    Diagnosis Date Noted    Atypical chest pain [R07.89]      Priority: High    Constipation [K59.00] 09/26/2018    Tracy's esophagus [K22.70]

## 2018-12-02 NOTE — CONSULTS
Today's Date: 12/2/2018  Patient Name: Aparna Ovalle  Date of admission: 12/1/2018  6:36 PM  Patient's age: 75 y.o., 1945  Admission Dx: Chest pain [R07.9]    Reason for Consult: management recommendations  Requesting Physician: Clarisse Londono DO    CHIEF COMPLAINT:  Chest pain    History Obtained From:  patient, electronic medical record    HISTORY OF PRESENT ILLNESS:      The patient is a 75 y.o.  male who is admitted to the hospital for chief complaint of chest pain. This pain started about 2 days ago. Patient has history of coronary artery disease status post CABG. Patient also has history of gastric MALT lymphoma, stage I E needed patient underwent EMR for gastric lesion. Fecal testing was positive for H. pylori. Patient completed treatment for H pylori. Patient underwent surveillance EGD earlier in August.  Biopsy from EGD was negative for lymphoma. Testing was negative for H. pylori was negative well. Also underwent colonoscopy which did not show any malignancy. CT chest for chest pain evaluation is negative for pulmonary embolism. First set of troponin is negative. .  EGD showed Tracy's esophagus. chest shows new 5 mm right upper lobe lung nodule. Previous noted pulmonary nodules are stable    Past Medical History:   has a past medical history of Arthritis; Tracy's esophagus; Brunner's gland adenoma; CAD (coronary artery disease); Colon polyp; Dysphagia; Esophagitis; Gastritis; GERD (gastroesophageal reflux disease); Hyperlipidemia; Hypertension; Lipoma; Myocardial infarction ([address] Nausea & vomiting; Pyogenic granuloma; and Vascular abnormality. Past Surgical History:   has a past surgical history that includes Cardiac catheterization; Carotid endarterectomy; Cervical discectomy; Lumbar disc surgery; arthroplasty; arthroplasty; Tonsillectomy; Inguinal hernia repair; Brain tumor excision (2008); Cervical spine surgery (6-25-13);  Coronary artery bypass graft (3/6/2014);

## 2018-12-02 NOTE — PROGRESS NOTES
lungs every 6 hours as needed for Wheezing. Yes Historical Provider, MD   fentaNYL (DURAGESIC) 50 MCG/HR Place 1 patch onto the skin every other day  . Yes Historical Provider, MD   aspirin 81 MG chewable tablet Take 1 tablet by mouth daily. 3/11/14  Yes Alyssa Olvera MD   .  Recent Surgical History: None = 0     Assessment   Received pt on RA, awake, no distress. Pt states he smokes cigars, denies home oxygen or home inhalers.         RR 15  Breath Sounds: clear apices, diminished bases      · Bronchodilator assessment at level  1  ·   ·   · [x]    Bronchodilator Assessment  BRONCHODILATOR ASSESSMENT SCORE  Score 0 1 2 3 4 5   Breath Sounds   []  Patient Baseline [x]  No Wheeze good aeration []  Faint, scattered wheezing, good aeration []  Expiratory Wheezing and or moderately diminished []  Insp/Exp wheeze and/or very diminished []  Insp/Exp and/ or marked distress   Respiratory Rate   []  Patient Baseline [x]  Less than 20 []  Less than 20 []  20-25 []  Greater than 25 []  Greater than 25   Peak flow % of Pred or PB [x]  NA   []  Greater than 90%  []  81-90% []  71-80% []  Less than or equal to 70%  or unable to perform []  Unable due to Respiratory Distress   Dyspnea re []  Patient Baseline [x]  No SOB []  No SOB []  SOB on exertion []  SOB min activity []  At rest/acute   e FEV% Predicted       [x]  NA []  Above 69%  []  Unable []  Above 60-69%  []  Unable []  Above 50-59%  []  Unable []  Above 35-49%  []  Unable []  Less than 35%  []  Unable

## 2018-12-03 ENCOUNTER — TELEPHONE (OUTPATIENT)
Dept: ONCOLOGY | Age: 73
End: 2018-12-03

## 2018-12-03 VITALS
HEIGHT: 65 IN | SYSTOLIC BLOOD PRESSURE: 107 MMHG | TEMPERATURE: 98 F | DIASTOLIC BLOOD PRESSURE: 50 MMHG | BODY MASS INDEX: 24.87 KG/M2 | WEIGHT: 149.25 LBS | OXYGEN SATURATION: 98 % | HEART RATE: 46 BPM | RESPIRATION RATE: 16 BRPM

## 2018-12-03 PROBLEM — K59.00 CONSTIPATION: Status: RESOLVED | Noted: 2018-09-26 | Resolved: 2018-12-03

## 2018-12-03 LAB
EKG ATRIAL RATE: 53 BPM
EKG ATRIAL RATE: 56 BPM
EKG ATRIAL RATE: 70 BPM
EKG P AXIS: 48 DEGREES
EKG P AXIS: 59 DEGREES
EKG P AXIS: 8 DEGREES
EKG P-R INTERVAL: 140 MS
EKG P-R INTERVAL: 156 MS
EKG P-R INTERVAL: 162 MS
EKG Q-T INTERVAL: 392 MS
EKG Q-T INTERVAL: 422 MS
EKG Q-T INTERVAL: 472 MS
EKG QRS DURATION: 68 MS
EKG QRS DURATION: 82 MS
EKG QRS DURATION: 84 MS
EKG QTC CALCULATION (BAZETT): 407 MS
EKG QTC CALCULATION (BAZETT): 423 MS
EKG QTC CALCULATION (BAZETT): 442 MS
EKG R AXIS: 64 DEGREES
EKG R AXIS: 65 DEGREES
EKG R AXIS: 74 DEGREES
EKG T AXIS: 11 DEGREES
EKG T AXIS: 75 DEGREES
EKG T AXIS: 94 DEGREES
EKG VENTRICULAR RATE: 53 BPM
EKG VENTRICULAR RATE: 56 BPM
EKG VENTRICULAR RATE: 70 BPM

## 2018-12-03 PROCEDURE — G0009 ADMIN PNEUMOCOCCAL VACCINE: HCPCS | Performed by: INTERNAL MEDICINE

## 2018-12-03 PROCEDURE — 6360000002 HC RX W HCPCS: Performed by: INTERNAL MEDICINE

## 2018-12-03 PROCEDURE — 6370000000 HC RX 637 (ALT 250 FOR IP): Performed by: NURSE PRACTITIONER

## 2018-12-03 PROCEDURE — 6360000002 HC RX W HCPCS: Performed by: NURSE PRACTITIONER

## 2018-12-03 PROCEDURE — 94762 N-INVAS EAR/PLS OXIMTRY CONT: CPT

## 2018-12-03 PROCEDURE — 6370000000 HC RX 637 (ALT 250 FOR IP): Performed by: INTERNAL MEDICINE

## 2018-12-03 PROCEDURE — 90686 IIV4 VACC NO PRSV 0.5 ML IM: CPT | Performed by: INTERNAL MEDICINE

## 2018-12-03 PROCEDURE — 90670 PCV13 VACCINE IM: CPT | Performed by: INTERNAL MEDICINE

## 2018-12-03 PROCEDURE — 99239 HOSP IP/OBS DSCHRG MGMT >30: CPT | Performed by: FAMILY MEDICINE

## 2018-12-03 PROCEDURE — 2580000003 HC RX 258: Performed by: NURSE PRACTITIONER

## 2018-12-03 PROCEDURE — G0008 ADMIN INFLUENZA VIRUS VAC: HCPCS | Performed by: INTERNAL MEDICINE

## 2018-12-03 RX ORDER — ISOSORBIDE MONONITRATE 30 MG/1
30 TABLET, EXTENDED RELEASE ORAL DAILY
Qty: 30 TABLET | Refills: 3 | Status: SHIPPED | OUTPATIENT
Start: 2018-12-04 | End: 2018-12-03

## 2018-12-03 RX ORDER — NITROGLYCERIN 0.4 MG/1
TABLET SUBLINGUAL
Qty: 25 TABLET | Refills: 3 | Status: SHIPPED | OUTPATIENT
Start: 2018-12-03 | End: 2018-12-03

## 2018-12-03 RX ORDER — ISOSORBIDE MONONITRATE 30 MG/1
30 TABLET, EXTENDED RELEASE ORAL DAILY
Qty: 30 TABLET | Refills: 3 | Status: SHIPPED | OUTPATIENT
Start: 2018-12-04 | End: 2021-01-11

## 2018-12-03 RX ORDER — CALCIUM CARBONATE 200(500)MG
500 TABLET,CHEWABLE ORAL 3 TIMES DAILY PRN
Qty: 90 TABLET | Refills: 0 | Status: SHIPPED | OUTPATIENT
Start: 2018-12-03 | End: 2018-12-03

## 2018-12-03 RX ORDER — NITROGLYCERIN 0.4 MG/1
TABLET SUBLINGUAL
Qty: 25 TABLET | Refills: 3 | Status: SHIPPED | OUTPATIENT
Start: 2018-12-03

## 2018-12-03 RX ORDER — CALCIUM CARBONATE 200(500)MG
500 TABLET,CHEWABLE ORAL 3 TIMES DAILY PRN
Qty: 90 TABLET | Refills: 0 | Status: SHIPPED | OUTPATIENT
Start: 2018-12-03 | End: 2019-01-02

## 2018-12-03 RX ADMIN — DOCUSATE SODIUM 100 MG: 100 CAPSULE, LIQUID FILLED ORAL at 09:25

## 2018-12-03 RX ADMIN — SUCRALFATE 1 G: 1 TABLET ORAL at 01:20

## 2018-12-03 RX ADMIN — HYDROCODONE BITARTRATE AND ACETAMINOPHEN 2 TABLET: 5; 325 TABLET ORAL at 09:38

## 2018-12-03 RX ADMIN — CLOPIDOGREL 75 MG: 75 TABLET, FILM COATED ORAL at 09:25

## 2018-12-03 RX ADMIN — Medication 10 ML: at 09:26

## 2018-12-03 RX ADMIN — Medication 1000 MG: at 09:16

## 2018-12-03 RX ADMIN — ALPRAZOLAM 0.5 MG: 0.25 TABLET ORAL at 00:46

## 2018-12-03 RX ADMIN — PANTOPRAZOLE SODIUM 40 MG: 40 TABLET, DELAYED RELEASE ORAL at 09:16

## 2018-12-03 RX ADMIN — ANTACID TABLETS 500 MG: 500 TABLET, CHEWABLE ORAL at 09:25

## 2018-12-03 RX ADMIN — INFLUENZA A VIRUS A/MICHIGAN/45/2015 X-275 (H1N1) ANTIGEN (FORMALDEHYDE INACTIVATED), INFLUENZA A VIRUS A/SINGAPORE/INFIMH-16-0019/2016 IVR-186 (H3N2) ANTIGEN (FORMALDEHYDE INACTIVATED), INFLUENZA B VIRUS B/PHUKET/3073/2013 ANTIGEN (FORMALDEHYDE INACTIVATED), AND INFLUENZA B VIRUS B/MARYLAND/15/2016 BX-69A ANTIGEN (FORMALDEHYDE INACTIVATED) 0.5 ML: 15; 15; 15; 15 INJECTION, SUSPENSION INTRAMUSCULAR at 13:31

## 2018-12-03 RX ADMIN — SUCRALFATE 1 G: 1 TABLET ORAL at 09:25

## 2018-12-03 RX ADMIN — ASPIRIN 81 MG: 81 TABLET, CHEWABLE ORAL at 09:17

## 2018-12-03 RX ADMIN — METOPROLOL TARTRATE 6.25 MG: 25 TABLET ORAL at 09:17

## 2018-12-03 RX ADMIN — PNEUMOCOCCAL 13-VALENT CONJUGATE VACCINE 0.5 ML: 2.2; 2.2; 2.2; 2.2; 2.2; 4.4; 2.2; 2.2; 2.2; 2.2; 2.2; 2.2; 2.2 INJECTION, SUSPENSION INTRAMUSCULAR at 13:32

## 2018-12-03 RX ADMIN — ALPRAZOLAM 0.5 MG: 0.25 TABLET ORAL at 13:30

## 2018-12-03 RX ADMIN — ISOSORBIDE MONONITRATE 30 MG: 30 TABLET ORAL at 09:17

## 2018-12-03 RX ADMIN — ATORVASTATIN CALCIUM 20 MG: 20 TABLET, FILM COATED ORAL at 09:24

## 2018-12-03 RX ADMIN — ENOXAPARIN SODIUM 40 MG: 40 INJECTION SUBCUTANEOUS at 09:25

## 2018-12-03 ASSESSMENT — ENCOUNTER SYMPTOMS
SHORTNESS OF BREATH: 0
ABDOMINAL PAIN: 0
CONSTIPATION: 0
WHEEZING: 0
VOMITING: 0
DIARRHEA: 0
NAUSEA: 0

## 2018-12-03 ASSESSMENT — PAIN SCALES - GENERAL: PAINLEVEL_OUTOF10: 6

## 2018-12-03 NOTE — DISCHARGE SUMMARY
GFR Non-African American >60 >60 mL/min    GFR African American >60 >60 mL/min    GFR Comment          GFR Staging NOT REPORTED    Magnesium    Collection Time: 12/02/18  4:57 AM   Result Value Ref Range    Magnesium 2.0 1.6 - 2.6 mg/dL   Lipid panel - fasting    Collection Time: 12/02/18  4:57 AM   Result Value Ref Range    Cholesterol 129 <200 mg/dL    HDL 39 (L) >40 mg/dL    LDL Cholesterol 73 0 - 130 mg/dL    Chol/HDL Ratio 3.3 <5    Triglycerides 83 <150 mg/dL    VLDL NOT REPORTED 1 - 30 mg/dL   CBC    Collection Time: 12/02/18  4:57 AM   Result Value Ref Range    WBC 10.5 3.5 - 11.3 k/uL    RBC 4.28 4.21 - 5.77 m/uL    Hemoglobin 13.1 13.0 - 17.0 g/dL    Hematocrit 39.5 (L) 40.7 - 50.3 %    MCV 92.3 82.6 - 102.9 fL    MCH 30.6 25.2 - 33.5 pg    MCHC 33.2 28.4 - 34.8 g/dL    RDW 14.3 11.8 - 14.4 %    Platelets 985 546 - 221 k/uL    MPV 9.6 8.1 - 13.5 fL    NRBC Automated 0.0 0.0 per 100 WBC   Troponin    Collection Time: 12/02/18  4:57 AM   Result Value Ref Range    Troponin T <0.03 <0.03 ng/mL    Troponin Interp         Amylase    Collection Time: 12/02/18  4:57 AM   Result Value Ref Range    Amylase 35 28 - 100 U/L   Lipase    Collection Time: 12/02/18  4:57 AM   Result Value Ref Range    Lipase 10 (L) 13 - 60 U/L   EKG 12 lead    Collection Time: 12/02/18  5:56 AM   Result Value Ref Range    Ventricular Rate 53 BPM    Atrial Rate 53 BPM    P-R Interval 162 ms    QRS Duration 82 ms    Q-T Interval 472 ms    QTc Calculation (Bazett) 442 ms    P Axis 59 degrees    R Axis 74 degrees    T Axis 75 degrees       Radiology:  Chest Standard (2 Vw)  Result Date: 12/1/2018  No acute process. Cta Chest W Wo Contrast  Result Date: 12/1/2018  No evidence of dissection. Coronary artery disease. New right upper lobe pulmonary nodule. Stable right lower lobe pulmonary nodule. Multiple smaller stable nodules. No further follow-up required as below.        Consultations:    Consults:     Final Specialist Recommendations/Findings:   IP CONSULT TO INTERNAL MEDICINE  IP CONSULT TO HOSPITALIST  IP CONSULT TO CARDIOLOGY  IP CONSULT TO ONCOLOGY      The patient was seen and examined on day of discharge and this discharge summary is in conjunction with any daily progress note from day of discharge. Discharge plan:     Disposition: Home    Physician Follow Up:   [address]  [address]  [phone]    Schedule an appointment as soon as possible for a visit in 1 week  Post Hospitalization FU    [address], [place]  [address] [place] [address]  [address] [address] [phone]    Schedule an appointment as soon as possible for a visit in 1 week  Post Hospitalization [address]  [phone]    Schedule an appointment as soon as possible for a visit in 1 week  Post Hospitalization FU       Requiring Further Evaluation/Follow Up POST HOSPITALIZATION/Incidental Findings: Follow-up as above. Diet: cardiac diet    DIET CARDIAC; Activity: As tolerated    Discharge Medications:   Current Discharge Medication List      START taking these medications    Details   calcium carbonate (TUMS) 500 MG chewable tablet Take 1 tablet by mouth 3 times daily as needed for Heartburn  Qty: 90 tablet, Refills: 0      isosorbide mononitrate (IMDUR) 30 MG extended release tablet Take 1 tablet by mouth daily  Qty: 30 tablet, Refills: 3      nitroGLYCERIN (NITROSTAT) 0.4 MG SL tablet up to max of 3 total doses. If no relief after 1 dose, call 911.   Qty: 25 tablet, Refills: 3             Current Discharge Medication List          Current Discharge Medication List      CONTINUE these medications which have NOT CHANGED    Details   ondansetron (ZOFRAN) 4 MG tablet Take 1 tablet by mouth every 8 hours as needed for Nausea  Qty: 60 tablet, Refills: 1      lactulose (CHRONULAC) 10 GM/15ML solution Take 30 mLs by mouth 3 times daily as needed (constipation)  Qty: 236 Although every effort was made to ensure the accuracy of this automated transcription, some errors in transcription may have occurred.)

## 2018-12-03 NOTE — PROGRESS NOTES
tartrate  6.25 mg Oral BID    pantoprazole  40 mg Oral BID AC    sucralfate  1 g Oral 4 times per day    sodium chloride flush  10 mL Intravenous 2 times per day    enoxaparin  40 mg Subcutaneous Daily    nicotine  1 patch Transdermal Daily       IV Infusions (if any):      Diagnostics:   Telemetry: Mild sinus bradycardia  ECHO: 9/2018      Stress test 5/2018:    EKG 12/1/2018:    12/2/2018:    Labs:   CBC:  Recent Labs      12/01/18   1856  12/02/18 0457   WBC  13.1*  10.5   HGB  13.8  13.1   HCT  41.9  39.5*   PLT  322  267     Magnesium:  Recent Labs      12/02/18 0457   MG  2.0     BMP:  Recent Labs      12/01/18   1856  12/02/18 0457   NA  136  136   K  5.1  3.9   CALCIUM  9.1  9.0   CO2  28  25   BUN  19  16   CREATININE  1.22*  1.15   LABGLOM  58*  >60   GLUCOSE  113*  92     BNP:No results for input(s): BNP in the last 72 hours. PT/INR:No results for input(s): PROTIME, INR in the last 72 hours. APTT:No results for input(s): APTT in the last 72 hours. CARDIAC ENZYMES:  Recent Labs      12/02/18   0036  12/02/18 0457   TROPONINT  <0.03  <0.03     FASTING LIPID PANEL:  Lab Results   Component Value Date    HDL 39 12/02/2018    TRIG 83 12/02/2018     LIVER PROFILE:  Recent Labs      12/02/18 0457   AST  9   ALT  7   LABALBU  3.5   ALKPHOS  59   BILITOT  0.53   PROT  5.8*   ALBUMIN  1.5        ASSESSMENT:  1. Atypical back/chest pain without signs of AMI, no recurrence since starting Isosorbide - preserved LV systolic function on echo done 9/2018 and no ischemia on stress test 5/2018 with EF 53%  2. CAD s/p CABG x3 2014  3. Gastric cancer, managed by others  4. Dyslipidemia - treated  5. PVD, managed by others    PLAN:  1. Continue current cardiac medications. 2. Conservative cardiac management.   No recurrence of his back/chest pain with antianginal treatment and patients symptoms are atypical.  I discussed with the patient options of treatment of invasive vs. Medical management and he agrees

## 2018-12-03 NOTE — PROGRESS NOTES
History:  reports that he quit smoking about 7 months ago. His smoking use included Cigarettes. He has a 7.50 pack-year smoking history. He has quit using smokeless tobacco. He reports that he does not drink alcohol or use drugs. Family History:   Family History   Problem Relation Age of Onset    Lung Cancer Father     Stroke Maternal Grandmother        Vitals:  Patient Vitals for the past 24 hrs:   BP Temp Temp src Pulse Resp SpO2   18 0610 (!) 107/50 98 °F (36.7 °C) Oral (!) 46 16 96 %   18 2000 (!) 99/49 98.3 °F (36.8 °C) Oral 53 18 -   18 0910 - - - - - 99 %     Temp (24hrs), Av.2 °F (36.8 °C), Min:98 °F (36.7 °C), Max:98.3 °F (36.8 °C)    No results for input(s): POCGLU in the last 72 hours. I/O (24Hr): Intake/Output Summary (Last 24 hours) at 18 0818  Last data filed at 18 0408   Gross per 24 hour   Intake              560 ml   Output              500 ml   Net               60 ml       Labs:  No results found for this or any previous visit (from the past 24 hour(s)). Lab Results   Component Value Date/Time    SPECIAL NOT REPORTED 2015 10:28 AM     No results found for: CULTURE    Radiology:  Xr Chest Standard (2 Vw)  Result Date: 2018  No acute process. Cta Chest W Wo Contrast  Result Date: 2018  No evidence of dissection. Coronary artery disease. New right upper lobe pulmonary nodule. Stable right lower lobe pulmonary nodule. Multiple smaller stable nodules. No further follow-up required as below. Physical Examination:      Physical Exam   Constitutional: He is oriented to person, place, and time. He appears well-developed and well-nourished. HENT:   Head: Normocephalic and atraumatic. Right Ear: External ear normal.   Left Ear: External ear normal.   Eyes: Conjunctivae are normal. Right eye exhibits no discharge. Left eye exhibits no discharge. No scleral icterus. Neck: Neck supple. No erythema present.    Cardiovascular: Normal rate, regular rhythm and normal heart sounds. No murmur heard. Pulmonary/Chest: Effort normal and breath sounds normal. He has no wheezes. Abdominal: Soft. Bowel sounds are normal. He exhibits no mass. There is no tenderness. There is no rebound and no guarding. Musculoskeletal: He exhibits no edema or tenderness. Neurological: He is alert and oriented to person, place, and time. There are no new focal motor or sensory deficits, normal muscle tone and bulk, no abnormal sensation, normal speech. Skin: Skin is warm. No rash noted. Psychiatric: His behavior is normal. His mood appears anxious. Nursing note and vitals reviewed. Assessment:        Principal Problem:    Chest pain due to GERD  Active Problems:    S/P CABG (coronary artery bypass graft)    Ex-smoker    GERD (gastroesophageal reflux disease)    MALT (mucosa associated lymphoid tissue) (HCC)    Esophagitis    Tracy's esophagus  Resolved Problems:    Constipation      Plan:          1. Chest pain, likely due to GERD improved with Tums. Continue PPI. Follow up with GI for Tracy esophagus  2. Negative troponins, unremarkable stress test reports from office plan to follow-up as outpatient with cardiology in office. Treated with Imdur 30 mg daily. Symptoms improved. 3. MALT follow-up with oncology as outpatient  4. Right lower lobe nodule. Stable, follow-up by oncology  5. HTN: stable,resume home meds  6. Check Electrolytes and Electrolytes replacement as needed   7. PT, OT as needed. IV Fluids: ,    Nutrition:  DIET CARDIAC;      Consultations:   IP CONSULT TO INTERNAL MEDICINE  IP CONSULT TO HOSPITALIST  IP CONSULT TO CARDIOLOGY  IP CONSULT TO ONCOLOGY      Discussed care plan with nurse after getting input from the nurse.     Above plan discussed with the patient in room, who agreed to the above plan     Please call if any questions    Caren Dillon MD  [facility] Hospitalist  12/3/2018  8:18 AM     (Please

## 2018-12-03 NOTE — CONSULTS
[address]                  [facility]                                  CONSULTATION    PATIENT NAME: Barb Reynolds                       :        1945  MED REC NO:   [id number]                             ROOM:         ACCOUNT NO:   [de-identified]                           ADMIT DATE: 2018  PROVIDER:     Caden Krishnamurthy    CONSULTATION CONSULT    CONSULT DATE:  2018    REASON FOR CONSULTATION:  Chest pain. CHIEF COMPLAINT:  Chest pain. HISTORY OF PRESENT ILLNESS:  The patient is a 79-year-old with history  of coronary artery disease and prior coronary artery bypass graft  surgery, who was admitted to the hospital with symptoms of midsternal  chest discomfort radiating to the back, on and off, for the last 2 days. He reported that prior symptoms prior to his myocardial infarction were  similar, hence he was anxious and presented to the hospital for further  evaluation. He was admitted, and Cardiology Service was consulted. He usually sees Dr. Paola Atkins at [facility], cardiology office. The  patient also has history of gastric cancer and follows with Dr. Alyssa Castillo. PAST MEDICAL HISTORY:  1. Coronary artery disease with prior coronary artery bypass graft  surgery and percutaneous coronary intervention. 2.  Hypertension. 3.  Dyslipidemia. 4.  Myocardial infarction. 5.  Peripheral vascular disease. 6.  Tracy's esophagus. 7.  Colonic polyposis. PAST SURGICAL HISTORY:  Positive for:  1. Coronary artery bypass graft surgery. 2.  Carotid endarterectomy. 3.  Cervical diskectomy. 4.  Arthroplasty. 5.  Tonsillectomy. 6.  Endoscopy. ALLERGIES:  VANCOMYCIN. SOCIAL HISTORY:  Negative for current smoking or drinking excess  alcohol. He states that he occasionally smokes cigars. FAMILY HISTORY:  Reviewed and is noncontributory. REVIEW OF SYSTEMS:  15-point system review was performed.

## 2018-12-12 ENCOUNTER — TELEPHONE (OUTPATIENT)
Dept: GASTROENTEROLOGY | Age: 73
End: 2018-12-12

## 2018-12-17 ENCOUNTER — HOSPITAL ENCOUNTER (OUTPATIENT)
Facility: MEDICAL CENTER | Age: 73
Discharge: HOME | End: 2018-12-17
Payer: MEDICARE

## 2018-12-17 ENCOUNTER — TELEPHONE (OUTPATIENT)
Dept: ONCOLOGY | Age: 73
End: 2018-12-17

## 2018-12-17 ENCOUNTER — OFFICE VISIT (OUTPATIENT)
Dept: ONCOLOGY | Age: 73
End: 2018-12-17
Payer: MEDICARE

## 2018-12-17 VITALS
HEART RATE: 60 BPM | TEMPERATURE: 98.1 F | DIASTOLIC BLOOD PRESSURE: 83 MMHG | WEIGHT: 151.4 LBS | BODY MASS INDEX: 25.19 KG/M2 | SYSTOLIC BLOOD PRESSURE: 138 MMHG

## 2018-12-17 DIAGNOSIS — K21.9 CHEST PAIN DUE TO GERD: ICD-10-CM

## 2018-12-17 DIAGNOSIS — R07.9 CHEST PAIN DUE TO GERD: ICD-10-CM

## 2018-12-17 DIAGNOSIS — C88.4 MALT (MUCOSA ASSOCIATED LYMPHOID TISSUE) (HCC): Primary | ICD-10-CM

## 2018-12-17 DIAGNOSIS — C88.4 MALT (MUCOSA ASSOCIATED LYMPHOID TISSUE) (HCC): ICD-10-CM

## 2018-12-17 DIAGNOSIS — I65.22 STENOSIS OF LEFT CAROTID ARTERY: ICD-10-CM

## 2018-12-17 DIAGNOSIS — K29.70 GASTRITIS WITHOUT BLEEDING, UNSPECIFIED CHRONICITY, UNSPECIFIED GASTRITIS TYPE: ICD-10-CM

## 2018-12-17 LAB
ABSOLUTE EOS #: 0.4 K/UL (ref 0–0.4)
ABSOLUTE IMMATURE GRANULOCYTE: ABNORMAL K/UL (ref 0–0.3)
ABSOLUTE LYMPH #: 3.1 K/UL (ref 1–4.8)
ABSOLUTE MONO #: 0.9 K/UL (ref 0.2–0.8)
ALBUMIN SERPL-MCNC: 4.1 G/DL (ref 3.5–5.2)
ALBUMIN/GLOBULIN RATIO: ABNORMAL (ref 1–2.5)
ALP BLD-CCNC: 58 U/L (ref 40–129)
ALT SERPL-CCNC: 7 U/L (ref 5–41)
ANION GAP SERPL CALCULATED.3IONS-SCNC: 11 MMOL/L (ref 9–17)
AST SERPL-CCNC: 10 U/L
BASOPHILS # BLD: 1 % (ref 0–2)
BASOPHILS ABSOLUTE: 0.1 K/UL (ref 0–0.2)
BILIRUB SERPL-MCNC: 0.38 MG/DL (ref 0.3–1.2)
BUN BLDV-MCNC: 14 MG/DL (ref 8–23)
BUN/CREAT BLD: 12 (ref 9–20)
CALCIUM SERPL-MCNC: 9 MG/DL (ref 8.6–10.4)
CHLORIDE BLD-SCNC: 99 MMOL/L (ref 98–107)
CO2: 28 MMOL/L (ref 20–31)
CREAT SERPL-MCNC: 1.17 MG/DL (ref 0.7–1.2)
DIFFERENTIAL TYPE: ABNORMAL
EOSINOPHILS RELATIVE PERCENT: 3 % (ref 1–4)
GFR AFRICAN AMERICAN: >60 ML/MIN
GFR NON-AFRICAN AMERICAN: >60 ML/MIN
GFR SERPL CREATININE-BSD FRML MDRD: ABNORMAL ML/MIN/{1.73_M2}
GFR SERPL CREATININE-BSD FRML MDRD: ABNORMAL ML/MIN/{1.73_M2}
GLUCOSE BLD-MCNC: 106 MG/DL (ref 70–99)
HCT VFR BLD CALC: 39.1 % (ref 41–53)
HEMOGLOBIN: 13.4 G/DL (ref 13.5–17.5)
IMMATURE GRANULOCYTES: ABNORMAL %
LACTATE DEHYDROGENASE: 169 U/L (ref 135–225)
LYMPHOCYTES # BLD: 28 % (ref 24–44)
MCH RBC QN AUTO: 31.7 PG (ref 26–34)
MCHC RBC AUTO-ENTMCNC: 34.3 G/DL (ref 31–37)
MCV RBC AUTO: 92.4 FL (ref 80–100)
MONOCYTES # BLD: 8 % (ref 1–7)
NRBC AUTOMATED: ABNORMAL PER 100 WBC
PDW BLD-RTO: 15.2 % (ref 11.5–14.5)
PLATELET # BLD: 327 K/UL (ref 130–400)
PLATELET ESTIMATE: ABNORMAL
PMV BLD AUTO: 6.9 FL (ref 6–12)
POTASSIUM SERPL-SCNC: 4.5 MMOL/L (ref 3.7–5.3)
RBC # BLD: 4.22 M/UL (ref 4.5–5.9)
RBC # BLD: ABNORMAL 10*6/UL
SEG NEUTROPHILS: 60 % (ref 36–66)
SEGMENTED NEUTROPHILS ABSOLUTE COUNT: 6.6 K/UL (ref 1.8–7.7)
SODIUM BLD-SCNC: 138 MMOL/L (ref 135–144)
TOTAL PROTEIN: 6.5 G/DL (ref 6.4–8.3)
WBC # BLD: 11.1 K/UL (ref 3.5–11)
WBC # BLD: ABNORMAL 10*3/UL

## 2018-12-17 PROCEDURE — G8427 DOCREV CUR MEDS BY ELIG CLIN: HCPCS | Performed by: INTERNAL MEDICINE

## 2018-12-17 PROCEDURE — G8419 CALC BMI OUT NRM PARAM NOF/U: HCPCS | Performed by: INTERNAL MEDICINE

## 2018-12-17 PROCEDURE — 83615 LACTATE (LD) (LDH) ENZYME: CPT

## 2018-12-17 PROCEDURE — 1123F ACP DISCUSS/DSCN MKR DOCD: CPT | Performed by: INTERNAL MEDICINE

## 2018-12-17 PROCEDURE — 99211 OFF/OP EST MAY X REQ PHY/QHP: CPT

## 2018-12-17 PROCEDURE — 1101F PT FALLS ASSESS-DOCD LE1/YR: CPT | Performed by: INTERNAL MEDICINE

## 2018-12-17 PROCEDURE — 80053 COMPREHEN METABOLIC PANEL: CPT

## 2018-12-17 PROCEDURE — 85025 COMPLETE CBC W/AUTO DIFF WBC: CPT

## 2018-12-17 PROCEDURE — G8598 ASA/ANTIPLAT THER USED: HCPCS | Performed by: INTERNAL MEDICINE

## 2018-12-17 PROCEDURE — 3017F COLORECTAL CA SCREEN DOC REV: CPT | Performed by: INTERNAL MEDICINE

## 2018-12-17 PROCEDURE — G8482 FLU IMMUNIZE ORDER/ADMIN: HCPCS | Performed by: INTERNAL MEDICINE

## 2018-12-17 PROCEDURE — 1036F TOBACCO NON-USER: CPT | Performed by: INTERNAL MEDICINE

## 2018-12-17 PROCEDURE — 99214 OFFICE O/P EST MOD 30 MIN: CPT | Performed by: INTERNAL MEDICINE

## 2018-12-17 PROCEDURE — 36415 COLL VENOUS BLD VENIPUNCTURE: CPT

## 2018-12-17 PROCEDURE — 4040F PNEUMOC VAC/ADMIN/RCVD: CPT | Performed by: INTERNAL MEDICINE

## 2018-12-17 PROCEDURE — 1111F DSCHRG MED/CURRENT MED MERGE: CPT | Performed by: INTERNAL MEDICINE

## 2018-12-17 RX ORDER — ONDANSETRON 4 MG/1
4 TABLET, FILM COATED ORAL EVERY 8 HOURS PRN
Qty: 60 TABLET | Refills: 1 | Status: SHIPPED | OUTPATIENT
Start: 2018-12-17 | End: 2019-03-19 | Stop reason: SDUPTHER

## 2018-12-17 NOTE — LETTER
Respiratory: negative for cough , sputum, dyspnea, wheezing, hemoptysis, chest pain   Cardiovascular: negative for chest pain, dyspnea, palpitations, orthopnea, PND   Gastrointestinal: Chronic nausea, no major abdomen pain, no melena,  Genitourinary: negative for frequency, dysuria, nocturia, urinary incontinence, and hematuria   Integument: negative for rash, skin lesions, bruises.    Hematologic/Lymphatic: negative for easy bruising, bleeding, lymphadenopathy, petechiae and swelling/edema   Endocrine: negative for heat or cold intolerance, tremor, weight changes, change in bowel habits and hair loss   Musculoskeletal: negative for myalgias, arthralgias, pain, joint swelling,and bone pain   Neurological: negative for headaches, dizziness, seizures, weakness, numbness      Physical Examination :       /83   Pulse 60   Temp 98.1 °F (36.7 °C) (Oral)   Wt 151 lb 6.4 oz (68.7 kg)   BMI 25.19 kg/m²      Performance Status:Estimated performance status is ECOG 1    General appearance - looks very frail, he is not in distress  Mental status - alert and cooperative   Neck - supple, no significant adenopathy ,trach is central; carotid murmer both sides  Lymphatics - no palpable lymphadenopathy, no hepatosplenomegaly   Chest - clear to auscultation, no wheezes, rales or rhonchi, symmetric air entry   Heart - normal rate, regular rhythm, normal S1, S2, no murmurs, rubs, clicks or gallops   Abdomen - soft, nontender, nondistended, no masses or organomegaly   Neurological - alert, oriented, normal speech, no focal findings or movement disorder noted   Musculoskeletal - no joint tenderness, deformity or swelling   Extremities - peripheral pulses normal, no pedal edema, no clubbing or cyanosis   Skin - normal coloration and turgor, no rashes, no suspicious skin lesions noted    REVIEW OF LABORATORY DATA:    Lab Results   Component Value Date    WBC 11.1 12/17/2018    HGB 13.4 12/17/2018    HCT 39.1 12/17/2018

## 2018-12-17 NOTE — PROGRESS NOTES
a past medical history of Arthritis; Tracy's esophagus; Brunner's gland adenoma; CAD (coronary artery disease); Colon polyp; Dysphagia; Esophagitis; Gastritis; GERD (gastroesophageal reflux disease); Hyperlipidemia; Hypertension; Lipoma; Myocardial infarction ([facility] [address] Nausea & vomiting; Pyogenic granuloma; and Vascular abnormality. Surgical History   has a past surgical history that includes Cardiac catheterization; Carotid endarterectomy; Cervical discectomy; Lumbar disc surgery; arthroplasty; arthroplasty; Tonsillectomy; Inguinal hernia repair; Brain tumor excision (2008); Cervical spine surgery (6-25-13); Coronary artery bypass graft (3/6/2014); Upper gastrointestinal endoscopy (4/30/2015); Upper gastrointestinal endoscopy (5/18/16); Upper gastrointestinal endoscopy (01/25/2017); joint replacement; other surgical history (Left, 12/12/2017); Upper gastrointestinal endoscopy (08/08/2018); Upper gastrointestinal endoscopy (8/8/2018); Colonoscopy (11/21/2018); and Colonoscopy (N/A, 11/21/2018). Home Medications  has a current medication list which includes the following prescription(s): calcium carbonate, isosorbide mononitrate, nitroglycerin, ondansetron, omeprazole, metoprolol tartrate, hydrocodone-acetaminophen, vitamin c, clopidogrel, alprazolam, atorvastatin, docusate, fentanyl, aspirin, lactulose, linaclotide, sucralfate, and albuterol sulfate hfa. Allergies:  Vancomycin      Review of Systems :      Constitutional: Moderate fatigue, nausea   HEENT: negative for sore mouth, sore throat, hoarseness and voice change; blind in left eye - as noted.   Recurrent headaches  Respiratory: negative for cough , sputum, dyspnea, wheezing, hemoptysis, chest pain   Cardiovascular: negative for chest pain, dyspnea, palpitations, orthopnea, PND   Gastrointestinal: Chronic nausea, no major abdomen pain, no melena,  Genitourinary: negative for frequency, dysuria, nocturia, urinary incontinence, and hematuria

## 2019-02-20 ENCOUNTER — OFFICE VISIT (OUTPATIENT)
Dept: GASTROENTEROLOGY | Age: 74
End: 2019-02-20
Payer: MEDICARE

## 2019-02-20 VITALS
HEART RATE: 68 BPM | SYSTOLIC BLOOD PRESSURE: 164 MMHG | BODY MASS INDEX: 25.46 KG/M2 | WEIGHT: 153 LBS | DIASTOLIC BLOOD PRESSURE: 79 MMHG

## 2019-02-20 DIAGNOSIS — C88.4 MALT (MUCOSA ASSOCIATED LYMPHOID TISSUE) (HCC): ICD-10-CM

## 2019-02-20 DIAGNOSIS — K22.70 BARRETT'S ESOPHAGUS WITHOUT DYSPLASIA: Primary | ICD-10-CM

## 2019-02-20 DIAGNOSIS — R11.0 NAUSEA: ICD-10-CM

## 2019-02-20 PROCEDURE — G8482 FLU IMMUNIZE ORDER/ADMIN: HCPCS | Performed by: INTERNAL MEDICINE

## 2019-02-20 PROCEDURE — G8419 CALC BMI OUT NRM PARAM NOF/U: HCPCS | Performed by: INTERNAL MEDICINE

## 2019-02-20 PROCEDURE — 1036F TOBACCO NON-USER: CPT | Performed by: INTERNAL MEDICINE

## 2019-02-20 PROCEDURE — G8427 DOCREV CUR MEDS BY ELIG CLIN: HCPCS | Performed by: INTERNAL MEDICINE

## 2019-02-20 PROCEDURE — 4040F PNEUMOC VAC/ADMIN/RCVD: CPT | Performed by: INTERNAL MEDICINE

## 2019-02-20 PROCEDURE — G8599 NO ASA/ANTIPLAT THER USE RNG: HCPCS | Performed by: INTERNAL MEDICINE

## 2019-02-20 PROCEDURE — 1101F PT FALLS ASSESS-DOCD LE1/YR: CPT | Performed by: INTERNAL MEDICINE

## 2019-02-20 PROCEDURE — 99214 OFFICE O/P EST MOD 30 MIN: CPT | Performed by: INTERNAL MEDICINE

## 2019-02-20 PROCEDURE — 3017F COLORECTAL CA SCREEN DOC REV: CPT | Performed by: INTERNAL MEDICINE

## 2019-02-20 PROCEDURE — 1123F ACP DISCUSS/DSCN MKR DOCD: CPT | Performed by: INTERNAL MEDICINE

## 2019-02-20 ASSESSMENT — ENCOUNTER SYMPTOMS
BLOOD IN STOOL: 0
RESPIRATORY NEGATIVE: 1
CONSTIPATION: 1
ABDOMINAL PAIN: 0
ANAL BLEEDING: 0
BACK PAIN: 1
DIARRHEA: 0
ABDOMINAL DISTENTION: 0
VOMITING: 0
NAUSEA: 0
RECTAL PAIN: 0
TROUBLE SWALLOWING: 0
ALLERGIC/IMMUNOLOGIC NEGATIVE: 1

## 2019-03-19 DIAGNOSIS — C88.4 MALT (MUCOSA ASSOCIATED LYMPHOID TISSUE) (HCC): Primary | ICD-10-CM

## 2019-03-20 RX ORDER — ONDANSETRON 4 MG/1
4 TABLET, FILM COATED ORAL EVERY 8 HOURS PRN
Qty: 60 TABLET | Refills: 1 | Status: SHIPPED | OUTPATIENT
Start: 2019-03-20 | End: 2019-04-19

## 2019-04-04 ENCOUNTER — TELEPHONE (OUTPATIENT)
Dept: ONCOLOGY | Age: 74
End: 2019-04-04

## 2019-05-29 DIAGNOSIS — C88.4 MALT (MUCOSA ASSOCIATED LYMPHOID TISSUE) (HCC): Primary | ICD-10-CM

## 2019-06-03 ENCOUNTER — HOSPITAL ENCOUNTER (OUTPATIENT)
Age: 74
Discharge: HOME | End: 2019-06-03
Payer: MEDICARE

## 2019-06-03 ENCOUNTER — HOSPITAL ENCOUNTER (OUTPATIENT)
Facility: MEDICAL CENTER | Age: 74
Discharge: HOME | End: 2019-06-03
Payer: MEDICARE

## 2019-06-03 DIAGNOSIS — C88.4 MALT (MUCOSA ASSOCIATED LYMPHOID TISSUE) (HCC): ICD-10-CM

## 2019-06-03 LAB
ABSOLUTE EOS #: 0.28 K/UL (ref 0–0.44)
ABSOLUTE IMMATURE GRANULOCYTE: 0.06 K/UL (ref 0–0.3)
ABSOLUTE LYMPH #: 2.82 K/UL (ref 1.1–3.7)
ABSOLUTE MONO #: 1.12 K/UL (ref 0.1–1.2)
ALBUMIN SERPL-MCNC: 4.2 G/DL (ref 3.5–5.2)
ALBUMIN/GLOBULIN RATIO: ABNORMAL (ref 1–2.5)
ALP BLD-CCNC: 58 U/L (ref 40–129)
ALT SERPL-CCNC: 11 U/L (ref 5–41)
ANION GAP SERPL CALCULATED.3IONS-SCNC: 12 MMOL/L (ref 9–17)
AST SERPL-CCNC: 10 U/L
BASOPHILS # BLD: 1 % (ref 0–2)
BASOPHILS ABSOLUTE: 0.12 K/UL (ref 0–0.2)
BILIRUB SERPL-MCNC: 0.35 MG/DL (ref 0.3–1.2)
BUN BLDV-MCNC: 28 MG/DL (ref 8–23)
BUN/CREAT BLD: 17 (ref 9–20)
CALCIUM SERPL-MCNC: 9.3 MG/DL (ref 8.6–10.4)
CHLORIDE BLD-SCNC: 102 MMOL/L (ref 98–107)
CO2: 25 MMOL/L (ref 20–31)
CREAT SERPL-MCNC: 1.61 MG/DL (ref 0.7–1.2)
DIFFERENTIAL TYPE: ABNORMAL
EOSINOPHILS RELATIVE PERCENT: 2 % (ref 1–4)
GFR AFRICAN AMERICAN: 51 ML/MIN
GFR NON-AFRICAN AMERICAN: 42 ML/MIN
GFR SERPL CREATININE-BSD FRML MDRD: ABNORMAL ML/MIN/{1.73_M2}
GFR SERPL CREATININE-BSD FRML MDRD: ABNORMAL ML/MIN/{1.73_M2}
GLUCOSE BLD-MCNC: 135 MG/DL (ref 70–99)
HCT VFR BLD CALC: 40.6 % (ref 40.7–50.3)
HEMOGLOBIN: 13.3 G/DL (ref 13–17)
IMMATURE GRANULOCYTES: 1 %
LACTATE DEHYDROGENASE: 197 U/L (ref 135–225)
LYMPHOCYTES # BLD: 23 % (ref 24–43)
MCH RBC QN AUTO: 30.6 PG (ref 25.2–33.5)
MCHC RBC AUTO-ENTMCNC: 32.8 G/DL (ref 28.4–34.8)
MCV RBC AUTO: 93.3 FL (ref 82.6–102.9)
MONOCYTES # BLD: 9 % (ref 3–12)
NRBC AUTOMATED: 0 PER 100 WBC
PDW BLD-RTO: 14.7 % (ref 11.8–14.4)
PLATELET # BLD: 256 K/UL (ref 138–453)
PLATELET ESTIMATE: ABNORMAL
PMV BLD AUTO: 10.1 FL (ref 8.1–13.5)
POTASSIUM SERPL-SCNC: 4.3 MMOL/L (ref 3.7–5.3)
RBC # BLD: 4.35 M/UL (ref 4.21–5.77)
RBC # BLD: ABNORMAL 10*6/UL
SEG NEUTROPHILS: 64 % (ref 36–65)
SEGMENTED NEUTROPHILS ABSOLUTE COUNT: 7.83 K/UL (ref 1.5–8.1)
SODIUM BLD-SCNC: 139 MMOL/L (ref 135–144)
TOTAL PROTEIN: 6.6 G/DL (ref 6.4–8.3)
WBC # BLD: 12.2 K/UL (ref 3.5–11.3)
WBC # BLD: ABNORMAL 10*3/UL

## 2019-06-03 PROCEDURE — 83615 LACTATE (LD) (LDH) ENZYME: CPT

## 2019-06-03 PROCEDURE — 85025 COMPLETE CBC W/AUTO DIFF WBC: CPT

## 2019-06-03 PROCEDURE — 36415 COLL VENOUS BLD VENIPUNCTURE: CPT

## 2019-06-03 PROCEDURE — 80053 COMPREHEN METABOLIC PANEL: CPT

## 2019-06-07 ENCOUNTER — HOSPITAL ENCOUNTER (OUTPATIENT)
Facility: MEDICAL CENTER | Age: 74
End: 2019-06-07
Payer: MEDICARE

## 2019-06-10 ENCOUNTER — TELEPHONE (OUTPATIENT)
Dept: ONCOLOGY | Age: 74
End: 2019-06-10

## 2019-06-10 ENCOUNTER — OFFICE VISIT (OUTPATIENT)
Dept: ONCOLOGY | Age: 74
End: 2019-06-10
Payer: MEDICARE

## 2019-06-10 VITALS
DIASTOLIC BLOOD PRESSURE: 66 MMHG | OXYGEN SATURATION: 95 % | HEART RATE: 45 BPM | BODY MASS INDEX: 25.13 KG/M2 | RESPIRATION RATE: 15 BRPM | WEIGHT: 151 LBS | SYSTOLIC BLOOD PRESSURE: 135 MMHG | TEMPERATURE: 97.8 F

## 2019-06-10 DIAGNOSIS — C88.4 MALT (MUCOSA ASSOCIATED LYMPHOID TISSUE) (HCC): Primary | ICD-10-CM

## 2019-06-10 DIAGNOSIS — K21.9 CHEST PAIN DUE TO GERD: ICD-10-CM

## 2019-06-10 DIAGNOSIS — R07.9 CHEST PAIN DUE TO GERD: ICD-10-CM

## 2019-06-10 PROCEDURE — 4040F PNEUMOC VAC/ADMIN/RCVD: CPT | Performed by: INTERNAL MEDICINE

## 2019-06-10 PROCEDURE — 1036F TOBACCO NON-USER: CPT | Performed by: INTERNAL MEDICINE

## 2019-06-10 PROCEDURE — G8427 DOCREV CUR MEDS BY ELIG CLIN: HCPCS | Performed by: INTERNAL MEDICINE

## 2019-06-10 PROCEDURE — G8599 NO ASA/ANTIPLAT THER USE RNG: HCPCS | Performed by: INTERNAL MEDICINE

## 2019-06-10 PROCEDURE — 99214 OFFICE O/P EST MOD 30 MIN: CPT | Performed by: INTERNAL MEDICINE

## 2019-06-10 PROCEDURE — G8419 CALC BMI OUT NRM PARAM NOF/U: HCPCS | Performed by: INTERNAL MEDICINE

## 2019-06-10 PROCEDURE — 1123F ACP DISCUSS/DSCN MKR DOCD: CPT | Performed by: INTERNAL MEDICINE

## 2019-06-10 PROCEDURE — 3017F COLORECTAL CA SCREEN DOC REV: CPT | Performed by: INTERNAL MEDICINE

## 2019-06-10 PROCEDURE — 99212 OFFICE O/P EST SF 10 MIN: CPT | Performed by: INTERNAL MEDICINE

## 2019-06-10 RX ORDER — ONDANSETRON 4 MG/1
4 TABLET, ORALLY DISINTEGRATING ORAL EVERY 8 HOURS PRN
Qty: 30 TABLET | Refills: 0 | Status: SHIPPED | OUTPATIENT
Start: 2019-06-10 | End: 2019-06-10 | Stop reason: SDUPTHER

## 2019-06-10 RX ORDER — ONDANSETRON 4 MG/1
4 TABLET, ORALLY DISINTEGRATING ORAL EVERY 8 HOURS PRN
Qty: 30 TABLET | Refills: 0 | Status: SHIPPED | OUTPATIENT
Start: 2019-06-10 | End: 2019-07-08 | Stop reason: SDUPTHER

## 2019-06-10 NOTE — TELEPHONE ENCOUNTER
[facility] ARRIVES AMBULATORY FOR MD VISIT  DR Danilo Diggs IN TO SEE PATIENT  ORDERS RECEIVED  CT SCAN ABD/PELVIS W/O CONTRAST SOON PATIENT TO CALL ON Friday FOR RESULTS  SCRIPT SENT TO PATIENT'S PHARMACY  RV 6 WEEKS W/ LAB PRIOR  CT SCAN ABD/PELVIS 6/13/19 @ 3PM ARRIVE @ 2PM [place]  LAB: [facility] CMP [facility] 7/15/19  MD VISIT 7/22/19 @ 3:40PM  AVS PRINTED AND GIVEN TO PATIENT W/ INSTRUCTIONS  PATIENT DISCHARGED AMBULATORY

## 2019-06-10 NOTE — PROGRESS NOTES
The patent is here for a follow up for MALT. He reports nausea, difficulty breathing that started a few months ago and has gotten progressively worse in the past 4 weeks. His last endoscopy was about 1 year ago and has GI appointment scheduled. Past Medical History   has a past medical history of Arthritis, Tracy's esophagus, Brunner's gland adenoma, CAD (coronary artery disease), Colon polyp, Dysphagia, Esophagitis, Gastritis, GERD (gastroesophageal reflux disease), Hyperlipidemia, Hypertension, Lipoma, Myocardial infarction ([address] Nausea & vomiting, Pyogenic granuloma, and Vascular abnormality. Surgical History   has a past surgical history that includes Cardiac catheterization; Carotid endarterectomy; Cervical discectomy; Lumbar disc surgery; arthroplasty; arthroplasty; Tonsillectomy; Inguinal hernia repair; Brain tumor excision (2008); Cervical spine surgery (6-25-13); Coronary artery bypass graft (3/6/2014); Upper gastrointestinal endoscopy (4/30/2015); Upper gastrointestinal endoscopy (5/18/16); Upper gastrointestinal endoscopy (01/25/2017); joint replacement; other surgical history (Left, 12/12/2017); Upper gastrointestinal endoscopy (08/08/2018); Upper gastrointestinal endoscopy (8/8/2018); Colonoscopy (11/21/2018); and Colonoscopy (N/A, 11/21/2018). Home Medications  has a current medication list which includes the following prescription(s): isosorbide mononitrate, nitroglycerin, lactulose, linaclotide, omeprazole, sucralfate, metoprolol tartrate, hydrocodone-acetaminophen, vitamin c, clopidogrel, alprazolam, atorvastatin, docusate, albuterol sulfate hfa, fentanyl, and aspirin. Allergies:  Vancomycin      Review of Systems :      Constitutional: Moderate fatigue, nausea   HEENT: negative for sore mouth, sore throat, hoarseness and voice change; blind in left eye - as noted.   Recurrent headaches  Respiratory: negative for cough , sputum, hemoptysis, chest pain; +dsypnea  Cardiovascular: 06/03/2019     06/03/2019       Chemistry        Component Value Date/Time     06/03/2019 0950    K 4.3 06/03/2019 0950     06/03/2019 0950    CO2 25 06/03/2019 0950    BUN 28 (H) 06/03/2019 0950    CREATININE 1.61 (H) 06/03/2019 0950        Component Value Date/Time    CALCIUM 9.3 06/03/2019 0950    ALKPHOS 58 06/03/2019 0950    AST 10 06/03/2019 0950    ALT 11 06/03/2019 0950    BILITOT 0.35 06/03/2019 0950            PATHOLOGY:      REVIEW OF RADIOLOGICAL RESULTS:          Assessment:    1. Gastric MALTOMA, s/p EMR with postive margins,H. Pylori negative by IHC during scope but positive antigen test in the stool. Case was discussed in the tumor board. Plans changes from radiation to H. pylori eradication treatment by triple therapy. Started amoxicillin/Biaxin and Protonix on 1 September. He finished antibiotics component of the treatment he will continue on PPI  2. EGD showed no active lymphoma, he was reassured. Ct scan was also reassuring   3. Pulm nodules, relatively stable . 4. Multiple comorbidities in the form of CAD, Anemia and CK D  5. Nausea and shortness of breath- follow up with GI  6. Plan for CT          Plan:   1. We reviewed his lab work his creatinine is elevated, other counts are stable. 2. We discussed his symptoms and I offered admission, he declined. 3. I advised him to maintain hydration and to schedule GI as soon as possible. .  4. I am ordering CT without contrast.   5. If his symptoms persists he will be admitted. 6. I am writing for Zofran. 7. Return based on GI visit and CT - probably 6 weeks.      SHIMA MORAOWELL [facility] MD [place]  Hematologist/Medical Oncologist  Cell: [phone]

## 2019-06-13 ENCOUNTER — HOSPITAL ENCOUNTER (OUTPATIENT)
Dept: CT IMAGING | Facility: CLINIC | Age: 74
Discharge: HOME | End: 2019-06-15
Payer: MEDICARE

## 2019-06-13 ENCOUNTER — HOSPITAL ENCOUNTER (OUTPATIENT)
Facility: CLINIC | Age: 74
End: 2019-06-13
Payer: MEDICARE

## 2019-06-13 DIAGNOSIS — C88.4 MALT (MUCOSA ASSOCIATED LYMPHOID TISSUE) (HCC): ICD-10-CM

## 2019-06-13 DIAGNOSIS — R07.9 CHEST PAIN DUE TO GERD: ICD-10-CM

## 2019-06-13 DIAGNOSIS — K21.9 CHEST PAIN DUE TO GERD: ICD-10-CM

## 2019-06-13 DIAGNOSIS — C88.4 MALT (MUCOSA ASSOCIATED LYMPHOID TISSUE) (HCC): Primary | ICD-10-CM

## 2019-06-13 PROCEDURE — 71250 CT THORAX DX C-: CPT

## 2019-06-13 PROCEDURE — 74176 CT ABD & PELVIS W/O CONTRAST: CPT

## 2019-06-13 PROCEDURE — 6360000004 HC RX CONTRAST MEDICATION: Performed by: INTERNAL MEDICINE

## 2019-06-13 RX ADMIN — IOHEXOL 50 ML: 240 INJECTION, SOLUTION INTRATHECAL; INTRAVASCULAR; INTRAVENOUS; ORAL at 14:22

## 2019-06-14 ENCOUNTER — TELEPHONE (OUTPATIENT)
Dept: ONCOLOGY | Age: 74
End: 2019-06-14

## 2019-06-14 NOTE — TELEPHONE ENCOUNTER
Dr. Андрей Izaguirre reviewed pt's CT scan results and states no orders for pt at this time, appears unchanged. OK to let pt know results, wife notified of CT scan results, states pt complains of some nausea, and notified can speak with PCP, regarding CT findings and nausea complaints. Pt's wife states understanding.

## 2019-06-14 NOTE — TELEPHONE ENCOUNTER
Cameron Pruitt, wife calling for CT scan results. Note to md to see if able to notify pt of results.

## 2019-06-18 ENCOUNTER — TELEPHONE (OUTPATIENT)
Dept: GASTROENTEROLOGY | Age: 74
End: 2019-06-18

## 2019-06-19 ENCOUNTER — OFFICE VISIT (OUTPATIENT)
Dept: GASTROENTEROLOGY | Age: 74
End: 2019-06-19
Payer: MEDICARE

## 2019-06-19 VITALS
SYSTOLIC BLOOD PRESSURE: 150 MMHG | HEART RATE: 58 BPM | DIASTOLIC BLOOD PRESSURE: 40 MMHG | WEIGHT: 148.4 LBS | BODY MASS INDEX: 24.7 KG/M2

## 2019-06-19 DIAGNOSIS — K22.70 BARRETT'S ESOPHAGUS WITHOUT DYSPLASIA: Primary | ICD-10-CM

## 2019-06-19 DIAGNOSIS — R11.2 NAUSEA AND VOMITING, INTRACTABILITY OF VOMITING NOT SPECIFIED, UNSPECIFIED VOMITING TYPE: ICD-10-CM

## 2019-06-19 DIAGNOSIS — R63.4 WEIGHT LOSS: ICD-10-CM

## 2019-06-19 DIAGNOSIS — C88.4 MALT (MUCOSA ASSOCIATED LYMPHOID TISSUE) (HCC): ICD-10-CM

## 2019-06-19 PROCEDURE — 1123F ACP DISCUSS/DSCN MKR DOCD: CPT | Performed by: INTERNAL MEDICINE

## 2019-06-19 PROCEDURE — 99214 OFFICE O/P EST MOD 30 MIN: CPT | Performed by: INTERNAL MEDICINE

## 2019-06-19 PROCEDURE — 4040F PNEUMOC VAC/ADMIN/RCVD: CPT | Performed by: INTERNAL MEDICINE

## 2019-06-19 PROCEDURE — G8599 NO ASA/ANTIPLAT THER USE RNG: HCPCS | Performed by: INTERNAL MEDICINE

## 2019-06-19 PROCEDURE — 3017F COLORECTAL CA SCREEN DOC REV: CPT | Performed by: INTERNAL MEDICINE

## 2019-06-19 PROCEDURE — G8427 DOCREV CUR MEDS BY ELIG CLIN: HCPCS | Performed by: INTERNAL MEDICINE

## 2019-06-19 PROCEDURE — 1036F TOBACCO NON-USER: CPT | Performed by: INTERNAL MEDICINE

## 2019-06-19 PROCEDURE — G8420 CALC BMI NORM PARAMETERS: HCPCS | Performed by: INTERNAL MEDICINE

## 2019-06-19 ASSESSMENT — ENCOUNTER SYMPTOMS
ALLERGIC/IMMUNOLOGIC NEGATIVE: 1
RECTAL PAIN: 0
CONSTIPATION: 1
TROUBLE SWALLOWING: 0
ABDOMINAL PAIN: 0
NAUSEA: 1
BACK PAIN: 1
BLOOD IN STOOL: 0
ABDOMINAL DISTENTION: 0
DIARRHEA: 0
ANAL BLEEDING: 0
VOMITING: 1
RESPIRATORY NEGATIVE: 1

## 2019-06-19 NOTE — PROGRESS NOTES
[facility] CARDIOLOGY CLINIC  [facility] UROLOGY CLINIC, [place]  [address] [facility]  [facility] [address] [phone]      RANDI Nichols MD FACS                    MD CHUNG Rodriguez PA-C Heather M. Stefaniak, MD Megan Krumrie-Horkey, PA-C Vannhu Nguyen, MD Richard B. Windsor, MD FACS  Chavez Rodríguez MD      5/17/2018     Procedure Performed Today:     Catheter irrigation      PROBLEMS    In going to the bathroom, you may experience some burning, discomfort, increased frequency and possibly some blood in your urine.     DO THIS FIRST    Increasing the amount of fluids you drink will help dilute your urine and relieve these symptoms sooner. Also rest will help to slow the bleeding down.    THEN CALL IF    You develop a fever greater than 101.  If you are unable to urinate.  If your urine becomes bright red and doesn't clear with increased fluids and rest.   If burning, frequency, or discomfort become worse.    [address] [facility], [address]     [address] [place] [address].       [address] [facility]    [address] [place] [id number]       [place] [address]    [place], [place] [address] [place] [address] [phone]   Subjective:      Patient ID: Frieda Fernandez is a 75 y.o. male. Dr. Dao Arceo our mutual patient Frieda Fernandez was seen  for   1. Tracy's esophagus without dysplasia    2. Nausea and vomiting, intractability of vomiting not specified, unspecified vomiting type    3. Weight loss    4. MALT (mucosa associated lymphoid tissue) ([facility] [address]     . Here for f/u with his wife  Has recently been seen by oncology and had CT chest and abdomen and were reviewed with them  He has lost about 5 lbs  Gall stones and dilated CBD  Has been nauseated and not eating well  Has some breathing issues  No bleeding  No melena  Had EGD and Colonoscopy last year  Has anxiety issues  Has elevated creatinine   Past Medical, Family, and Social History reviewed and does contribute to the patient presenting condition. patient\"s PMH/PSH,SH,PSYCH hx, MEDs, ALLERGIES, and ROS was all reviewed and updated ion the appropriate [id number] lvm to cb and cf appt,ins,id and copay - cb    Gastroesophageal Reflux   He complains of nausea. He reports no abdominal pain. Associated symptoms include fatigue. Review of Systems   Constitutional: Positive for fatigue. HENT: Positive for dental problem. Negative for trouble swallowing (denies). Eyes: Positive for visual disturbance. States eye issue started this morning. Hx of cat sx in April   Respiratory: Negative. Cardiovascular: Negative. Gastrointestinal: Positive for constipation, nausea and vomiting. Negative for abdominal distention, abdominal pain, anal bleeding, blood in stool, diarrhea and rectal pain. Endocrine: Negative. Genitourinary: Negative. Musculoskeletal: Positive for arthralgias and back pain. Skin: Negative. Allergic/Immunologic: Negative. Neurological: Positive for dizziness, weakness and light-headedness. Hematological: Bruises/bleeds easily. Psychiatric/Behavioral: Positive for sleep disturbance. The patient is nervous/anxious. Reviewed and agree  Objective:   Physical Exam   Constitutional: He is oriented to person, place, and time. He appears well-developed and well-nourished. Anxious    HENT:   Head: Normocephalic and atraumatic. Eyes: Pupils are equal, round, and reactive to light. Conjunctivae and EOM are normal.   Neck: Normal range of motion. Neck supple. Cardiovascular: Normal rate and regular rhythm. Pulmonary/Chest: Effort normal and breath sounds normal.   Abdominal: Soft. Bowel sounds are normal.    TENDER, NON DISTENTED  LIVER SPLEEN AND HERNIAS ARE NOT  PALPABLE  BOWEL SOUNDS ARE POSITIVE      Genitourinary: Rectum normal.   Musculoskeletal: Normal range of motion. Neurological: He is alert and oriented to person, place, and time. He has normal reflexes. Skin: Skin is warm. Vitals reviewed. Assessment:      Patient Active Problem List   Diagnosis    Kyphosis of cervical region    ST elevation myocardial infarction (STEMI) of inferoposterior wall (HCC)    Cardiogenic shock (HCC)    Hyperlipidemia    Smoker    CAD (coronary artery disease)    CARIN (acute kidney injury) ([address]    S/P CABG (coronary artery bypass graft)    Anemia due to gastrointestinal blood loss    Radiculopathy    Ex-smoker    Nausea and vomiting    GERD (gastroesophageal reflux disease)    Dysphagia    MALT (mucosa associated lymphoid tissue) (HCC)    Pulmonary infiltrate    Gastritis    Pyogenic granuloma    Esophagitis    Lipoma    Brunner's gland adenoma    Left-sided carotid artery disease ([address]    Subclavian artery stenosis, left (HCC)    Skull mass    Chest pain due to GERD    Tracy's esophagus    Colon polyp           Plan: Will check LFTs    Will get HIDA    Cant get MRCP at this time sec to elevated creatinine     Will rec referral to Renal    He needs to see Pulmonary as well    Pt was advised in detail about some life style and dietary modifications.  He was advised about avoidance of caffeine, nicotine and chocolate. Pt was also told to stay away from any kind of fast foods, soda pops. He was also advised to avoid lots of spices, grease and fried food etc.     Instructions were also given about trying to arrange the timing, quality and quantity of food. Instructions were given about using ample amount of fiber including dietary and supplemental fiber either metamucil, bennafiber or citrucell etc.  Pt was advised about drinking ample amount of water without any colors or chemicals. Stress was given about regular exercise. Pt has verbalized understanding and agreement to these modifications.       The patient was instructed to start taking some OTC Probiotics products   These are available over the counter at the Pharmacy stores and Grocery stores  He was explained about the beneficial effects they have in the GI track  They will help to establish the good bacterial rachel and will help with the digestion and bowel movements  The patient has verbalized understanding and agreement to this plan

## 2019-06-26 ENCOUNTER — HOSPITAL ENCOUNTER (OUTPATIENT)
Age: 74
Discharge: HOME | End: 2019-06-26
Payer: MEDICARE

## 2019-06-26 ENCOUNTER — HOSPITAL ENCOUNTER (OUTPATIENT)
Dept: NUCLEAR MEDICINE | Age: 74
Discharge: HOME | End: 2019-06-28
Payer: MEDICARE

## 2019-06-26 DIAGNOSIS — R63.4 WEIGHT LOSS: ICD-10-CM

## 2019-06-26 DIAGNOSIS — K22.70 BARRETT'S ESOPHAGUS WITHOUT DYSPLASIA: ICD-10-CM

## 2019-06-26 DIAGNOSIS — R11.2 NAUSEA AND VOMITING, INTRACTABILITY OF VOMITING NOT SPECIFIED, UNSPECIFIED VOMITING TYPE: ICD-10-CM

## 2019-06-26 DIAGNOSIS — C88.4 MALT (MUCOSA ASSOCIATED LYMPHOID TISSUE) (HCC): ICD-10-CM

## 2019-06-26 LAB
ALBUMIN SERPL-MCNC: 3.8 G/DL (ref 3.5–5.2)
ALBUMIN/GLOBULIN RATIO: 1.4 (ref 1–2.5)
ALP BLD-CCNC: 73 U/L (ref 40–129)
ALT SERPL-CCNC: 9 U/L (ref 5–41)
AST SERPL-CCNC: 12 U/L
BILIRUB SERPL-MCNC: 0.36 MG/DL (ref 0.3–1.2)
BILIRUBIN DIRECT: 0.12 MG/DL
BILIRUBIN, INDIRECT: 0.24 MG/DL (ref 0–1)
GLOBULIN: NORMAL G/DL (ref 1.5–3.8)
TOTAL PROTEIN: 6.5 G/DL (ref 6.4–8.3)

## 2019-06-26 PROCEDURE — 78226 HEPATOBILIARY SYSTEM IMAGING: CPT

## 2019-06-26 PROCEDURE — 80076 HEPATIC FUNCTION PANEL: CPT

## 2019-06-26 PROCEDURE — 36415 COLL VENOUS BLD VENIPUNCTURE: CPT

## 2019-06-26 PROCEDURE — 3430000000 HC RX DIAGNOSTIC RADIOPHARMACEUTICAL: Performed by: INTERNAL MEDICINE

## 2019-06-26 PROCEDURE — A9537 TC99M MEBROFENIN: HCPCS | Performed by: INTERNAL MEDICINE

## 2019-06-26 RX ADMIN — Medication 5.9 MILLICURIE: at 09:25

## 2019-07-01 ENCOUNTER — TELEPHONE (OUTPATIENT)
Dept: GASTROENTEROLOGY | Age: 74
End: 2019-07-01

## 2019-07-08 RX ORDER — ONDANSETRON 4 MG/1
TABLET, ORALLY DISINTEGRATING ORAL
Qty: 90 TABLET | Refills: 3 | OUTPATIENT
Start: 2019-07-08 | End: 2020-01-21

## 2019-07-10 ENCOUNTER — HOSPITAL ENCOUNTER (OUTPATIENT)
Facility: MEDICAL CENTER | Age: 74
End: 2019-07-10
Payer: MEDICARE

## 2019-07-16 ENCOUNTER — HOSPITAL ENCOUNTER (OUTPATIENT)
Facility: MEDICAL CENTER | Age: 74
End: 2019-07-16
Payer: MEDICARE

## 2019-07-18 ENCOUNTER — HOSPITAL ENCOUNTER (OUTPATIENT)
Facility: MEDICAL CENTER | Age: 74
Discharge: HOME | End: 2019-07-18
Payer: MEDICARE

## 2019-07-18 DIAGNOSIS — C88.4 MALT (MUCOSA ASSOCIATED LYMPHOID TISSUE) (HCC): Primary | ICD-10-CM

## 2019-07-18 DIAGNOSIS — C88.4 MALT (MUCOSA ASSOCIATED LYMPHOID TISSUE) (HCC): ICD-10-CM

## 2019-07-18 LAB
ABSOLUTE EOS #: 0.34 K/UL (ref 0–0.4)
ABSOLUTE IMMATURE GRANULOCYTE: 0 K/UL (ref 0–0.3)
ABSOLUTE LYMPH #: 2.96 K/UL (ref 1–4.8)
ABSOLUTE MONO #: 1.6 K/UL (ref 0.2–0.8)
ALBUMIN SERPL-MCNC: 4.1 G/DL (ref 3.5–5.2)
ALBUMIN/GLOBULIN RATIO: ABNORMAL (ref 1–2.5)
ALP BLD-CCNC: 63 U/L (ref 40–129)
ALT SERPL-CCNC: 13 U/L (ref 5–41)
ANION GAP SERPL CALCULATED.3IONS-SCNC: 11 MMOL/L (ref 9–17)
AST SERPL-CCNC: 11 U/L
BASOPHILS # BLD: 1 %
BASOPHILS ABSOLUTE: 0.11 K/UL (ref 0–0.2)
BILIRUB SERPL-MCNC: 0.59 MG/DL (ref 0.3–1.2)
BUN BLDV-MCNC: 29 MG/DL (ref 8–23)
BUN/CREAT BLD: 23 (ref 9–20)
CALCIUM SERPL-MCNC: 9.1 MG/DL (ref 8.6–10.4)
CHLORIDE BLD-SCNC: 99 MMOL/L (ref 98–107)
CO2: 26 MMOL/L (ref 20–31)
CREAT SERPL-MCNC: 1.25 MG/DL (ref 0.7–1.2)
DIFFERENTIAL TYPE: ABNORMAL
EOSINOPHILS RELATIVE PERCENT: 3 % (ref 1–4)
GFR AFRICAN AMERICAN: >60 ML/MIN
GFR NON-AFRICAN AMERICAN: 56 ML/MIN
GFR SERPL CREATININE-BSD FRML MDRD: ABNORMAL ML/MIN/{1.73_M2}
GFR SERPL CREATININE-BSD FRML MDRD: ABNORMAL ML/MIN/{1.73_M2}
GLUCOSE BLD-MCNC: 102 MG/DL (ref 70–99)
HCT VFR BLD CALC: 38.6 % (ref 40.7–50.3)
HEMOGLOBIN: 12.6 G/DL (ref 13–17)
IMMATURE GRANULOCYTES: 0 %
LACTATE DEHYDROGENASE: 170 U/L (ref 135–225)
LYMPHOCYTES # BLD: 26 % (ref 24–44)
MCH RBC QN AUTO: 30.2 PG (ref 25.2–33.5)
MCHC RBC AUTO-ENTMCNC: 32.6 G/DL (ref 28–38)
MCV RBC AUTO: 92.6 FL (ref 82.6–102.9)
MONOCYTES # BLD: 14 % (ref 1–7)
NRBC AUTOMATED: ABNORMAL PER 100 WBC
PDW BLD-RTO: 15.4 % (ref 11.8–14.4)
PLATELET # BLD: 238 K/UL (ref 138–453)
PLATELET ESTIMATE: ABNORMAL
PMV BLD AUTO: 10.1 FL (ref 8.1–13.5)
POTASSIUM SERPL-SCNC: 4.5 MMOL/L (ref 3.7–5.3)
RBC # BLD: 4.17 M/UL (ref 4.21–5.77)
RBC # BLD: ABNORMAL 10*6/UL
SEG NEUTROPHILS: 56 % (ref 36–66)
SEGMENTED NEUTROPHILS ABSOLUTE COUNT: 6.39 K/UL (ref 1.8–7.7)
SODIUM BLD-SCNC: 136 MMOL/L (ref 135–144)
TOTAL PROTEIN: 6.5 G/DL (ref 6.4–8.3)
WBC # BLD: 11.4 K/UL (ref 3.5–11.3)
WBC # BLD: ABNORMAL 10*3/UL

## 2019-07-18 PROCEDURE — 83615 LACTATE (LD) (LDH) ENZYME: CPT

## 2019-07-18 PROCEDURE — 85025 COMPLETE CBC W/AUTO DIFF WBC: CPT

## 2019-07-18 PROCEDURE — 80053 COMPREHEN METABOLIC PANEL: CPT

## 2019-07-18 PROCEDURE — 36415 COLL VENOUS BLD VENIPUNCTURE: CPT

## 2019-07-22 ENCOUNTER — OFFICE VISIT (OUTPATIENT)
Dept: ONCOLOGY | Age: 74
End: 2019-07-22
Payer: MEDICARE

## 2019-07-22 ENCOUNTER — TELEPHONE (OUTPATIENT)
Dept: ONCOLOGY | Age: 74
End: 2019-07-22

## 2019-07-22 VITALS
TEMPERATURE: 98 F | BODY MASS INDEX: 24.26 KG/M2 | DIASTOLIC BLOOD PRESSURE: 65 MMHG | WEIGHT: 145.8 LBS | RESPIRATION RATE: 15 BRPM | HEART RATE: 68 BPM | SYSTOLIC BLOOD PRESSURE: 144 MMHG

## 2019-07-22 DIAGNOSIS — D50.0 ANEMIA DUE TO GASTROINTESTINAL BLOOD LOSS: ICD-10-CM

## 2019-07-22 PROCEDURE — 99211 OFF/OP EST MAY X REQ PHY/QHP: CPT | Performed by: INTERNAL MEDICINE

## 2019-07-22 PROCEDURE — 1123F ACP DISCUSS/DSCN MKR DOCD: CPT | Performed by: INTERNAL MEDICINE

## 2019-07-22 PROCEDURE — G8599 NO ASA/ANTIPLAT THER USE RNG: HCPCS | Performed by: INTERNAL MEDICINE

## 2019-07-22 PROCEDURE — 4040F PNEUMOC VAC/ADMIN/RCVD: CPT | Performed by: INTERNAL MEDICINE

## 2019-07-22 PROCEDURE — G8420 CALC BMI NORM PARAMETERS: HCPCS | Performed by: INTERNAL MEDICINE

## 2019-07-22 PROCEDURE — 1036F TOBACCO NON-USER: CPT | Performed by: INTERNAL MEDICINE

## 2019-07-22 PROCEDURE — 3017F COLORECTAL CA SCREEN DOC REV: CPT | Performed by: INTERNAL MEDICINE

## 2019-07-22 PROCEDURE — 99214 OFFICE O/P EST MOD 30 MIN: CPT | Performed by: INTERNAL MEDICINE

## 2019-07-22 PROCEDURE — G8427 DOCREV CUR MEDS BY ELIG CLIN: HCPCS | Performed by: INTERNAL MEDICINE

## 2019-07-22 NOTE — PROGRESS NOTES
08/2019 and we will await results including EGD. 4. We discussed eating strategy to manage and possibly avoid nausea. 5. Exam is negative, he remains in remission. 6. Continue with surveillance. 7. Return in 6 months.      Nicolas Agrawal MD  Hematologist/Medical Oncologist  Cell: [phone]

## 2019-08-06 ENCOUNTER — TELEPHONE (OUTPATIENT)
Dept: GASTROENTEROLOGY | Age: 74
End: 2019-08-06

## 2019-08-09 ENCOUNTER — TELEPHONE (OUTPATIENT)
Dept: GASTROENTEROLOGY | Age: 74
End: 2019-08-09

## 2019-08-09 DIAGNOSIS — R11.0 NAUSEA: Primary | ICD-10-CM

## 2019-08-12 NOTE — TELEPHONE ENCOUNTER
Wife calls this morning states patient is scheduled for August 28, 2019 @ [address] for an EGD but patient prefers to do this on Wednesday September 4,  Writer has scheduled patient at [address] [place] [address] for Wednesday September 4, 2019 @ 12:30pm with arrival @ 11:00am  The patients wife requested I mail the EGD instructions to the home address

## 2019-08-27 ENCOUNTER — TELEPHONE (OUTPATIENT)
Dept: GASTROENTEROLOGY | Age: 74
End: 2019-08-27

## 2019-08-28 ENCOUNTER — TELEPHONE (OUTPATIENT)
Dept: GASTROENTEROLOGY | Age: 74
End: 2019-08-28

## 2019-09-03 ENCOUNTER — ANESTHESIA EVENT (OUTPATIENT)
Dept: OPERATING ROOM | Age: 74
End: 2019-09-03
Payer: MEDICARE

## 2019-09-04 ENCOUNTER — ANESTHESIA (OUTPATIENT)
Dept: OPERATING ROOM | Age: 74
End: 2019-09-04
Payer: MEDICARE

## 2019-09-04 ENCOUNTER — HOSPITAL ENCOUNTER (OUTPATIENT)
Age: 74
Setting detail: OUTPATIENT SURGERY
Discharge: HOME | End: 2019-09-04
Attending: INTERNAL MEDICINE | Admitting: INTERNAL MEDICINE
Payer: MEDICARE

## 2019-09-04 VITALS
HEART RATE: 48 BPM | HEIGHT: 65 IN | DIASTOLIC BLOOD PRESSURE: 76 MMHG | RESPIRATION RATE: 16 BRPM | SYSTOLIC BLOOD PRESSURE: 147 MMHG | OXYGEN SATURATION: 97 % | TEMPERATURE: 97.5 F | BODY MASS INDEX: 24.16 KG/M2 | WEIGHT: 145 LBS

## 2019-09-04 VITALS — DIASTOLIC BLOOD PRESSURE: 56 MMHG | SYSTOLIC BLOOD PRESSURE: 121 MMHG | OXYGEN SATURATION: 99 %

## 2019-09-04 PROCEDURE — 3609012400 HC EGD TRANSORAL BIOPSY SINGLE/MULTIPLE: Performed by: INTERNAL MEDICINE

## 2019-09-04 PROCEDURE — 6360000002 HC RX W HCPCS: Performed by: ANESTHESIOLOGY

## 2019-09-04 PROCEDURE — 2580000003 HC RX 258: Performed by: ANESTHESIOLOGY

## 2019-09-04 PROCEDURE — 7100000011 HC PHASE II RECOVERY - ADDTL 15 MIN: Performed by: INTERNAL MEDICINE

## 2019-09-04 PROCEDURE — 88305 TISSUE EXAM BY PATHOLOGIST: CPT

## 2019-09-04 PROCEDURE — 3700000000 HC ANESTHESIA ATTENDED CARE: Performed by: INTERNAL MEDICINE

## 2019-09-04 PROCEDURE — 2709999900 HC NON-CHARGEABLE SUPPLY: Performed by: INTERNAL MEDICINE

## 2019-09-04 PROCEDURE — 43239 EGD BIOPSY SINGLE/MULTIPLE: CPT | Performed by: INTERNAL MEDICINE

## 2019-09-04 PROCEDURE — 7100000010 HC PHASE II RECOVERY - FIRST 15 MIN: Performed by: INTERNAL MEDICINE

## 2019-09-04 PROCEDURE — 2500000003 HC RX 250 WO HCPCS: Performed by: ANESTHESIOLOGY

## 2019-09-04 RX ORDER — LIDOCAINE HYDROCHLORIDE 10 MG/ML
1 INJECTION, SOLUTION EPIDURAL; INFILTRATION; INTRACAUDAL; PERINEURAL
Status: DISCONTINUED | OUTPATIENT
Start: 2019-09-04 | End: 2019-09-04 | Stop reason: HOSPADM

## 2019-09-04 RX ORDER — SODIUM CHLORIDE 9 MG/ML
INJECTION, SOLUTION INTRAVENOUS CONTINUOUS
Status: DISCONTINUED | OUTPATIENT
Start: 2019-09-04 | End: 2019-09-04

## 2019-09-04 RX ORDER — LIDOCAINE HYDROCHLORIDE 20 MG/ML
INJECTION, SOLUTION INFILTRATION; PERINEURAL PRN
Status: DISCONTINUED | OUTPATIENT
Start: 2019-09-04 | End: 2019-09-04 | Stop reason: SDUPTHER

## 2019-09-04 RX ORDER — PROPOFOL 10 MG/ML
INJECTION, EMULSION INTRAVENOUS PRN
Status: DISCONTINUED | OUTPATIENT
Start: 2019-09-04 | End: 2019-09-04 | Stop reason: SDUPTHER

## 2019-09-04 RX ORDER — SODIUM CHLORIDE, SODIUM LACTATE, POTASSIUM CHLORIDE, CALCIUM CHLORIDE 600; 310; 30; 20 MG/100ML; MG/100ML; MG/100ML; MG/100ML
INJECTION, SOLUTION INTRAVENOUS CONTINUOUS
Status: DISCONTINUED | OUTPATIENT
Start: 2019-09-04 | End: 2019-09-04 | Stop reason: HOSPADM

## 2019-09-04 RX ORDER — ONDANSETRON 2 MG/ML
4 INJECTION INTRAMUSCULAR; INTRAVENOUS
Status: DISCONTINUED | OUTPATIENT
Start: 2019-09-04 | End: 2019-09-04 | Stop reason: HOSPADM

## 2019-09-04 RX ORDER — FENTANYL CITRATE 50 UG/ML
50 INJECTION, SOLUTION INTRAMUSCULAR; INTRAVENOUS EVERY 5 MIN PRN
Status: DISCONTINUED | OUTPATIENT
Start: 2019-09-04 | End: 2019-09-04 | Stop reason: HOSPADM

## 2019-09-04 RX ORDER — FENTANYL CITRATE 50 UG/ML
25 INJECTION, SOLUTION INTRAMUSCULAR; INTRAVENOUS EVERY 5 MIN PRN
Status: DISCONTINUED | OUTPATIENT
Start: 2019-09-04 | End: 2019-09-04 | Stop reason: HOSPADM

## 2019-09-04 RX ORDER — SODIUM CHLORIDE 0.9 % (FLUSH) 0.9 %
10 SYRINGE (ML) INJECTION EVERY 12 HOURS SCHEDULED
Status: DISCONTINUED | OUTPATIENT
Start: 2019-09-04 | End: 2019-09-04 | Stop reason: HOSPADM

## 2019-09-04 RX ORDER — SODIUM CHLORIDE 0.9 % (FLUSH) 0.9 %
10 SYRINGE (ML) INJECTION PRN
Status: DISCONTINUED | OUTPATIENT
Start: 2019-09-04 | End: 2019-09-04 | Stop reason: HOSPADM

## 2019-09-04 RX ADMIN — SODIUM CHLORIDE, POTASSIUM CHLORIDE, SODIUM LACTATE AND CALCIUM CHLORIDE: 600; 310; 30; 20 INJECTION, SOLUTION INTRAVENOUS at 12:14

## 2019-09-04 RX ADMIN — LIDOCAINE HYDROCHLORIDE 60 MG: 20 INJECTION, SOLUTION INFILTRATION; PERINEURAL at 12:15

## 2019-09-04 RX ADMIN — PROPOFOL 50 MG: 10 INJECTION, EMULSION INTRAVENOUS at 12:15

## 2019-09-04 RX ADMIN — SODIUM CHLORIDE, POTASSIUM CHLORIDE, SODIUM LACTATE AND CALCIUM CHLORIDE: 600; 310; 30; 20 INJECTION, SOLUTION INTRAVENOUS at 11:50

## 2019-09-04 ASSESSMENT — PAIN SCALES - GENERAL
PAINLEVEL_OUTOF10: 0
PAINLEVEL_OUTOF10: 0

## 2019-09-04 ASSESSMENT — PULMONARY FUNCTION TESTS
PIF_VALUE: 1

## 2019-09-04 ASSESSMENT — PAIN - FUNCTIONAL ASSESSMENT: PAIN_FUNCTIONAL_ASSESSMENT: 0-10

## 2019-09-04 NOTE — OP NOTE
PROCEDURE NOTE    DATE OF PROCEDURE: 9/4/2019     SURGEON: Segundo Hernandes MD    ASSISTANT: None    PREOPERATIVE DIAGNOSIS: GERD  HX OF MALTOMA  NAUSEA  WEIGHT LOSS    POSTOPERATIVE DIAGNOSIS: As described below    OPERATION: Upper GI endoscopy with Biopsy    ANESTHESIA: Moderate Sedation     ESTIMATED BLOOD LOSS: Less than 50 ml    COMPLICATIONS: None. SPECIMENS:  Was Obtained:     HISTORY: The patient is a 75y.o. year old male with history of above preop diagnosis. I recommended esophagogastroduodenoscopy with possible biopsy and I explained the risk, benefits, expected outcome, and alternatives to the procedure. Risks included but are not limited to bleeding, infection, respiratory distress, hypotension, and perforation of the esophagus, stomach, or duodenum. Patient understands and is in agreement. PROCEDURE: The patient was given IV conscious sedation. The patient's SPO2 remained above 90% throughout the procedure. The gastroscope was inserted orally and advanced under direct vision through the esophagus, through the stomach, through the pylorus, and into the descending duodenum. Findings:    Retropharyngeal area was grossly normal appearing    Esophagus: abnormal: MILD IRREGULAR SCJ WITH FEW SMALL ISLANDS   BIOPSIES AND PICTURES WERE TAKEN    Stomach:    Fundus: normal      Body: abnormal: JANELLE OF SCARRING CONSISTENT WITH PREVIOUS EMR REMOVAL OF THE MALTOMA  BIOPSIES AND PICTURES WERE TAKEN      Antrum: abnormal: MOD GASTRITIS WAS BIOPSIED    Duodenum:     Descending: normal    Bulb: normal    The scope was removed and the patient tolerated the procedure well. Recommendations/Plan:   1. F/U Biopsies  2. F/U In Office in 3-4 weeks  3. Discussed with the family  4.  Post sedation patient was stable with stable vital signs and stable O2 saturations    Electronically signed by Segundo Hernandes MD  on 9/4/2019 at 12:23 PM

## 2019-09-04 NOTE — ANESTHESIA PRE PROCEDURE
 Gastritis     GERD (gastroesophageal reflux disease)     Hx of blood clots     3 clots right leg, left eye    Hyperlipidemia     Hypertension     Lipoma     Myocardial infarction (HCC)     STRESS DONE    Nausea & vomiting     Pyogenic granuloma     Vascular abnormality     CAROTID       Past Surgical History:        Procedure Laterality Date    ARTHROPLASTY      RIGHT KNEE X3-INFECTION    ARTHROPLASTY      LEFT KNEE    BRAIN TUMOR EXCISION      excision    CARDIAC CATHETERIZATION      CAROTID ENDARTERECTOMY      LEFT    CERVICAL DISCECTOMY      CERVICAL SPINE SURGERY  13    ACF 3-4, 4-5    COLONOSCOPY  2018    hyperplastic polyp    COLONOSCOPY N/A 2018    COLONOSCOPY POLYPECTOMY SNARE/COLD BIOPSY performed by Ramana Cobian MD at [place] [address]  3/6/2014    CABG X 3    ENDOSCOPY, COLON, DIAGNOSTIC      INGUINAL HERNIA REPAIR      LEFT    JOINT REPLACEMENT      Bilat knees    LUMBAR DISC SURGERY      HARDWARE    OTHER SURGICAL HISTORY Left 2017     1) Arch aortography 2) Selective left subclavian angiography 3) Left subclavian artery angioplasty with 7 mm x 40 mm and 8 mm x 40 mm [place] balloons     TONSILLECTOMY      UPPER GASTROINTESTINAL ENDOSCOPY  2015    UPPER GASTROINTESTINAL ENDOSCOPY  16    lipomatous lump; esophagitis; pyogenic granuloma; mild gastritis    UPPER GASTROINTESTINAL ENDOSCOPY  2017    lipoma; prominet Brunner's gland; gastritis    UPPER GASTROINTESTINAL ENDOSCOPY  2018    CASTILLO'S    UPPER GASTROINTESTINAL ENDOSCOPY  2018    EGD BIOPSY performed by Ramana Cobian MD at [place] [address] History:    Social History     Tobacco Use    Smoking status: Former Smoker     Packs/day: 0.25     Years: 30.00     Pack years: 7.50     Types: Cigarettes     Last attempt to quit: 2018     Years since quittin.3    Smokeless tobacco: Former User    Tobacco comment: Shahla [address] CIGARETTES/SMOKES CIGARS   Substance Use Topics    Alcohol use: No     Comment: QUIT; H/O ETOH abuse                                Counseling given: Not Answered  Comment: QUIT CIGARETTES/SMOKES CIGARS      Vital Signs (Current):   Vitals:    09/04/19 1130 09/04/19 1131   BP: 128/64    Pulse: 50    Resp: 20    Temp: 98.2 °F (36.8 °C)    TempSrc: Oral    SpO2: 96%    Weight:  145 lb (65.8 kg)   Height:  5' 5\" (1.651 m)                                              BP Readings from Last 3 Encounters:   09/04/19 128/64   07/22/19 (!) 144/65   06/19/19 (!) 150/40       NPO Status:                                                                                 BMI:   Wt Readings from Last 3 Encounters:   09/04/19 145 lb (65.8 kg)   07/22/19 145 lb 12.8 oz (66.1 kg)   06/19/19 148 lb 6.4 oz (67.3 kg)     Body mass index is 24.13 kg/m². CBC:   Lab Results   Component Value Date    WBC 11.4 07/18/2019    RBC 4.17 07/18/2019    HGB 12.6 07/18/2019    HCT 38.6 07/18/2019    MCV 92.6 07/18/2019    RDW 15.4 07/18/2019     07/18/2019       CMP:   Lab Results   Component Value Date     07/18/2019    K 4.5 07/18/2019    CL 99 07/18/2019    CO2 26 07/18/2019    BUN 29 07/18/2019    CREATININE 1.25 07/18/2019    GFRAA >60 07/18/2019    LABGLOM 56 07/18/2019    GLUCOSE 102 07/18/2019    PROT 6.5 07/18/2019    CALCIUM 9.1 07/18/2019    BILITOT 0.59 07/18/2019    ALKPHOS 63 07/18/2019    AST 11 07/18/2019    ALT 13 07/18/2019       POC Tests: No results for input(s): POCGLU, POCNA, POCK, POCCL, POCBUN, POCHEMO, POCHCT in the last 72 hours.     Coags:   Lab Results   Component Value Date    PROTIME 11.1 12/12/2017    INR 1.1 12/12/2017    APTT 29.9 12/12/2017       HCG (If Applicable): No results found for: PREGTESTUR, PREGSERUM, HCG, HCGQUANT     ABGs: No results found for: PHART, PO2ART, WLV3YRK, FGQ0ROR, BEART, Q9KXCJLV     Type & Screen (If Applicable):  No results found for: LABABO, [address]    Anesthesia Evaluation

## 2019-09-04 NOTE — H&P
History and Physical Service   [place] [address]    HISTORY AND PHYSICAL EXAMINATION            Date of Evaluation: 9/4/2019  Patient name:  Paola Guzman  MRN:   [id number]  YOB: 1945  PCP:    Dillan Walker    History Obtained From:     Patient, medical records    History of Present Illness: This is Paola Guzman a 75 y.o. male who presents today for an EGD  by Dr. Deep Napoles  for EVALUATION OF NAUSEA. THE PATIENT HAS HISTORY OF . GASTRIC MALTOMA STAGE 1 E he ALSO HAS H.PYLORI. THE EGD IS FOR EVALUATION. he ALSO HAS HISTORY OF  CASTILLO'S ESOPHAGUS. HE HAS GERD AND IS CONTROLLED WITH MEDICATION. HE DENIES VOMITING,BLEEDING AND DARK OR TARRY STOOLS. HE PRESENTS FOR ANNUAL EGD.       Past Medical History:     Past Medical History:   Diagnosis Date    Arthritis     fentanyl patch    Castillo's esophagus     Brunner's gland adenoma     CAD (coronary artery disease) 3/6/14    stents 2002   CABG 3/6/14    Cancer ([facility] [address]     stomach    Colon polyp 11/21/2018    hyperplastic polyp    Dysphagia     Esophagitis     Gastritis     GERD (gastroesophageal reflux disease)     Hx of blood clots     3 clots right leg, left eye    Hyperlipidemia     Hypertension     Lipoma     Myocardial infarction (HCC)     STRESS DONE    Nausea & vomiting     Pyogenic granuloma     Vascular abnormality     CAROTID        Past Surgical History:     Past Surgical History:   Procedure Laterality Date    ARTHROPLASTY      RIGHT KNEE X3-INFECTION    ARTHROPLASTY      LEFT KNEE    BRAIN TUMOR EXCISION  2008    excision    CARDIAC CATHETERIZATION      CAROTID ENDARTERECTOMY      LEFT    CERVICAL DISCECTOMY      CERVICAL SPINE SURGERY  6-25-13    ACF 3-4, 4-5    COLONOSCOPY  11/21/2018    hyperplastic polyp    COLONOSCOPY N/A 11/21/2018    COLONOSCOPY POLYPECTOMY SNARE/COLD BIOPSY performed by Drea Danielson MD at [place] [address]  3/6/2014    CABG X 3    ENDOSCOPY, COLON, DIAGNOSTIC      INGUINAL HERNIA REPAIR      LEFT    JOINT REPLACEMENT      Bilat knees    LUMBAR DISC SURGERY      HARDWARE    OTHER SURGICAL HISTORY Left 12/12/2017     1) Arch aortography 2) Selective left subclavian angiography 3) Left subclavian artery angioplasty with 7 mm x 40 mm and 8 mm x 40 mm [place] balloons     TONSILLECTOMY      UPPER GASTROINTESTINAL ENDOSCOPY  4/30/2015    UPPER GASTROINTESTINAL ENDOSCOPY  5/18/16    lipomatous lump; esophagitis; pyogenic granuloma; mild gastritis    UPPER GASTROINTESTINAL ENDOSCOPY  01/25/2017    lipoma; prominet Brunner's gland; gastritis    UPPER GASTROINTESTINAL ENDOSCOPY  08/08/2018    CASTILLO'S    UPPER GASTROINTESTINAL ENDOSCOPY  8/8/2018    EGD BIOPSY performed by Brenton Merida MD at [facility]        Medications Prior to Admission:     Prior to Admission medications    Medication Sig Start Date End Date Taking? Authorizing Provider   ondansetron (ZOFRAN-ODT) 4 MG disintegrating tablet DISSOLVE 1 TABLET ON THE TONGUE EVERY 8 HOURS AS NEEDED FOR NAUSEA OR VOMITING 7/8/19  Yes Katie Vela MD   lactulose (CHRONULAC) 10 GM/15ML solution Take 30 mLs by mouth 3 times daily as needed (constipation) 9/27/18  Yes Brenton Merida MD   omeprazole (PRILOSEC) 20 MG delayed release capsule Take 1 capsule by mouth 2 times daily 9/26/18  Yes Brenton Merida MD   metoprolol tartrate (LOPRESSOR) 25 MG tablet Take 0.25 tablets by mouth 2 times daily  Patient taking differently: Take 6.25 mg by mouth daily  4/6/18  Yes Sravan Osborne MD   HYDROcodone-acetaminophen [facility])  MG per tablet Take 1 tablet by mouth every 4 hours as needed for Pain .    Yes Historical Provider, MD   Ascorbic Acid (VITAMIN C) 1000 MG tablet Take 1,000 mg by mouth daily   Yes Historical Provider, MD   ALPRAZolam (XANAX PO) Take 0.5 tablets by mouth 2 times daily as needed    Yes Historical Provider, MD   atorvastatin (LIPITOR) 20 MG tablet Take 20 mg by mouth daily

## 2019-09-05 LAB — SURGICAL PATHOLOGY REPORT: NORMAL

## 2019-09-24 ENCOUNTER — TELEPHONE (OUTPATIENT)
Dept: GASTROENTEROLOGY | Age: 74
End: 2019-09-24

## 2019-09-25 ENCOUNTER — OFFICE VISIT (OUTPATIENT)
Dept: GASTROENTEROLOGY | Age: 74
End: 2019-09-25
Payer: MEDICARE

## 2019-09-25 VITALS
SYSTOLIC BLOOD PRESSURE: 132 MMHG | WEIGHT: 144.8 LBS | BODY MASS INDEX: 24.1 KG/M2 | DIASTOLIC BLOOD PRESSURE: 52 MMHG | HEART RATE: 56 BPM

## 2019-09-25 DIAGNOSIS — F41.9 ANXIETY: ICD-10-CM

## 2019-09-25 DIAGNOSIS — K59.03 DRUG-INDUCED CONSTIPATION: ICD-10-CM

## 2019-09-25 DIAGNOSIS — R63.0 ANOREXIA: ICD-10-CM

## 2019-09-25 DIAGNOSIS — K22.70 BARRETT'S ESOPHAGUS WITHOUT DYSPLASIA: Primary | ICD-10-CM

## 2019-09-25 PROCEDURE — G8427 DOCREV CUR MEDS BY ELIG CLIN: HCPCS | Performed by: INTERNAL MEDICINE

## 2019-09-25 PROCEDURE — 99214 OFFICE O/P EST MOD 30 MIN: CPT | Performed by: INTERNAL MEDICINE

## 2019-09-25 PROCEDURE — 1123F ACP DISCUSS/DSCN MKR DOCD: CPT | Performed by: INTERNAL MEDICINE

## 2019-09-25 PROCEDURE — 3017F COLORECTAL CA SCREEN DOC REV: CPT | Performed by: INTERNAL MEDICINE

## 2019-09-25 PROCEDURE — G8420 CALC BMI NORM PARAMETERS: HCPCS | Performed by: INTERNAL MEDICINE

## 2019-09-25 PROCEDURE — 1036F TOBACCO NON-USER: CPT | Performed by: INTERNAL MEDICINE

## 2019-09-25 PROCEDURE — G8599 NO ASA/ANTIPLAT THER USE RNG: HCPCS | Performed by: INTERNAL MEDICINE

## 2019-09-25 PROCEDURE — 4040F PNEUMOC VAC/ADMIN/RCVD: CPT | Performed by: INTERNAL MEDICINE

## 2019-09-25 RX ORDER — OMEPRAZOLE 20 MG/1
20 CAPSULE, DELAYED RELEASE ORAL DAILY
Qty: 180 CAPSULE | Refills: 1 | Status: SHIPPED | OUTPATIENT
Start: 2019-09-25 | End: 2020-07-17 | Stop reason: ALTCHOICE

## 2019-09-25 ASSESSMENT — ENCOUNTER SYMPTOMS
RESPIRATORY NEGATIVE: 1
ABDOMINAL DISTENTION: 0
ANAL BLEEDING: 0
BACK PAIN: 1
BLOOD IN STOOL: 0
VOMITING: 0
CONSTIPATION: 1
ABDOMINAL PAIN: 1
TROUBLE SWALLOWING: 0
ALLERGIC/IMMUNOLOGIC NEGATIVE: 1
RECTAL PAIN: 0
DIARRHEA: 0
NAUSEA: 1

## 2019-09-30 ENCOUNTER — TELEPHONE (OUTPATIENT)
Dept: GASTROENTEROLOGY | Age: 74
End: 2019-09-30

## 2019-09-30 DIAGNOSIS — K22.70 BARRETT'S ESOPHAGUS WITHOUT DYSPLASIA: Primary | ICD-10-CM

## 2019-09-30 RX ORDER — LACTULOSE 10 G/15ML
20 SOLUTION ORAL 3 TIMES DAILY PRN
Qty: 236 ML | Refills: 1 | Status: SHIPPED | OUTPATIENT
Start: 2019-09-30 | End: 2019-09-30 | Stop reason: SDUPTHER

## 2019-10-01 RX ORDER — LACTULOSE 10 G/15ML
SOLUTION ORAL
Qty: 7080 ML | Refills: 3 | Status: SHIPPED | OUTPATIENT
Start: 2019-10-01 | End: 2021-01-11

## 2020-01-16 ENCOUNTER — HOSPITAL ENCOUNTER (OUTPATIENT)
Facility: MEDICAL CENTER | Age: 75
End: 2020-01-16
Payer: MEDICARE

## 2020-01-21 RX ORDER — ONDANSETRON 4 MG/1
TABLET, ORALLY DISINTEGRATING ORAL
Qty: 90 TABLET | Refills: 3 | Status: SHIPPED | OUTPATIENT
Start: 2020-01-21 | End: 2020-06-23

## 2020-01-24 ENCOUNTER — HOSPITAL ENCOUNTER (OUTPATIENT)
Facility: MEDICAL CENTER | Age: 75
Discharge: HOME | End: 2020-01-24
Payer: MEDICARE

## 2020-01-24 ENCOUNTER — TELEPHONE (OUTPATIENT)
Dept: ONCOLOGY | Age: 75
End: 2020-01-24

## 2020-01-24 ENCOUNTER — OFFICE VISIT (OUTPATIENT)
Dept: ONCOLOGY | Age: 75
End: 2020-01-24
Payer: MEDICARE

## 2020-01-24 VITALS
WEIGHT: 145.8 LBS | RESPIRATION RATE: 18 BRPM | SYSTOLIC BLOOD PRESSURE: 162 MMHG | DIASTOLIC BLOOD PRESSURE: 70 MMHG | HEART RATE: 66 BPM | BODY MASS INDEX: 24.26 KG/M2 | TEMPERATURE: 97.2 F

## 2020-01-24 DIAGNOSIS — D50.0 ANEMIA DUE TO GASTROINTESTINAL BLOOD LOSS: ICD-10-CM

## 2020-01-24 LAB
ABSOLUTE EOS #: 0.18 K/UL (ref 0–0.44)
ABSOLUTE IMMATURE GRANULOCYTE: 0.04 K/UL (ref 0–0.3)
ABSOLUTE LYMPH #: 3.07 K/UL (ref 1.1–3.7)
ABSOLUTE MONO #: 0.91 K/UL (ref 0.1–1.2)
ALBUMIN SERPL-MCNC: 4.5 G/DL (ref 3.5–5.2)
ALBUMIN/GLOBULIN RATIO: ABNORMAL (ref 1–2.5)
ALP BLD-CCNC: 67 U/L (ref 40–129)
ALT SERPL-CCNC: 9 U/L (ref 5–41)
ANION GAP SERPL CALCULATED.3IONS-SCNC: 12 MMOL/L (ref 9–17)
AST SERPL-CCNC: 13 U/L
BASOPHILS # BLD: 1 % (ref 0–2)
BASOPHILS ABSOLUTE: 0.12 K/UL (ref 0–0.2)
BILIRUB SERPL-MCNC: 0.53 MG/DL (ref 0.3–1.2)
BUN BLDV-MCNC: 15 MG/DL (ref 8–23)
BUN/CREAT BLD: 12 (ref 9–20)
CALCIUM SERPL-MCNC: 9.3 MG/DL (ref 8.6–10.4)
CHLORIDE BLD-SCNC: 100 MMOL/L (ref 98–107)
CO2: 26 MMOL/L (ref 20–31)
CREAT SERPL-MCNC: 1.21 MG/DL (ref 0.7–1.2)
DIFFERENTIAL TYPE: ABNORMAL
EOSINOPHILS RELATIVE PERCENT: 2 % (ref 1–4)
GFR AFRICAN AMERICAN: >60 ML/MIN
GFR NON-AFRICAN AMERICAN: 58 ML/MIN
GFR SERPL CREATININE-BSD FRML MDRD: ABNORMAL ML/MIN/{1.73_M2}
GFR SERPL CREATININE-BSD FRML MDRD: ABNORMAL ML/MIN/{1.73_M2}
GLUCOSE BLD-MCNC: 100 MG/DL (ref 70–99)
HCT VFR BLD CALC: 43.9 % (ref 40.7–50.3)
HEMOGLOBIN: 14.2 G/DL (ref 13–17)
IMMATURE GRANULOCYTES: 0 %
LACTATE DEHYDROGENASE: 199 U/L (ref 135–225)
LYMPHOCYTES # BLD: 27 % (ref 24–43)
MCH RBC QN AUTO: 30.7 PG (ref 25.2–33.5)
MCHC RBC AUTO-ENTMCNC: 32.3 G/DL (ref 28.4–34.8)
MCV RBC AUTO: 94.8 FL (ref 82.6–102.9)
MONOCYTES # BLD: 8 % (ref 3–12)
NRBC AUTOMATED: 0 PER 100 WBC
PDW BLD-RTO: 14.3 % (ref 11.8–14.4)
PLATELET # BLD: 271 K/UL (ref 138–453)
PLATELET ESTIMATE: ABNORMAL
PMV BLD AUTO: 9.8 FL (ref 8.1–13.5)
POTASSIUM SERPL-SCNC: 4.4 MMOL/L (ref 3.7–5.3)
RBC # BLD: 4.63 M/UL (ref 4.21–5.77)
RBC # BLD: ABNORMAL 10*6/UL
SEG NEUTROPHILS: 62 % (ref 36–65)
SEGMENTED NEUTROPHILS ABSOLUTE COUNT: 7.2 K/UL (ref 1.5–8.1)
SODIUM BLD-SCNC: 138 MMOL/L (ref 135–144)
TOTAL PROTEIN: 7 G/DL (ref 6.4–8.3)
WBC # BLD: 11.5 K/UL (ref 3.5–11.3)
WBC # BLD: ABNORMAL 10*3/UL

## 2020-01-24 PROCEDURE — 99211 OFF/OP EST MAY X REQ PHY/QHP: CPT

## 2020-01-24 PROCEDURE — 1036F TOBACCO NON-USER: CPT | Performed by: INTERNAL MEDICINE

## 2020-01-24 PROCEDURE — 1123F ACP DISCUSS/DSCN MKR DOCD: CPT | Performed by: INTERNAL MEDICINE

## 2020-01-24 PROCEDURE — 4040F PNEUMOC VAC/ADMIN/RCVD: CPT | Performed by: INTERNAL MEDICINE

## 2020-01-24 PROCEDURE — G8484 FLU IMMUNIZE NO ADMIN: HCPCS | Performed by: INTERNAL MEDICINE

## 2020-01-24 PROCEDURE — 3017F COLORECTAL CA SCREEN DOC REV: CPT | Performed by: INTERNAL MEDICINE

## 2020-01-24 PROCEDURE — G8427 DOCREV CUR MEDS BY ELIG CLIN: HCPCS | Performed by: INTERNAL MEDICINE

## 2020-01-24 PROCEDURE — 99214 OFFICE O/P EST MOD 30 MIN: CPT | Performed by: INTERNAL MEDICINE

## 2020-01-24 PROCEDURE — 36415 COLL VENOUS BLD VENIPUNCTURE: CPT

## 2020-01-24 PROCEDURE — 83615 LACTATE (LD) (LDH) ENZYME: CPT

## 2020-01-24 PROCEDURE — G8420 CALC BMI NORM PARAMETERS: HCPCS | Performed by: INTERNAL MEDICINE

## 2020-01-24 PROCEDURE — 85025 COMPLETE CBC W/AUTO DIFF WBC: CPT

## 2020-01-24 PROCEDURE — 80053 COMPREHEN METABOLIC PANEL: CPT

## 2020-01-24 NOTE — PROGRESS NOTES
Reason for the visit:   Chief Complaint   Patient presents with    Follow-up     REVIEW STATUS OF DISEASE    Results     REVIEW RESULTS LABS DONE TODAY       Pertinent Clinical Problems/ Treatments:    1. Gastric Maltoma, stage IE,PET scan and bone marrow biopsy negative,  2. H. Pylori negative by histology,but + by stool antigen test  3. CAD, CK D, anemia  4. Treatment: Amoxicillin plus Biaxin plus Protonix, started  September 1 2015  5. Observation/ surveillance     Summary of the case/HPI :    He is a 50-year-old patient who is poor historian and with multiple comorbidities including CAD presented in the last few months with chronic nausea as well as heartburn. Denied any bleeding or melena, he had no fever or chills or night sweats. 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 He was referred to [facility] for EUS and Endo mucosal resection which was done in June. Final path consisted with the same histology and margins were positive. Tested for H. pylori and that was positive. The decision was to treat him with anti-H. pylori treatment   He attained good remission, and was observed since then. 12/2017 he developed amaurosis fugax, arthrosclerotic plaque on left carotid artery, plan is carotid endartectomy. 08/2018 EGD, 11/2018 colonoscopy, inflammation was seen in the stomach (mild chronic gastritis)   12/2018 he presented in ER with chest pains, was started on medication, cardiology follow up scheduled. 06/2019 his nausea and shortness of breath have progressively worsened, schedule next available appointment with GI and CT. INTERIM HISTORY: The patent is here for a follow up for MALT. The patient reports he feels nauseated upon waking and it resolves with standing, not as bed when he's been sleeping in a chair.          Past Medical History   has a past medical history of Arthritis, Tracy's esophagus, Brunner's gland adenoma, CAD (coronary artery disease), Cancer ([facility] [address] Colon polyp, Dysphagia, Esophagitis, Gastritis, GERD (gastroesophageal reflux disease), Hx of blood clots, Hyperlipidemia, Hypertension, Lipoma, Myocardial infarction ([facility] [address] Nausea & vomiting, Pyogenic granuloma, and Vascular abnormality. Surgical History   has a past surgical history that includes Cardiac catheterization; Carotid endarterectomy; Cervical discectomy; Lumbar disc surgery; arthroplasty; arthroplasty; Tonsillectomy; Inguinal hernia repair; Brain tumor excision (2008); Cervical spine surgery (6-25-13); Coronary artery bypass graft (3/6/2014); Upper gastrointestinal endoscopy (4/30/2015); Upper gastrointestinal endoscopy (5/18/16); Upper gastrointestinal endoscopy (01/25/2017); joint replacement; other surgical history (Left, 12/12/2017); Upper gastrointestinal endoscopy (08/08/2018); Upper gastrointestinal endoscopy (8/8/2018); Colonoscopy (11/21/2018); Colonoscopy (N/A, 11/21/2018); Endoscopy, colon, diagnostic; Upper gastrointestinal endoscopy (09/04/2019); and Upper gastrointestinal endoscopy (N/A, 9/4/2019). Home Medications  has a current medication list which includes the following prescription(s): ondansetron, lactulose, omeprazole, isosorbide mononitrate, nitroglycerin, omeprazole, metoprolol tartrate, hydrocodone-acetaminophen, vitamin c, clopidogrel, alprazolam, atorvastatin, docusate, albuterol sulfate hfa, fentanyl, and aspirin. Allergies:  Vancomycin      Review of Systems :      Constitutional: Moderate fatigue, nausea   HEENT: negative for sore mouth, sore throat, hoarseness and voice change; blind in left eye - as noted.   Recurrent headaches  Respiratory: negative for cough , sputum, hemoptysis, chest pain; +dsypnea  Cardiovascular: negative for chest pain, dyspnea, palpitations, orthopnea, PND   Gastrointestinal: Chronic nausea, no major abdomen pain, no melena, no vomiting  Genitourinary: negative for frequency, dysuria, nocturia, urinary incontinence, and hematuria   Integument: negative for rash, skin lesions, bruises.    Hematologic/Lymphatic: negative for easy bruising, bleeding, lymphadenopathy, petechiae and swelling/edema   Endocrine: negative for heat or cold intolerance, tremor, weight changes, change in bowel habits and hair loss   Musculoskeletal: negative for myalgias, arthralgias, pain, joint swelling,and bone pain   Neurological: negative for headaches, dizziness, seizures, weakness, numbness      Physical Examination :       BP (!) 162/70   Pulse 66   Temp 97.2 °F (36.2 °C) (Temporal)   Resp 18   Wt 145 lb 12.8 oz (66.1 kg)   BMI 24.26 kg/m²     Performance Status:Estimated performance status is ECOG 1    General appearance - looks very frail, he is not in distress  Mental status - alert and cooperative   Neck - supple, no significant adenopathy ,trach is central; carotid murmer both sides  Lymphatics - no palpable lymphadenopathy, no hepatosplenomegaly   Chest - clear to auscultation, no wheezes, rales or rhonchi, symmetric air entry   Heart - normal rate, regular rhythm, normal S1, S2, no murmurs, rubs, clicks or gallops   Abdomen - soft, nontender, nondistended, no masses or organomegaly   Neurological - alert, oriented, normal speech, no focal findings or movement disorder noted   Musculoskeletal - no joint tenderness, deformity or swelling   Extremities - peripheral pulses normal, no pedal edema, no clubbing or cyanosis   Skin - normal coloration and turgor, no rashes, no suspicious skin lesions noted    REVIEW OF LABORATORY DATA:    Lab Results   Component Value Date    WBC 11.5 01/24/2020    HGB 14.2 01/24/2020    HCT 43.9 01/24/2020    MCV 94.8 01/24/2020     01/24/2020       Chemistry        Component Value Date/Time     01/24/2020 1458    K 4.4 01/24/2020 1458     01/24/2020 1458    CO2 26 01/24/2020 1458    BUN 15 01/24/2020 1458    CREATININE 1.21 (H) 01/24/2020 1458        Component Value Date/Time    CALCIUM 9.3 01/24/2020 1458    ALKPHOS 67 01/24/2020 1458    AST 13 01/24/2020 1458    ALT 9 01/24/2020 1458    BILITOT 0.53 01/24/2020 1458            PATHOLOGY:      REVIEW OF RADIOLOGICAL RESULTS:    Assessment:    1. Gastric MALTOMA, s/p EMR with postive margins,H. Pylori negative by IHC during scope but positive antigen test in the stool. Treated successfully with H pylori eradication . Treatment started 9/2015    2. EGD showed no active lymphoma, he was reassured. Ct scan was also reassuring   3. Pulm nodules, relatively stable . 4. Multiple comorbidities in the form of CAD, Anemia and CK D  5. Nausea and shortness of breath- follow up with GI  6. CT showed stable fidnings          Plan:   1. We dicussed his previous GI work up. 2. His lab work was reviewed and discussed, his counts are in range, HGB has normalized. 3. We discussed his symptoms and I explained his nausea is symptom of silent GERD, I recommend mattress wedge. 4. We will continue with surveillance. 5. Return in 6 months.      Yola Agrawal MD  Hematologist/Medical Oncologist  Cell: [phone]

## 2020-01-28 ENCOUNTER — TELEPHONE (OUTPATIENT)
Dept: GASTROENTEROLOGY | Age: 75
End: 2020-01-28

## 2020-01-29 ENCOUNTER — OFFICE VISIT (OUTPATIENT)
Dept: GASTROENTEROLOGY | Age: 75
End: 2020-01-29
Payer: MEDICARE

## 2020-01-29 VITALS
DIASTOLIC BLOOD PRESSURE: 60 MMHG | SYSTOLIC BLOOD PRESSURE: 136 MMHG | HEART RATE: 45 BPM | WEIGHT: 146.6 LBS | BODY MASS INDEX: 24.4 KG/M2

## 2020-01-29 PROCEDURE — G8420 CALC BMI NORM PARAMETERS: HCPCS | Performed by: INTERNAL MEDICINE

## 2020-01-29 PROCEDURE — 99214 OFFICE O/P EST MOD 30 MIN: CPT | Performed by: INTERNAL MEDICINE

## 2020-01-29 PROCEDURE — G8427 DOCREV CUR MEDS BY ELIG CLIN: HCPCS | Performed by: INTERNAL MEDICINE

## 2020-01-29 PROCEDURE — G8484 FLU IMMUNIZE NO ADMIN: HCPCS | Performed by: INTERNAL MEDICINE

## 2020-01-29 PROCEDURE — 4040F PNEUMOC VAC/ADMIN/RCVD: CPT | Performed by: INTERNAL MEDICINE

## 2020-01-29 PROCEDURE — 1036F TOBACCO NON-USER: CPT | Performed by: INTERNAL MEDICINE

## 2020-01-29 PROCEDURE — 3017F COLORECTAL CA SCREEN DOC REV: CPT | Performed by: INTERNAL MEDICINE

## 2020-01-29 PROCEDURE — 1123F ACP DISCUSS/DSCN MKR DOCD: CPT | Performed by: INTERNAL MEDICINE

## 2020-01-29 ASSESSMENT — ENCOUNTER SYMPTOMS
RECTAL PAIN: 0
ANAL BLEEDING: 0
ABDOMINAL PAIN: 1
CONSTIPATION: 1
ALLERGIC/IMMUNOLOGIC NEGATIVE: 1
TROUBLE SWALLOWING: 0
DIARRHEA: 0
RESPIRATORY NEGATIVE: 1
BLOOD IN STOOL: 0
BACK PAIN: 1
ABDOMINAL DISTENTION: 0
VOMITING: 0
NAUSEA: 1

## 2020-01-29 NOTE — PROGRESS NOTES
regular rhythm. Pulmonary:      Effort: Pulmonary effort is normal.      Breath sounds: Normal breath sounds. Abdominal:      General: Bowel sounds are normal.      Palpations: Abdomen is soft. Genitourinary:     Rectum: Normal.   Musculoskeletal: Normal range of motion. Skin:     General: Skin is warm. Neurological:      Mental Status: He is alert and oriented to person, place, and time. Deep Tendon Reflexes: Reflexes are normal and symmetric. Assessment:      Patient Active Problem List   Diagnosis    Kyphosis of cervical region    ST elevation myocardial infarction (STEMI) of inferoposterior wall (HCC)    Cardiogenic shock (HCC)    Hyperlipidemia    Smoker    CAD (coronary artery disease)    CARIN [facility]acute kidney injury) ([facility] [address]    S/P CABG (coronary artery bypass graft)    Anemia due to gastrointestinal blood loss    Radiculopathy    Ex-smoker    Nausea and vomiting    GERD (gastroesophageal reflux disease)    Dysphagia    [facility]osa [facility]    Pulmonary infiltrate    Gastritis    Pyogenic granuloma    Esophagitis    Lipoma    Brunner's gland adenoma    Left-sided carotid artery disease ([facility] [address]    Subclavian artery stenosis, left (HCC)    Skull mass    Chest pain due to GERD    Tracy's esophagus    Colon polyp           Plan:      Pt seems to have signs and symptoms consistent with GERD, acid indigestion and heartburns. He was discussed  in detail about some possible life style and dietary modifications. He was stressed about the maintenance  of appropriate weight and effect of obesity contributing to reflux symptoms. Routine exercise was streesed. Avoidance of Caffeine, nicotine and chocolate were explained. Pt was asked to avoid spices grease and fried food. Advices were also given about avoidance of any kind of fast foods, soda pops and high energy drinks.     Pt was advised to place two small block under the head end of the bed which

## 2020-06-23 RX ORDER — ONDANSETRON 4 MG/1
TABLET, ORALLY DISINTEGRATING ORAL
Qty: 90 TABLET | Refills: 3 | Status: SHIPPED | OUTPATIENT
Start: 2020-06-23 | End: 2020-10-16 | Stop reason: SDUPTHER

## 2020-07-17 ENCOUNTER — HOSPITAL ENCOUNTER (OUTPATIENT)
Facility: MEDICAL CENTER | Age: 75
Discharge: HOME | End: 2020-07-17
Payer: MEDICARE

## 2020-07-17 ENCOUNTER — TELEPHONE (OUTPATIENT)
Dept: ONCOLOGY | Age: 75
End: 2020-07-17

## 2020-07-17 ENCOUNTER — OFFICE VISIT (OUTPATIENT)
Dept: ONCOLOGY | Age: 75
End: 2020-07-17
Payer: MEDICARE

## 2020-07-17 VITALS
DIASTOLIC BLOOD PRESSURE: 60 MMHG | SYSTOLIC BLOOD PRESSURE: 139 MMHG | HEART RATE: 52 BPM | BODY MASS INDEX: 24.03 KG/M2 | WEIGHT: 144.4 LBS | TEMPERATURE: 98.7 F

## 2020-07-17 DIAGNOSIS — D50.0 ANEMIA DUE TO GASTROINTESTINAL BLOOD LOSS: ICD-10-CM

## 2020-07-17 DIAGNOSIS — C88.4 MALT (MUCOSA ASSOCIATED LYMPHOID TISSUE) (HCC): ICD-10-CM

## 2020-07-17 LAB
ABSOLUTE EOS #: 0.18 K/UL (ref 0–0.44)
ABSOLUTE IMMATURE GRANULOCYTE: 0.03 K/UL (ref 0–0.3)
ABSOLUTE LYMPH #: 2.56 K/UL (ref 1.1–3.7)
ABSOLUTE MONO #: 0.81 K/UL (ref 0.1–1.2)
ALBUMIN SERPL-MCNC: 4.1 G/DL (ref 3.5–5.2)
ALBUMIN/GLOBULIN RATIO: ABNORMAL (ref 1–2.5)
ALP BLD-CCNC: 62 U/L (ref 40–129)
ALT SERPL-CCNC: 9 U/L (ref 5–41)
ANION GAP SERPL CALCULATED.3IONS-SCNC: 10 MMOL/L (ref 9–17)
AST SERPL-CCNC: 12 U/L
BASOPHILS # BLD: 1 % (ref 0–2)
BASOPHILS ABSOLUTE: 0.1 K/UL (ref 0–0.2)
BILIRUB SERPL-MCNC: 0.42 MG/DL (ref 0.3–1.2)
BUN BLDV-MCNC: 22 MG/DL (ref 8–23)
BUN/CREAT BLD: 15 (ref 9–20)
CALCIUM SERPL-MCNC: 9.3 MG/DL (ref 8.6–10.4)
CHLORIDE BLD-SCNC: 101 MMOL/L (ref 98–107)
CO2: 26 MMOL/L (ref 20–31)
CREAT SERPL-MCNC: 1.46 MG/DL (ref 0.7–1.2)
DIFFERENTIAL TYPE: ABNORMAL
EOSINOPHILS RELATIVE PERCENT: 2 % (ref 1–4)
GFR AFRICAN AMERICAN: 57 ML/MIN
GFR NON-AFRICAN AMERICAN: 47 ML/MIN
GFR SERPL CREATININE-BSD FRML MDRD: ABNORMAL ML/MIN/{1.73_M2}
GFR SERPL CREATININE-BSD FRML MDRD: ABNORMAL ML/MIN/{1.73_M2}
GLUCOSE BLD-MCNC: 131 MG/DL (ref 70–99)
HCT VFR BLD CALC: 38 % (ref 40.7–50.3)
HEMOGLOBIN: 12.4 G/DL (ref 13–17)
IMMATURE GRANULOCYTES: 0 %
LACTATE DEHYDROGENASE: 179 U/L (ref 135–225)
LYMPHOCYTES # BLD: 27 % (ref 24–43)
MCH RBC QN AUTO: 31.2 PG (ref 25.2–33.5)
MCHC RBC AUTO-ENTMCNC: 32.6 G/DL (ref 28.4–34.8)
MCV RBC AUTO: 95.5 FL (ref 82.6–102.9)
MONOCYTES # BLD: 9 % (ref 3–12)
NRBC AUTOMATED: 0 PER 100 WBC
PDW BLD-RTO: 15.1 % (ref 11.8–14.4)
PLATELET # BLD: 215 K/UL (ref 138–453)
PLATELET ESTIMATE: ABNORMAL
PMV BLD AUTO: 9.4 FL (ref 8.1–13.5)
POTASSIUM SERPL-SCNC: 5.2 MMOL/L (ref 3.7–5.3)
RBC # BLD: 3.98 M/UL (ref 4.21–5.77)
RBC # BLD: ABNORMAL 10*6/UL
SEG NEUTROPHILS: 61 % (ref 36–65)
SEGMENTED NEUTROPHILS ABSOLUTE COUNT: 5.9 K/UL (ref 1.5–8.1)
SODIUM BLD-SCNC: 137 MMOL/L (ref 135–144)
TOTAL PROTEIN: 6.6 G/DL (ref 6.4–8.3)
WBC # BLD: 9.6 K/UL (ref 3.5–11.3)
WBC # BLD: ABNORMAL 10*3/UL

## 2020-07-17 PROCEDURE — G8420 CALC BMI NORM PARAMETERS: HCPCS | Performed by: INTERNAL MEDICINE

## 2020-07-17 PROCEDURE — 83615 LACTATE (LD) (LDH) ENZYME: CPT

## 2020-07-17 PROCEDURE — 85025 COMPLETE CBC W/AUTO DIFF WBC: CPT

## 2020-07-17 PROCEDURE — 36415 COLL VENOUS BLD VENIPUNCTURE: CPT

## 2020-07-17 PROCEDURE — 99214 OFFICE O/P EST MOD 30 MIN: CPT | Performed by: INTERNAL MEDICINE

## 2020-07-17 PROCEDURE — G8427 DOCREV CUR MEDS BY ELIG CLIN: HCPCS | Performed by: INTERNAL MEDICINE

## 2020-07-17 PROCEDURE — 1123F ACP DISCUSS/DSCN MKR DOCD: CPT | Performed by: INTERNAL MEDICINE

## 2020-07-17 PROCEDURE — 1036F TOBACCO NON-USER: CPT | Performed by: INTERNAL MEDICINE

## 2020-07-17 PROCEDURE — 4040F PNEUMOC VAC/ADMIN/RCVD: CPT | Performed by: INTERNAL MEDICINE

## 2020-07-17 PROCEDURE — 99211 OFF/OP EST MAY X REQ PHY/QHP: CPT

## 2020-07-17 PROCEDURE — 80053 COMPREHEN METABOLIC PANEL: CPT

## 2020-07-17 NOTE — PROGRESS NOTES
Reason for the visit:   Chief Complaint   Patient presents with    Follow-up     Review status of disease    Discuss Labs    Nausea       Pertinent Clinical Problems/ Treatments:    1. Gastric Maltoma, stage IE,PET scan and bone marrow biopsy negative,  2. H. Pylori negative by histology,but + by stool antigen test  3. CAD, CK D, anemia  4. Treatment: Amoxicillin plus Biaxin plus Protonix, started  September 1 2015  5. Observation/ surveillance     Summary of the case/HPI :    He is a 65-year-old patient who is poor historian and with multiple comorbidities including CAD presented in the last few months with chronic nausea as well as heartburn. Denied any bleeding or melena, he had no fever or chills or night sweats. 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 He was referred to [facility] for EUS and Endo mucosal resection which was done in June. Final path consisted with the same histology and margins were positive. Tested for H. pylori and that was positive. The decision was to treat him with anti-H. pylori treatment   He attained good remission, and was observed since then. 12/2017 he developed amaurosis fugax, arthrosclerotic plaque on left carotid artery, plan is carotid endartectomy. 08/2018 EGD, 11/2018 colonoscopy, inflammation was seen in the stomach (mild chronic gastritis)   12/2018 he presented in ER with chest pains, was started on medication, cardiology follow up scheduled. 06/2019 his nausea and shortness of breath have progressively worsened, schedule next available appointment with GI and CT. INTERIM HISTORY: The patent is here for a follow up for MALT. He has put wedge under his mattress but continues to wake up with nausea and is sleeping in chair, it resolves with standing up. His appetite is poor and worries about weight loss.  He was in house 06/08/2020 and underwent CT. He underwent stress test yesterday. Past Medical History   has a past medical history of Arthritis, Tracy's esophagus, Brunner's gland adenoma, CAD (coronary artery disease), Cancer ([facility] [address] Colon polyp, Dysphagia, Esophagitis, Gastritis, GERD (gastroesophageal reflux disease), Hx of blood clots, Hyperlipidemia, Hypertension, Lipoma, Myocardial infarction ([address] Nausea & vomiting, Pyogenic granuloma, and Vascular abnormality. Surgical History   has a past surgical history that includes Cardiac catheterization; Carotid endarterectomy; Cervical discectomy; Lumbar disc surgery; arthroplasty; arthroplasty; Tonsillectomy; Inguinal hernia repair; Brain tumor excision (2008); Cervical spine surgery (6-25-13); Coronary artery bypass graft (3/6/2014); Upper gastrointestinal endoscopy (4/30/2015); Upper gastrointestinal endoscopy (5/18/16); Upper gastrointestinal endoscopy (01/25/2017); joint replacement; other surgical history (Left, 12/12/2017); Upper gastrointestinal endoscopy (08/08/2018); Upper gastrointestinal endoscopy (8/8/2018); Colonoscopy (11/21/2018); Colonoscopy (N/A, 11/21/2018); Endoscopy, colon, diagnostic; Upper gastrointestinal endoscopy (09/04/2019); and Upper gastrointestinal endoscopy (N/A, 9/4/2019). Home Medications  has a current medication list which includes the following prescription(s): ondansetron, lactulose, omeprazole, isosorbide mononitrate, nitroglycerin, omeprazole, metoprolol tartrate, hydrocodone-acetaminophen, vitamin c, clopidogrel, alprazolam, docusate, albuterol sulfate hfa, fentanyl, and aspirin. Allergies:  Vancomycin      Review of Systems :      Constitutional: Moderate fatigue, nausea   HEENT: negative for sore mouth, sore throat, hoarseness and voice change; blind in left eye - as noted.   Recurrent headaches  Respiratory: negative for cough , sputum, hemoptysis, chest pain; +dsypnea  Cardiovascular: negative for chest pain, 1443    BUN 22 07/17/2020 1443    CREATININE 1.46 (H) 07/17/2020 1443        Component Value Date/Time    CALCIUM 9.3 07/17/2020 1443    ALKPHOS 62 07/17/2020 1443    AST 12 07/17/2020 1443    ALT 9 07/17/2020 1443    BILITOT 0.42 07/17/2020 1443            PATHOLOGY:      REVIEW OF RADIOLOGICAL RESULTS:  06/08/2020 CT     1.  No acute intrathoracic abnormality. 2.  Centrilobular emphysema, similar to prior study. 3.  Extensive degenerative changes in the lower thoracic and upper lumbar spine. Assessment:    1. Gastric MALTOMA, s/p EMR with postive margins,H. Pylori negative by IHC during scope but positive antigen test in the stool. Treated successfully with H pylori eradication . Treatment started 9/2015    2. EGD showed no active lymphoma, he was reassured. Ct scan was also reassuring   3. Pulm nodules, relatively stable . 4. Multiple comorbidities in the form of CAD, Anemia and CK D  5. Nausea and shortness of breath- follow up with GI  6. CT showed stable fidnings      Plan:   1. We reviewed his CT showing no evidence of recurrent disease. 2. His lab work was reviewed, his counts are stable in range, his creatinine remains elevated with CKD. 3. I assured him he remains in remission. 4. I am refilling his Zofran. 5. He will follow up with GI, consider surgical intervention for ongoing nausea. 6. We will continue with surveillance unchanged. 7. Return in 6 months.      SHIMA OWENS [facility] MD Tanja  Hematologist/Medical Oncologist  Cell: [phone]

## 2020-07-17 NOTE — TELEPHONE ENCOUNTER
RAMONA ARRIVES AMBULATORY FOR MD VISIT  DR Peoples Friend IN TO SEE PATIENT  ORDERS RECEIVED  RV 6 MONTHS W/ LABS  LABS CDP CMP LD DUE JAN/2021  MD VISIT DUE JAN/2021  AVS PRINTED AND GIVEN TO PATIENT W/ INSTRUCTION  AVS  PRINTED AND PUT IN CALL FOLDER  PATIENT DISCHARGED AMBULATORY

## 2020-07-28 ENCOUNTER — TELEPHONE (OUTPATIENT)
Dept: GASTROENTEROLOGY | Age: 75
End: 2020-07-28

## 2020-07-29 ENCOUNTER — OFFICE VISIT (OUTPATIENT)
Dept: GASTROENTEROLOGY | Age: 75
End: 2020-07-29
Payer: MEDICARE

## 2020-07-29 VITALS — WEIGHT: 143.2 LBS | BODY MASS INDEX: 23.83 KG/M2

## 2020-07-29 PROCEDURE — G8420 CALC BMI NORM PARAMETERS: HCPCS | Performed by: INTERNAL MEDICINE

## 2020-07-29 PROCEDURE — 4040F PNEUMOC VAC/ADMIN/RCVD: CPT | Performed by: INTERNAL MEDICINE

## 2020-07-29 PROCEDURE — 1123F ACP DISCUSS/DSCN MKR DOCD: CPT | Performed by: INTERNAL MEDICINE

## 2020-07-29 PROCEDURE — 1036F TOBACCO NON-USER: CPT | Performed by: INTERNAL MEDICINE

## 2020-07-29 PROCEDURE — 99214 OFFICE O/P EST MOD 30 MIN: CPT | Performed by: INTERNAL MEDICINE

## 2020-07-29 PROCEDURE — G8427 DOCREV CUR MEDS BY ELIG CLIN: HCPCS | Performed by: INTERNAL MEDICINE

## 2020-07-29 ASSESSMENT — ENCOUNTER SYMPTOMS
ANAL BLEEDING: 0
VOMITING: 1
NAUSEA: 1
TROUBLE SWALLOWING: 1
BLOOD IN STOOL: 0
RESPIRATORY NEGATIVE: 1
ABDOMINAL DISTENTION: 0
CONSTIPATION: 1
BACK PAIN: 1
RECTAL PAIN: 0
ABDOMINAL PAIN: 1
DIARRHEA: 0
ALLERGIC/IMMUNOLOGIC NEGATIVE: 1

## 2020-07-29 NOTE — PROGRESS NOTES
Hyperlipidemia     Hypertension     Lipoma     Myocardial infarction ([facility] [address]     STRESS DONE    Nausea & vomiting     Pyogenic granuloma     Vascular abnormality     CAROTID       Past Surgical History:   Procedure Laterality Date    ARTHROPLASTY      RIGHT KNEE X3-INFECTION    ARTHROPLASTY      LEFT KNEE    BRAIN TUMOR EXCISION  2008    excision    CARDIAC CATHETERIZATION      CAROTID ENDARTERECTOMY      LEFT    CERVICAL DISCECTOMY      CERVICAL SPINE SURGERY  6-25-13    ACF 3-4, 4-5    COLONOSCOPY  11/21/2018    hyperplastic polyp    COLONOSCOPY N/A 11/21/2018    COLONOSCOPY POLYPECTOMY SNARE/COLD BIOPSY performed by Cortney Camp MD at [place] [id number]  3/6/2014    CABG X 3    ENDOSCOPY, COLON, DIAGNOSTIC      INGUINAL HERNIA REPAIR      LEFT    JOINT REPLACEMENT      Bilat knees    LUMBAR DISC SURGERY      HARDWARE    OTHER SURGICAL HISTORY Left 12/12/2017     1) Arch aortography 2) Selective left subclavian angiography 3) Left subclavian artery angioplasty with 7 mm x 40 mm and 8 mm x 40 mm [place] balloons     TONSILLECTOMY      UPPER GASTROINTESTINAL ENDOSCOPY  4/30/2015    UPPER GASTROINTESTINAL ENDOSCOPY  5/18/16    lipomatous lump; esophagitis; pyogenic granuloma; mild gastritis    UPPER GASTROINTESTINAL ENDOSCOPY  01/25/2017    lipoma; prominet Brunner's gland; gastritis    UPPER GASTROINTESTINAL ENDOSCOPY  08/08/2018    CASTILLO'S    UPPER GASTROINTESTINAL ENDOSCOPY  8/8/2018    EGD BIOPSY performed by Cortney Camp MD at [address]  09/04/2019    EGD BIOPSY     UPPER GASTROINTESTINAL ENDOSCOPY N/A 9/4/2019    EGD BIOPSY performed by [facility] Camp MD at [facility]:    Current Outpatient Medications:     ondansetron (ZOFRAN-ODT) 4 MG disintegrating tablet, DISSOLVE 1 TABLET ON THE TONGUE EVERY 8 HOURS AS NEEDED FOR NAUSEA OR VOMITING, Disp: 90 tablet, Rfl: 3    lactulose (CHRONULAC) 10 GM/15ML solution, TAKE 30ML BY MOUTH THREE TIMES DAILY AS NEEDED FOR CONSTIPATION, Disp: 7080 mL, Rfl: 3    isosorbide mononitrate (IMDUR) 30 MG extended release tablet, Take 1 tablet by mouth daily, Disp: 30 tablet, Rfl: 3    nitroGLYCERIN (NITROSTAT) 0.4 MG SL tablet, up to max of 3 total doses. If no relief after 1 dose, call 911., Disp: 25 tablet, Rfl: 3    omeprazole (PRILOSEC) 20 MG delayed release capsule, Take 1 capsule by mouth 2 times daily, Disp: 120 capsule, Rfl: 3    metoprolol tartrate (LOPRESSOR) 25 MG tablet, Take 0.25 tablets by mouth 2 times daily (Patient taking differently: Take 12.5 mg by mouth daily ), Disp: 60 tablet, Rfl: 3    HYDROcodone-acetaminophen (NORCO)  MG per tablet, Take 1 tablet by mouth every 4 hours as needed for Pain ., Disp: , Rfl:     Ascorbic Acid (VITAMIN C) 1000 MG tablet, Take 1,000 mg by mouth daily, Disp: , Rfl:     clopidogrel (PLAVIX) 75 MG tablet, Take 1 tablet by mouth daily, Disp: 30 tablet, Rfl: 6    ALPRAZolam (XANAX PO), Take 0.5 tablets by mouth 2 times daily as needed , Disp: , Rfl:     docusate (COLACE, DULCOLAX) 100 MG CAPS, Take 100 mg by mouth 2 times daily (Patient taking differently: Take 100 mg by mouth 2 times daily PRN), Disp: 60 capsule, Rfl: 1    albuterol (PROVENTIL HFA;VENTOLIN HFA) 108 (90 BASE) MCG/ACT inhaler, Inhale 2 puffs into the lungs every 6 hours as needed for Wheezing., Disp: , Rfl:     fentaNYL (DURAGESIC) 50 MCG/HR, Place 1 patch onto the skin every other day  ., Disp: , Rfl:     aspirin 81 MG chewable tablet, Take 1 tablet by mouth daily. , Disp: 30 tablet, Rfl: 11    ALLERGIES:   Allergies   Allergen Reactions    Vancomycin      AFFECTS KIDNEYS         FAMILY HISTORY:       Problem Relation Age of Onset    Lung Cancer Father     Stroke Maternal Grandmother          SOCIAL HISTORY:   Social History     Socioeconomic History    Marital status:      Spouse name: Not on file    Number of children: Not on file    Years of education: Not on file    Highest education level: Not on file   Occupational History    Not on file   Social Needs    Financial resource strain: Not on file    Food insecurity     Worry: Not on file     Inability: Not on file    Transportation needs     Medical: Not on file     Non-medical: Not on file   Tobacco Use    Smoking status: Former Smoker     Packs/day: 0.25     Years: 30.00     Pack years: 7.50     Types: Cigarettes     Last attempt to quit: 2018     Years since quittin.2    Smokeless tobacco: Former User    Tobacco comment: QUIT CIGARETTES/SMOKES CIGARS   Substance and Sexual Activity    Alcohol use: No     Comment: QUIT; H/O ETOH abuse    Drug use: No    Sexual activity: Not on file   Lifestyle    Physical activity     Days per week: Not on file     Minutes per session: Not on file    Stress: Not on file   Relationships    Social connections     Talks on phone: Not on file     Gets together: Not on file     Attends Orthodox service: Not on file     Active member of club or organization: Not on file     Attends meetings of clubs or organizations: Not on file     Relationship status: Not on file    Intimate partner violence     Fear of current or ex partner: Not on file     Emotionally abused: Not on file     Physically abused: Not on file     Forced sexual activity: Not on file   Other Topics Concern    Not on file   Social History Narrative    Not on file         REVIEW OF SYSTEMS:         Review of Systems   Constitutional: Positive for fatigue. HENT: Positive for dental problem and trouble swallowing (some pills). Eyes: Positive for visual disturbance. States eye issue started this morning. Hx of cat sx in April   Respiratory: Negative. Cardiovascular: Negative. Gastrointestinal: Positive for abdominal pain, constipation (ok with meds), nausea (every morning) and vomiting.  Negative for abdominal distention, anal bleeding, blood in stool, diarrhea and rectal pain. Endocrine: Negative. Genitourinary: Negative. Musculoskeletal: Positive for arthralgias and back pain. Skin: Negative. Allergic/Immunologic: Negative. Neurological: Positive for dizziness, weakness and light-headedness. Hematological: Bruises/bleeds easily. Psychiatric/Behavioral: Positive for sleep disturbance. The patient is nervous/anxious. Reviewed and agree  PHYSICAL EXAMINATION: Vital signs reviewed per the nursing documentation. Wt 143 lb 3.2 oz (65 kg)   BMI 23.83 kg/m²   Body mass index is 23.83 kg/m². Physical Exam  Nursing note reviewed. Constitutional:       Appearance: He is well-developed. Comments: Anxious    HENT:      Head: Normocephalic and atraumatic. Eyes:      Conjunctiva/sclera: Conjunctivae normal.      Pupils: Pupils are equal, round, and reactive to light. Neck:      Musculoskeletal: Normal range of motion and neck supple. Cardiovascular:      Rate and Rhythm: Normal rate and regular rhythm. Pulmonary:      Effort: Pulmonary effort is normal.      Breath sounds: Normal breath sounds. Abdominal:      General: Bowel sounds are normal.      Palpations: Abdomen is soft. Comments: NON TENDER, NON DISTENTED  LIVER SPLEEN AND HERNIAS ARE NOT  PALPABLE  BOWEL SOUNDS ARE POSITIVE      Genitourinary:     Rectum: Normal.   Musculoskeletal: Normal range of motion. Skin:     General: Skin is warm. Neurological:      Mental Status: He is alert and oriented to person, place, and time. Deep Tendon Reflexes: Reflexes are normal and symmetric.            LABORATORY DATA: Reviewed  Lab Results   Component Value Date    WBC 9.6 07/17/2020    HGB 12.4 (L) 07/17/2020    HCT 38.0 (L) 07/17/2020    MCV 95.5 07/17/2020     07/17/2020     07/17/2020    K 5.2 07/17/2020     07/17/2020    CO2 26 07/17/2020    BUN 22 07/17/2020    CREATININE 1.46 (H) 07/17/2020    LABALBU 4.1 07/17/2020    BILITOT 0.42 07/17/2020    ALKPHOS 62 07/17/2020    AST 12 07/17/2020    ALT 9 07/17/2020    INR 1.1 12/12/2017         Lab Results   Component Value Date    RBC 3.98 (L) 07/17/2020    HGB 12.4 (L) 07/17/2020    MCV 95.5 07/17/2020    MCH 31.2 07/17/2020    MCHC 32.6 07/17/2020    RDW 15.1 (H) 07/17/2020    MPV 9.4 07/17/2020    BASOPCT 1 07/17/2020    LYMPHSABS 2.56 07/17/2020    MONOSABS 0.81 07/17/2020    NEUTROABS 5.90 07/17/2020    EOSABS 0.18 07/17/2020    BASOSABS 0.10 07/17/2020         DIAGNOSTIC TESTING:     No results found. Assessment  1. Bilious vomiting with nausea    2. Ex-smoker    3. Gastroesophageal reflux disease, esophagitis presence not specified    4. Tracy's esophagus without dysplasia        Plan    Continue PPI therapy    Pt was discussed in detail about the possible side effects of proton pump inhibiter therapy. He was explained about the possibility of calcium and magnesium malabsorption and was advised to start taking calcium supplements with Vit D. Some over the counter regimens were explained to patient. Some dietary advices were also given. He has verbalized understanding and agreement to this. PRN Zofran      Pt seems to have signs and symptoms consistent with GERD, acid indigestion and heartburns. He was discussed  in detail about some possible life style and dietary modifications. He was stressed about the maintenance  of appropriate weight and effect of obesity contributing to reflux symptoms. Routine exercise was streesed. Avoidance of Caffeine, nicotine and chocolate were explained. Pt was asked to avoid spices grease and fried food. Advices were also given about avoidance of any kind of fast foods, soda pops and high energy drinks. Pt was advised to place two small block under the head end of the bed which may help with night time reflux.  Was advised not to eat any thin at least 2-3 hrs before going to bed and walk especially after dinner    Pt has verbalized understanding and agreement Jenae Vázquez MD, [facility]  Board Certified in Gastroenterology and [address] [place] [address] Gastroenterology  Office #: [phone]

## 2020-10-16 RX ORDER — ONDANSETRON 4 MG/1
TABLET, ORALLY DISINTEGRATING ORAL
Qty: 90 TABLET | Refills: 3 | OUTPATIENT
Start: 2020-10-16 | End: 2021-01-11 | Stop reason: SDUPTHER

## 2020-10-16 NOTE — TELEPHONE ENCOUNTER
RAMONA'S WIFE PHONED REQUESTING REFILL OF ZOFRAN. SHE SOUNDS SOMEWHAT PANIC AND PT IS OUT OF MED CURRENTLY. CALLED IN REFILL, SPOKE WITH VIRIDIANA SETH FOLLOW UP 01/11/2021.

## 2020-12-01 RX ORDER — OMEPRAZOLE 20 MG/1
CAPSULE, DELAYED RELEASE ORAL
Qty: 180 CAPSULE | Refills: 1 | Status: SHIPPED | OUTPATIENT
Start: 2020-12-01 | End: 2021-12-13

## 2021-01-06 DIAGNOSIS — C88.4 MALT (MUCOSA ASSOCIATED LYMPHOID TISSUE) (HCC): Primary | ICD-10-CM

## 2021-01-07 ENCOUNTER — HOSPITAL ENCOUNTER (OUTPATIENT)
Facility: MEDICAL CENTER | Age: 76
End: 2021-01-07
Payer: MEDICARE

## 2021-01-11 ENCOUNTER — TELEPHONE (OUTPATIENT)
Dept: ONCOLOGY | Age: 76
End: 2021-01-11

## 2021-01-11 ENCOUNTER — OFFICE VISIT (OUTPATIENT)
Dept: ONCOLOGY | Age: 76
End: 2021-01-11
Payer: MEDICARE

## 2021-01-11 ENCOUNTER — HOSPITAL ENCOUNTER (OUTPATIENT)
Facility: MEDICAL CENTER | Age: 76
Discharge: HOME | End: 2021-01-11
Payer: MEDICARE

## 2021-01-11 VITALS
BODY MASS INDEX: 23.9 KG/M2 | TEMPERATURE: 97.7 F | RESPIRATION RATE: 16 BRPM | HEART RATE: 52 BPM | SYSTOLIC BLOOD PRESSURE: 170 MMHG | DIASTOLIC BLOOD PRESSURE: 80 MMHG | WEIGHT: 143.6 LBS

## 2021-01-11 DIAGNOSIS — C88.4 MALT (MUCOSA ASSOCIATED LYMPHOID TISSUE) (HCC): ICD-10-CM

## 2021-01-11 DIAGNOSIS — D50.0 ANEMIA DUE TO GASTROINTESTINAL BLOOD LOSS: ICD-10-CM

## 2021-01-11 LAB
ABSOLUTE EOS #: 0.1 K/UL (ref 0–0.44)
ABSOLUTE IMMATURE GRANULOCYTE: 0.04 K/UL (ref 0–0.3)
ABSOLUTE LYMPH #: 2.57 K/UL (ref 1.1–3.7)
ABSOLUTE MONO #: 0.72 K/UL (ref 0.1–1.2)
ALBUMIN SERPL-MCNC: 3.9 G/DL (ref 3.5–5.2)
ALBUMIN/GLOBULIN RATIO: ABNORMAL (ref 1–2.5)
ALP BLD-CCNC: 81 U/L (ref 40–129)
ALT SERPL-CCNC: 9 U/L (ref 5–41)
ANION GAP SERPL CALCULATED.3IONS-SCNC: 12 MMOL/L (ref 9–17)
AST SERPL-CCNC: 15 U/L
BASOPHILS # BLD: 1 % (ref 0–2)
BASOPHILS ABSOLUTE: 0.11 K/UL (ref 0–0.2)
BILIRUB SERPL-MCNC: 0.53 MG/DL (ref 0.3–1.2)
BUN BLDV-MCNC: 21 MG/DL (ref 8–23)
BUN/CREAT BLD: 15 (ref 9–20)
CALCIUM SERPL-MCNC: 9.1 MG/DL (ref 8.6–10.4)
CHLORIDE BLD-SCNC: 99 MMOL/L (ref 98–107)
CO2: 22 MMOL/L (ref 20–31)
CREAT SERPL-MCNC: 1.42 MG/DL (ref 0.7–1.2)
DIFFERENTIAL TYPE: ABNORMAL
EOSINOPHILS RELATIVE PERCENT: 1 % (ref 1–4)
GFR AFRICAN AMERICAN: 59 ML/MIN
GFR NON-AFRICAN AMERICAN: 48 ML/MIN
GFR SERPL CREATININE-BSD FRML MDRD: ABNORMAL ML/MIN/{1.73_M2}
GFR SERPL CREATININE-BSD FRML MDRD: ABNORMAL ML/MIN/{1.73_M2}
GLUCOSE BLD-MCNC: 115 MG/DL (ref 70–99)
HCT VFR BLD CALC: 38.8 % (ref 40.7–50.3)
HEMOGLOBIN: 12.4 G/DL (ref 13–17)
IMMATURE GRANULOCYTES: 0 %
LACTATE DEHYDROGENASE: 182 U/L (ref 135–225)
LYMPHOCYTES # BLD: 25 % (ref 24–43)
MCH RBC QN AUTO: 30.5 PG (ref 25.2–33.5)
MCHC RBC AUTO-ENTMCNC: 32 G/DL (ref 28.4–34.8)
MCV RBC AUTO: 95.3 FL (ref 82.6–102.9)
MONOCYTES # BLD: 7 % (ref 3–12)
NRBC AUTOMATED: 0 PER 100 WBC
PDW BLD-RTO: 15.4 % (ref 11.8–14.4)
PLATELET # BLD: 222 K/UL (ref 138–453)
PLATELET ESTIMATE: ABNORMAL
PMV BLD AUTO: 10.1 FL (ref 8.1–13.5)
POTASSIUM SERPL-SCNC: 4.6 MMOL/L (ref 3.7–5.3)
RBC # BLD: 4.07 M/UL (ref 4.21–5.77)
RBC # BLD: ABNORMAL 10*6/UL
SEG NEUTROPHILS: 66 % (ref 36–65)
SEGMENTED NEUTROPHILS ABSOLUTE COUNT: 6.62 K/UL (ref 1.5–8.1)
SODIUM BLD-SCNC: 133 MMOL/L (ref 135–144)
TOTAL PROTEIN: 6.6 G/DL (ref 6.4–8.3)
WBC # BLD: 10.2 K/UL (ref 3.5–11.3)
WBC # BLD: ABNORMAL 10*3/UL

## 2021-01-11 PROCEDURE — 99211 OFF/OP EST MAY X REQ PHY/QHP: CPT | Performed by: INTERNAL MEDICINE

## 2021-01-11 PROCEDURE — G8427 DOCREV CUR MEDS BY ELIG CLIN: HCPCS | Performed by: INTERNAL MEDICINE

## 2021-01-11 PROCEDURE — G8484 FLU IMMUNIZE NO ADMIN: HCPCS | Performed by: INTERNAL MEDICINE

## 2021-01-11 PROCEDURE — 85025 COMPLETE CBC W/AUTO DIFF WBC: CPT

## 2021-01-11 PROCEDURE — 36415 COLL VENOUS BLD VENIPUNCTURE: CPT

## 2021-01-11 PROCEDURE — G8420 CALC BMI NORM PARAMETERS: HCPCS | Performed by: INTERNAL MEDICINE

## 2021-01-11 PROCEDURE — 1123F ACP DISCUSS/DSCN MKR DOCD: CPT | Performed by: INTERNAL MEDICINE

## 2021-01-11 PROCEDURE — 4040F PNEUMOC VAC/ADMIN/RCVD: CPT | Performed by: INTERNAL MEDICINE

## 2021-01-11 PROCEDURE — 99214 OFFICE O/P EST MOD 30 MIN: CPT | Performed by: INTERNAL MEDICINE

## 2021-01-11 PROCEDURE — 80053 COMPREHEN METABOLIC PANEL: CPT

## 2021-01-11 PROCEDURE — 1036F TOBACCO NON-USER: CPT | Performed by: INTERNAL MEDICINE

## 2021-01-11 PROCEDURE — 83615 LACTATE (LD) (LDH) ENZYME: CPT

## 2021-01-11 RX ORDER — ONDANSETRON 4 MG/1
TABLET, ORALLY DISINTEGRATING ORAL
Qty: 90 TABLET | Refills: 3 | Status: SHIPPED | OUTPATIENT
Start: 2021-01-11 | End: 2021-05-10

## 2021-01-11 RX ORDER — CITALOPRAM 10 MG/1
10 TABLET ORAL DAILY
Qty: 30 TABLET | Refills: 3 | Status: SHIPPED | OUTPATIENT
Start: 2021-01-11 | End: 2021-02-24

## 2021-01-11 NOTE — PROGRESS NOTES
Reason for the visit:   Chief Complaint   Patient presents with    Follow-up    Discuss Labs    Nausea     mostly in mormings    Medication Refill       Pertinent Clinical Problems/ Treatments:    1. Gastric Maltoma, stage IE,PET scan and bone marrow biopsy negative,  2. H. Pylori negative by histology,but + by stool antigen test  3. CAD, CK D, anemia  4. Treatment: Amoxicillin plus Biaxin plus Protonix, started  September 1 2015  5. Observation/ surveillance     Summary of the case/HPI :    He is a 69-year-old patient who is poor historian and with multiple comorbidities including CAD presented in the last few months with chronic nausea as well as heartburn. Denied any bleeding or melena, he had no fever or chills or night sweats. 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 He was referred to [facility] for EUS and Endo mucosal resection which was done in June. Final path consisted with the same histology and margins were positive. Tested for H. pylori and that was positive. The decision was to treat him with anti-H. pylori treatment   He attained good remission, and was observed since then. 12/2017 he developed amaurosis fugax, arthrosclerotic plaque on left carotid artery, plan is carotid endartectomy. 08/2018 EGD, 11/2018 colonoscopy, inflammation was seen in the stomach (mild chronic gastritis)   12/2018 he presented in ER with chest pains, was started on medication, cardiology follow up scheduled. 06/2019 his nausea and shortness of breath have progressively worsened, schedule next available appointment with GI and CT. INTERIM HISTORY: The patent is here for a follow up for MALT. His nausea persists, he has follow up with GI in a few weeks with EGD likely planned. He no longer smokes cigarettes but does smoke cigar very occasionally.  He feels his fatigue has increased and motivation to do daily tasks is very poor. He would like to receive COVID vaccination. Past Medical History   has a past medical history of Arthritis, Tracy's esophagus, Brunner's gland adenoma, CAD (coronary artery disease), Cancer ([facility] [address] Colon polyp, Dysphagia, Esophagitis, Gastritis, GERD (gastroesophageal reflux disease), Hx of blood clots, Hyperlipidemia, Hypertension, Lipoma, Myocardial infarction ([facility] [address] Nausea & vomiting, Pyogenic granuloma, and Vascular abnormality. Surgical History   has a past surgical history that includes Cardiac catheterization; Carotid endarterectomy; Cervical discectomy; Lumbar disc surgery; arthroplasty; arthroplasty; Tonsillectomy; Inguinal hernia repair; Brain tumor excision (2008); Cervical spine surgery (6-25-13); Coronary artery bypass graft (3/6/2014); Upper gastrointestinal endoscopy (4/30/2015); Upper gastrointestinal endoscopy (5/18/16); Upper gastrointestinal endoscopy (01/25/2017); joint replacement; other surgical history (Left, 12/12/2017); Upper gastrointestinal endoscopy (08/08/2018); Upper gastrointestinal endoscopy (8/8/2018); Colonoscopy (11/21/2018); Colonoscopy (N/A, 11/21/2018); Endoscopy, colon, diagnostic; Upper gastrointestinal endoscopy (09/04/2019); and Upper gastrointestinal endoscopy (N/A, 9/4/2019). Home Medications  has a current medication list which includes the following prescription(s): omeprazole, ondansetron, metoprolol tartrate, hydrocodone-acetaminophen, vitamin c, alprazolam, docusate, albuterol sulfate hfa, fentanyl, aspirin, and nitroglycerin. Allergies:  Vancomycin      Review of Systems :      Constitutional: Moderate fatigue, nausea; depression   HEENT: negative for sore mouth, sore throat, hoarseness and voice change; blind in left eye - as noted.   Recurrent headaches  Respiratory: negative for cough , sputum, hemoptysis, chest pain; +dsypnea  Cardiovascular: negative for chest pain, dyspnea, 1350    K 4.6 01/11/2021 1350    CL 99 01/11/2021 1350    CO2 22 01/11/2021 1350    BUN 21 01/11/2021 1350    CREATININE 1.42 (H) 01/11/2021 1350        Component Value Date/Time    CALCIUM 9.1 01/11/2021 1350    ALKPHOS 81 01/11/2021 1350    AST 15 01/11/2021 1350    ALT 9 01/11/2021 1350    BILITOT 0.53 01/11/2021 1350            PATHOLOGY:      REVIEW OF RADIOLOGICAL RESULTS:      1.  No acute intrathoracic abnormality. 2.  Centrilobular emphysema, similar to prior study. 3.  Extensive degenerative changes in the lower thoracic and upper lumbar spine. Assessment:    1. Gastric MALTOMA, s/p EMR with postive margins,H. Pylori negative by IHC during scope but positive antigen test in the stool. Treated successfully with H pylori eradication . Treatment started 9/2015    2. EGD showed no active lymphoma, he was reassured. Ct scan was also reassuring   3. Pulm nodules, relatively stable . 4. Multiple comorbidities in the form of CAD, Anemia and CK D  5. Nausea and shortness of breath- follow up with GI  6. CT showed stable fidnings      Plan:   1. His lab work was reviewed, his counts are in range. 2. We discussed his persistent nausea, I am refilling his Zofran, he has tried licorice to manage and may continue to do so. 3. He is exhibiting sings of depression with lack of motivation, I am ordering Celexa to be taken before bed and I asked him to monitor. 4. His disease remains controlled and we will continue with surveillance. 5. I asked him to keep appointment with GI and update us on findings. 6. I recommend he receive COVID vaccination when available. 7. Return in 6 months.      SHIMA MORAMurray-[facility]ow[facility] MD [place]  Hematologist/Medical Oncologist  Cell: [phone]

## 2021-02-24 ENCOUNTER — APPOINTMENT (OUTPATIENT)
Dept: CT IMAGING | Facility: CLINIC | Age: 76
DRG: 291 | End: 2021-02-24
Payer: MEDICARE

## 2021-02-24 ENCOUNTER — HOSPITAL ENCOUNTER (INPATIENT)
Age: 76
LOS: 2 days | Discharge: HOME | DRG: 291 | End: 2021-02-26
Attending: EMERGENCY MEDICINE | Admitting: INTERNAL MEDICINE
Payer: MEDICARE

## 2021-02-24 ENCOUNTER — APPOINTMENT (OUTPATIENT)
Dept: GENERAL RADIOLOGY | Facility: CLINIC | Age: 76
DRG: 291 | End: 2021-02-24
Payer: MEDICARE

## 2021-02-24 DIAGNOSIS — R07.9 CHEST PAIN, UNSPECIFIED TYPE: ICD-10-CM

## 2021-02-24 DIAGNOSIS — I50.9 CONGESTIVE HEART FAILURE, UNSPECIFIED HF CHRONICITY, UNSPECIFIED HEART FAILURE TYPE (HCC): Primary | ICD-10-CM

## 2021-02-24 LAB
ABSOLUTE EOS #: 0.1 K/UL (ref 0–0.4)
ABSOLUTE IMMATURE GRANULOCYTE: ABNORMAL K/UL (ref 0–0.3)
ABSOLUTE LYMPH #: 1.8 K/UL (ref 1–4.8)
ABSOLUTE MONO #: 0.7 K/UL (ref 0.1–1.2)
ALBUMIN SERPL-MCNC: 4.2 G/DL (ref 3.5–5.2)
ALBUMIN/GLOBULIN RATIO: 1.6 (ref 1–2.5)
ALP BLD-CCNC: 85 U/L (ref 40–129)
ALT SERPL-CCNC: 7 U/L (ref 5–41)
ANION GAP SERPL CALCULATED.3IONS-SCNC: 9 MMOL/L (ref 9–17)
AST SERPL-CCNC: 18 U/L
BASOPHILS # BLD: 1 % (ref 0–2)
BASOPHILS ABSOLUTE: 0.1 K/UL (ref 0–0.2)
BILIRUB SERPL-MCNC: 0.6 MG/DL (ref 0.3–1.2)
BNP INTERPRETATION: ABNORMAL
BUN BLDV-MCNC: 19 MG/DL (ref 8–23)
BUN/CREAT BLD: ABNORMAL (ref 9–20)
CALCIUM SERPL-MCNC: 9.6 MG/DL (ref 8.6–10.4)
CHLORIDE BLD-SCNC: 102 MMOL/L (ref 98–107)
CO2: 26 MMOL/L (ref 20–31)
CREAT SERPL-MCNC: 1.4 MG/DL (ref 0.7–1.2)
D-DIMER QUANTITATIVE: 0.82 MG/L FEU
DIFFERENTIAL TYPE: ABNORMAL
EOSINOPHILS RELATIVE PERCENT: 1 % (ref 1–4)
GFR AFRICAN AMERICAN: 60 ML/MIN
GFR NON-AFRICAN AMERICAN: 49 ML/MIN
GFR SERPL CREATININE-BSD FRML MDRD: ABNORMAL ML/MIN/{1.73_M2}
GFR SERPL CREATININE-BSD FRML MDRD: ABNORMAL ML/MIN/{1.73_M2}
GLUCOSE BLD-MCNC: 110 MG/DL (ref 70–99)
HCT VFR BLD CALC: 37.8 % (ref 41–53)
HEMOGLOBIN: 12.5 G/DL (ref 13.5–17.5)
IMMATURE GRANULOCYTES: ABNORMAL %
INR BLD: 1.1
LYMPHOCYTES # BLD: 20 % (ref 24–44)
MCH RBC QN AUTO: 31.4 PG (ref 26–34)
MCHC RBC AUTO-ENTMCNC: 32.9 G/DL (ref 31–37)
MCV RBC AUTO: 95.3 FL (ref 80–100)
MONOCYTES # BLD: 7 % (ref 2–11)
NRBC AUTOMATED: ABNORMAL PER 100 WBC
PDW BLD-RTO: 16.5 % (ref 12.5–15.4)
PLATELET # BLD: 214 K/UL (ref 140–450)
PLATELET ESTIMATE: ABNORMAL
PMV BLD AUTO: 8.1 FL (ref 6–12)
POTASSIUM SERPL-SCNC: 4.7 MMOL/L (ref 3.7–5.3)
PRO-BNP: 8747 PG/ML
PROTHROMBIN TIME: 11.9 SEC (ref 9.4–12.6)
RBC # BLD: 3.97 M/UL (ref 4.5–5.9)
RBC # BLD: ABNORMAL 10*6/UL
SARS-COV-2, RAPID: NOT DETECTED
SEG NEUTROPHILS: 71 % (ref 36–66)
SEGMENTED NEUTROPHILS ABSOLUTE COUNT: 6.4 K/UL (ref 1.8–7.7)
SODIUM BLD-SCNC: 137 MMOL/L (ref 135–144)
SPECIMEN DESCRIPTION: NORMAL
TOTAL PROTEIN: 6.9 G/DL (ref 6.4–8.3)
TROPONIN INTERP: NORMAL
TROPONIN INTERP: NORMAL
TROPONIN T: NORMAL NG/ML
TROPONIN T: NORMAL NG/ML
TROPONIN, HIGH SENSITIVITY: 20 NG/L (ref 0–22)
TROPONIN, HIGH SENSITIVITY: 21 NG/L (ref 0–22)
WBC # BLD: 9 K/UL (ref 3.5–11)
WBC # BLD: ABNORMAL 10*3/UL

## 2021-02-24 PROCEDURE — 85610 PROTHROMBIN TIME: CPT

## 2021-02-24 PROCEDURE — 80053 COMPREHEN METABOLIC PANEL: CPT

## 2021-02-24 PROCEDURE — U0002 COVID-19 LAB TEST NON-CDC: HCPCS

## 2021-02-24 PROCEDURE — 84484 ASSAY OF TROPONIN QUANT: CPT

## 2021-02-24 PROCEDURE — 85025 COMPLETE CBC W/AUTO DIFF WBC: CPT

## 2021-02-24 PROCEDURE — 93005 ELECTROCARDIOGRAM TRACING: CPT | Performed by: EMERGENCY MEDICINE

## 2021-02-24 PROCEDURE — 71045 X-RAY EXAM CHEST 1 VIEW: CPT

## 2021-02-24 PROCEDURE — 1200000000 HC SEMI PRIVATE

## 2021-02-24 PROCEDURE — 71260 CT THORAX DX C+: CPT

## 2021-02-24 PROCEDURE — 36415 COLL VENOUS BLD VENIPUNCTURE: CPT

## 2021-02-24 PROCEDURE — 6360000004 HC RX CONTRAST MEDICATION: Performed by: EMERGENCY MEDICINE

## 2021-02-24 PROCEDURE — 2580000003 HC RX 258: Performed by: EMERGENCY MEDICINE

## 2021-02-24 PROCEDURE — 99285 EMERGENCY DEPT VISIT HI MDM: CPT

## 2021-02-24 PROCEDURE — 83880 ASSAY OF NATRIURETIC PEPTIDE: CPT

## 2021-02-24 PROCEDURE — 2060000000 HC ICU INTERMEDIATE R&B

## 2021-02-24 PROCEDURE — 6360000002 HC RX W HCPCS: Performed by: EMERGENCY MEDICINE

## 2021-02-24 PROCEDURE — 85379 FIBRIN DEGRADATION QUANT: CPT

## 2021-02-24 PROCEDURE — 99222 1ST HOSP IP/OBS MODERATE 55: CPT | Performed by: NURSE PRACTITIONER

## 2021-02-24 RX ORDER — FUROSEMIDE 10 MG/ML
20 INJECTION INTRAMUSCULAR; INTRAVENOUS ONCE
Status: COMPLETED | OUTPATIENT
Start: 2021-02-24 | End: 2021-02-24

## 2021-02-24 RX ORDER — KETOROLAC TROMETHAMINE 15 MG/ML
15 INJECTION, SOLUTION INTRAMUSCULAR; INTRAVENOUS ONCE
Status: COMPLETED | OUTPATIENT
Start: 2021-02-24 | End: 2021-02-24

## 2021-02-24 RX ORDER — ONDANSETRON 2 MG/ML
4 INJECTION INTRAMUSCULAR; INTRAVENOUS ONCE
Status: COMPLETED | OUTPATIENT
Start: 2021-02-24 | End: 2021-02-24

## 2021-02-24 RX ORDER — SODIUM CHLORIDE 0.9 % (FLUSH) 0.9 %
10 SYRINGE (ML) INJECTION PRN
Status: DISCONTINUED | OUTPATIENT
Start: 2021-02-24 | End: 2021-02-27 | Stop reason: HOSPADM

## 2021-02-24 RX ORDER — 0.9 % SODIUM CHLORIDE 0.9 %
70 INTRAVENOUS SOLUTION INTRAVENOUS ONCE
Status: COMPLETED | OUTPATIENT
Start: 2021-02-24 | End: 2021-02-24

## 2021-02-24 RX ORDER — PROMETHAZINE HYDROCHLORIDE 25 MG/ML
12.5 INJECTION, SOLUTION INTRAMUSCULAR; INTRAVENOUS ONCE
Status: COMPLETED | OUTPATIENT
Start: 2021-02-24 | End: 2021-02-24

## 2021-02-24 RX ADMIN — ONDANSETRON 4 MG: 2 INJECTION INTRAMUSCULAR; INTRAVENOUS at 18:34

## 2021-02-24 RX ADMIN — IOPAMIDOL 75 ML: 755 INJECTION, SOLUTION INTRAVENOUS at 15:29

## 2021-02-24 RX ADMIN — PROMETHAZINE HYDROCHLORIDE 12.5 MG: 25 INJECTION INTRAMUSCULAR; INTRAVENOUS at 19:24

## 2021-02-24 RX ADMIN — FUROSEMIDE 20 MG: 10 INJECTION, SOLUTION INTRAMUSCULAR; INTRAVENOUS at 18:34

## 2021-02-24 RX ADMIN — KETOROLAC TROMETHAMINE 15 MG: 15 INJECTION, SOLUTION INTRAMUSCULAR; INTRAVENOUS at 19:23

## 2021-02-24 RX ADMIN — SODIUM CHLORIDE, PRESERVATIVE FREE 10 ML: 5 INJECTION INTRAVENOUS at 15:29

## 2021-02-24 RX ADMIN — SODIUM CHLORIDE 70 ML: 9 INJECTION, SOLUTION INTRAVENOUS at 15:29

## 2021-02-24 ASSESSMENT — ENCOUNTER SYMPTOMS
CHEST TIGHTNESS: 1
NAUSEA: 0
SORE THROAT: 0
BACK PAIN: 0
TROUBLE SWALLOWING: 0
WHEEZING: 0
VOMITING: 0
ABDOMINAL PAIN: 0
DIARRHEA: 0
CONSTIPATION: 0
COUGH: 1
SHORTNESS OF BREATH: 1

## 2021-02-24 ASSESSMENT — PAIN SCALES - GENERAL
PAINLEVEL_OUTOF10: 7
PAINLEVEL_OUTOF10: 0
PAINLEVEL_OUTOF10: 0

## 2021-02-24 NOTE — ED NOTES
He states he gets relief and feels better after having oxygen on, so oxygen at 2LPM/NC for pt comfort.      Elena Sultana RN  02/24/21 [id number]

## 2021-02-24 NOTE — ED NOTES
2 hour lab work completed and sent to lab     Kallie Sarmiento, [address] [place] [address]  02/24/21 [id number]

## 2021-02-24 NOTE — ED PROVIDER NOTES
[facility]  [facility]  Phone: [phone]        Pt Name: Clifton Rust  MRN: [id number]  [place] 1945  Date of evaluation: 2/24/21      CHIEF COMPLAINT       Chief Complaint   Patient presents with    Chest Pain         HISTORY OF PRESENT ILLNESS    Clifton Rust is a 67 y.o. male who presents with a chief complaint of chest pain shortness of breath from his cardiologist office patient said the symptoms have gone on since he shoveled snow several days ago. Patient says it did not feel like the normal chest pain is had with heart attacks it felt more like a pressure sensation. It was relieved with oxygen and given full dose aspirin and nitroglycerin on the scene. He says it feels more like his COPD than his heart    REVIEW OF SYSTEMS         Review of Systems   Constitutional: Negative for chills and fever. HENT: Positive for congestion. Negative for dental problem, sore throat and trouble swallowing. Eyes: Negative for visual disturbance. Respiratory: Positive for cough, chest tightness and shortness of breath. Negative for wheezing. Cardiovascular: Positive for chest pain. Negative for palpitations and leg swelling. Gastrointestinal: Negative for abdominal pain, constipation, diarrhea, nausea and vomiting. Genitourinary: Negative for difficulty urinating and dysuria. Musculoskeletal: Negative for back pain, joint swelling and neck pain. Skin: Negative for rash. Neurological: Negative for dizziness, syncope, weakness and headaches. Hematological: Negative for adenopathy. Does not bruise/bleed easily. Psychiatric/Behavioral: Negative for confusion and suicidal ideas.          PAST MEDICAL HISTORY has a past medical history of Arthritis, Tracy's esophagus, Brunner's gland adenoma, CAD (coronary artery disease), Cancer ([facility] [address] Colon polyp, Dysphagia, Esophagitis, Gastritis, GERD (gastroesophageal reflux disease), Hx of blood clots, Hyperlipidemia, Hypertension, Lipoma, Myocardial infarction ([facility] [address] Nausea & vomiting, Pyogenic granuloma, and Vascular abnormality. SURGICAL HISTORY      has a past surgical history that includes Cardiac catheterization; Carotid endarterectomy; Cervical discectomy; Lumbar disc surgery; arthroplasty; arthroplasty; Tonsillectomy; Inguinal hernia repair; Brain tumor excision (2008); Cervical spine surgery (6-25-13); Coronary artery bypass graft (3/6/2014); Upper gastrointestinal endoscopy (4/30/2015); Upper gastrointestinal endoscopy (5/18/16); Upper gastrointestinal endoscopy (01/25/2017); joint replacement; other surgical history (Left, 12/12/2017); Upper gastrointestinal endoscopy (08/08/2018); Upper gastrointestinal endoscopy (8/8/2018); Colonoscopy (11/21/2018); Colonoscopy (N/A, 11/21/2018); Endoscopy, colon, diagnostic; Upper gastrointestinal endoscopy (09/04/2019); and Upper gastrointestinal endoscopy (N/A, 9/4/2019). CURRENT MEDICATIONS       Previous Medications    ALBUTEROL (PROVENTIL HFA;VENTOLIN HFA) 108 (90 BASE) MCG/ACT INHALER    Inhale 2 puffs into the lungs every 6 hours as needed for Wheezing. ALPRAZOLAM (XANAX PO)    Take 0.5 tablets by mouth 2 times daily as needed     ASCORBIC ACID (VITAMIN C) 1000 MG TABLET    Take 1,000 mg by mouth daily    ASPIRIN 81 MG CHEWABLE TABLET    Take 1 tablet by mouth daily. CITALOPRAM (CELEXA) 10 MG TABLET    Take 1 tablet by mouth daily    DOCUSATE (COLACE, DULCOLAX) 100 MG CAPS    Take 100 mg by mouth 2 times daily    FENTANYL (DURAGESIC) 50 MCG/HR    Place 1 patch onto the skin every other day  . HYDROCODONE-ACETAMINOPHEN (NORCO)  MG PER TABLET    Take 1 tablet by mouth every 4 hours as needed for Pain . METOPROLOL TARTRATE (LOPRESSOR) 25 MG TABLET    Take 0.25 tablets by mouth 2 times daily    NITROGLYCERIN (NITROSTAT) 0.4 MG SL TABLET    up to max of 3 total doses. If no relief after 1 dose, call 911. OMEPRAZOLE (PRILOSEC) 20 MG DELAYED RELEASE CAPSULE    TAKE ONE CAPSULE BY MOUTH DAILY    ONDANSETRON (ZOFRAN-ODT) 4 MG DISINTEGRATING TABLET    DISSOLVE 1 TABLET ON THE TONGUE EVERY 8 HOURS AS NEEDED FOR NAUSEA OR VOMITING       ALLERGIES     is allergic to vancomycin. FAMILY HISTORY     He indicated that his mother is . He indicated that his father is . He indicated that his maternal grandmother is . family history includes Lung Cancer in his father; Stroke in his maternal grandmother. SOCIAL HISTORY      reports that he quit smoking about 2 years ago. His smoking use included cigarettes. He has a 7.50 pack-year smoking history. He has quit using smokeless tobacco. He reports that he does not drink alcohol or use drugs. PHYSICAL EXAM     INITIAL VITALS:  height is 5' 5\" (1.651 m) and weight is 68 kg (150 lb). His temperature is 99.3 °F (37.4 °C). His blood pressure is 206/98 (abnormal) and his pulse is 53. His respiration is 18 and oxygen saturation is 97%. Physical Exam  Constitutional:       General: He is not in acute distress. Appearance: Normal appearance. He is well-developed. He is not ill-appearing or diaphoretic. HENT:      Head: Normocephalic and atraumatic. Right Ear: External ear normal.      Left Ear: External ear normal.   Eyes:      Pupils: Pupils are equal, round, and reactive to light. Neck:      Musculoskeletal: Normal range of motion and neck supple. Cardiovascular:      Rate and Rhythm: Regular rhythm. Bradycardia present. Pulmonary:      Effort: Pulmonary effort is normal.      Breath sounds: Rales present. Comments: Few rales heard at the base, generally moving air fairly well, note is made that he has to 50 mcg of fentanyl patches on his chest he says he supposed to get 100 mcg an hour for chronic pain  Abdominal:      General: Bowel sounds are normal.      Palpations: Abdomen is soft. Musculoskeletal: Normal range of motion. General: No swelling, tenderness, deformity or signs of injury. Right lower leg: No edema. Left lower leg: No edema. Skin:     General: Skin is warm and dry. Neurological:      General: No focal deficit present. Mental Status: He is alert and oriented to person, place, and time. Psychiatric:         Mood and Affect: Mood normal.         Behavior: Behavior normal.           DIFFERENTIAL DIAGNOSIS/ MDM:     Dyspnea and chest pain will do a work-up    DIAGNOSTIC RESULTS     EKG: All EKG's are interpreted by the Emergency Department Physician who either signs or Co-signs this chart in the absence of a cardiologist.    Sinus bradycardia with sinus arrhythmia rate of 50 bpm SC interval is 154 ms QRS durations 88 ms QT corrected 406 ms axis of 75 there is no acute elevation there is a little bit of nonspecific changes in the lateral inferior leads    Repeat EKG shows sinus bradycardia rate of 50bpm SC was 166 ms QRS durations 86 ms QT corrected 413 ms axis of 75 again is nonspecific lateral ST changes there is no elevation seen  RADIOLOGY:   Non-plain film images such as CT, Ultrasound and MRI are read by the radiologist. Plain radiographic images are visualized and the radiologist interpretations are reviewed as follows:        EXAMINATION:   ONE XRAY VIEW OF THE CHEST       2/24/2021 11:23 am       COMPARISON:   12/01/2018       HISTORY:   ORDERING SYSTEM PROVIDED HISTORY: chest pain   TECHNOLOGIST PROVIDED HISTORY:   chest pain   Reason for Exam: Chest tightness. No trauma. Hx of MI, stents placed.    Acuity: Acute   Type of Exam: Initial Relevant Medical/Surgical History: Hx of MI, stents placed.       FINDINGS:   Cardiac size is enlarged. No acute infiltrates are seen . The pulmonary   vascularity is unremarkable.  No pneumothorax.  No pleural effusions   identified .  Postsurgical changes overlying the mediastinum and cervical   spine.  Stable degenerative changes seen in the spine.  Old right rib   fracture.  Stable partial resection of the distal left clavicle.           Impression   Postsurgical chest. No acute cardiopulmonary disease. CTA OF THE CHEST 2/24/2021 3:14 pm       TECHNIQUE:   CTA of the chest was performed after the administration of intravenous   contrast.  Multiplanar reformatted images are provided for review.  MIP   images are provided for review.  Dose modulation, iterative reconstruction,   and/or weight based adjustment of the mA/kV was utilized to reduce the   radiation dose to as low as reasonably achievable.       COMPARISON:   June 13, 2019.  December 1, 2018.       HISTORY:   ORDERING SYSTEM PROVIDED HISTORY: Dyspnea, elevated D-dimer   TECHNOLOGIST PROVIDED HISTORY:   Dyspnea, elevated D-dimer   Decision Support Exception->Emergency Medical Condition (MA)   Reason for Exam: Chest pain, dyspnea, elevated D-Dimer   Acuity: Acute   Type of Exam: Initial       FINDINGS:   Pulmonary Arteries: Pulmonary arteries are adequately opacified for   evaluation.  No evidence of intraluminal filling defect to suggest pulmonary   embolism.  Main pulmonary artery is normal in caliber.       Mediastinum: There is mild right hilar lymphadenopathy.  A representative   measures 1.9 x 1.7 cm (series 2, image 57).  No enlarged mediastinal lymph   node by CT criteria.  No axillary lymphadenopathy.       There is global cardiomegaly.  No pericardial effusion.  The aorta is not   opacified which precludes evaluation for dissection.  There is no thoracic   aortic aneurysm.     Lungs/pleura: Minimal bilateral lower lobe atelectasis.  Otherwise, the lungs   are without acute process.  No focal consolidation.  Interlobular septal   thickening consistent with mild pulmonary/interstitial edema. Daved Jubilee is trace   right pleural effusion.       Upper Abdomen: There is cholelithiasis.       Soft Tissues/Bones: No acute bone or soft tissue abnormality.  Chronic   bilateral rib fracture deformities.           Impression   No evidence of pulmonary embolism or acute lung pathology.       Overall findings suggesting decompensated CHF with mild   pulmonary/interstitial edema and trace right pleural effusion.       Mild right hilar lymphadenopathy could be seen in the setting of pulmonary   edema.  Follow-up CT chest in 3-6 months would be recommended for   conservative measures to ensure resolution or stability.             LABS:  Results for orders placed or performed during the hospital encounter of 02/24/21   COVID-19, Rapid    Specimen: Nasopharyngeal Swab   Result Value Ref Range    Specimen Description . NASOPHARYNGEAL SWAB     SARS-CoV-2, Rapid Not Detected Not Detected   CBC Auto Differential   Result Value Ref Range    WBC 9.0 3.5 - 11.0 k/uL    RBC 3.97 (L) 4.5 - 5.9 m/uL    Hemoglobin 12.5 (L) 13.5 - 17.5 g/dL    Hematocrit 37.8 (L) 41 - 53 %    MCV 95.3 80 - 100 fL    MCH 31.4 26 - 34 pg    MCHC 32.9 31 - 37 g/dL    RDW 16.5 (H) 12.5 - 15.4 %    Platelets 082 024 - 953 k/uL    MPV 8.1 6.0 - 12.0 fL    NRBC Automated NOT REPORTED per 100 WBC    Differential Type NOT REPORTED     Seg Neutrophils 71 (H) 36 - 66 %    Lymphocytes 20 (L) 24 - 44 %    Monocytes 7 2 - 11 %    Eosinophils % 1 1 - 4 %    Basophils 1 0 - 2 %    Immature Granulocytes NOT REPORTED 0 %    Segs Absolute 6.40 1.8 - 7.7 k/uL    Absolute Lymph # 1.80 1.0 - 4.8 k/uL    Absolute Mono # 0.70 0.1 - 1.2 k/uL    Absolute Eos # 0.10 0.0 - 0.4 k/uL    Basophils Absolute 0.10 0.0 - 0.2 k/uL Absolute Immature Granulocyte NOT REPORTED 0.00 - 0.30 k/uL    WBC Morphology NOT REPORTED     RBC Morphology NOT REPORTED     Platelet Estimate NOT REPORTED    Comprehensive Metabolic Panel   Result Value Ref Range    Glucose 110 (H) 70 - 99 mg/dL    BUN 19 8 - 23 mg/dL    CREATININE 1.40 (H) 0.70 - 1.20 mg/dL    Bun/Cre Ratio NOT REPORTED 9 - 20    Calcium 9.6 8.6 - 10.4 mg/dL    Sodium 137 135 - 144 mmol/L    Potassium 4.7 3.7 - 5.3 mmol/L    Chloride 102 98 - 107 mmol/L    CO2 26 20 - 31 mmol/L    Anion Gap 9 9 - 17 mmol/L    Alkaline Phosphatase 85 40 - 129 U/L    ALT 7 5 - 41 U/L    AST 18 <40 U/L    Total Bilirubin 0.60 0.3 - 1.2 mg/dL    Total Protein 6.9 6.4 - 8.3 g/dL    Albumin 4.2 3.5 - 5.2 g/dL    Albumin/Globulin Ratio 1.6 1.0 - 2.5    GFR Non- 49 (L) >60 mL/min    GFR African American 60 (L) >60 mL/min    GFR Comment          GFR Staging NOT REPORTED    Protime-INR   Result Value Ref Range    Protime 11.9 9.4 - 12.6 sec    INR 1.1    Troponin   Result Value Ref Range    Troponin, High Sensitivity 21 0 - 22 ng/L    Troponin T NOT REPORTED <0.03 ng/mL    Troponin Interp NOT REPORTED    Brain Natriuretic Peptide   Result Value Ref Range    Pro-BNP 8,747 (H) <300 pg/mL    BNP Interpretation Pro-BNP Reference Range:    D-Dimer, Quantitative   Result Value Ref Range    D-Dimer, Quant 0.82 mg/L FEU   Troponin   Result Value Ref Range    Troponin, High Sensitivity 20 0 - 22 ng/L    Troponin T NOT REPORTED <0.03 ng/mL    Troponin Interp NOT REPORTED    EKG 12 Lead   Result Value Ref Range    Ventricular Rate 50 BPM    Atrial Rate 50 BPM    P-R Interval 154 ms    QRS Duration 88 ms    Q-T Interval 446 ms    QTc Calculation (Bazett) 406 ms    P Axis 25 degrees    R Axis 75 degrees    T Axis 53 degrees           EMERGENCY DEPARTMENT COURSE:   Vitals:    Vitals:    02/24/21 1417 02/24/21 1559 02/24/21 1636 02/24/21 1735   BP:  (!) 169/73 (!) 169/73 (!) 206/98   Pulse: (!) 9 50 (!) 87 89

## 2021-02-25 ENCOUNTER — APPOINTMENT (OUTPATIENT)
Dept: GENERAL RADIOLOGY | Age: 76
DRG: 291 | End: 2021-02-25
Payer: MEDICARE

## 2021-02-25 LAB
BNP INTERPRETATION: ABNORMAL
CHOLESTEROL/HDL RATIO: 2.6
CHOLESTEROL: 90 MG/DL
EKG ATRIAL RATE: 47 BPM
EKG ATRIAL RATE: 50 BPM
EKG ATRIAL RATE: 50 BPM
EKG P AXIS: 115 DEGREES
EKG P AXIS: 24 DEGREES
EKG P AXIS: 25 DEGREES
EKG P-R INTERVAL: 152 MS
EKG P-R INTERVAL: 154 MS
EKG P-R INTERVAL: 166 MS
EKG Q-T INTERVAL: 446 MS
EKG Q-T INTERVAL: 454 MS
EKG Q-T INTERVAL: 482 MS
EKG QRS DURATION: 78 MS
EKG QRS DURATION: 86 MS
EKG QRS DURATION: 88 MS
EKG QTC CALCULATION (BAZETT): 406 MS
EKG QTC CALCULATION (BAZETT): 413 MS
EKG QTC CALCULATION (BAZETT): 426 MS
EKG R AXIS: 45 DEGREES
EKG R AXIS: 75 DEGREES
EKG R AXIS: 75 DEGREES
EKG T AXIS: 53 DEGREES
EKG T AXIS: 66 DEGREES
EKG T AXIS: 94 DEGREES
EKG VENTRICULAR RATE: 47 BPM
EKG VENTRICULAR RATE: 50 BPM
EKG VENTRICULAR RATE: 50 BPM
HCT VFR BLD CALC: 35.9 % (ref 40.7–50.3)
HDLC SERPL-MCNC: 35 MG/DL
HEMOGLOBIN: 11.7 G/DL (ref 13–17)
LDL CHOLESTEROL: 43 MG/DL (ref 0–130)
MAGNESIUM: 1.8 MG/DL (ref 1.6–2.6)
MCH RBC QN AUTO: 31 PG (ref 25.2–33.5)
MCHC RBC AUTO-ENTMCNC: 32.6 G/DL (ref 28.4–34.8)
MCV RBC AUTO: 95 FL (ref 82.6–102.9)
NRBC AUTOMATED: 0 PER 100 WBC
PDW BLD-RTO: 15.1 % (ref 11.8–14.4)
PLATELET # BLD: 170 K/UL (ref 138–453)
PMV BLD AUTO: 9.8 FL (ref 8.1–13.5)
PRO-BNP: ABNORMAL PG/ML
RBC # BLD: 3.78 M/UL (ref 4.21–5.77)
TRIGL SERPL-MCNC: 60 MG/DL
TROPONIN INTERP: ABNORMAL
TROPONIN T: ABNORMAL NG/ML
TROPONIN, HIGH SENSITIVITY: 31 NG/L (ref 0–22)
TSH SERPL DL<=0.05 MIU/L-ACNC: 1.52 MIU/L (ref 0.3–5)
VLDLC SERPL CALC-MCNC: ABNORMAL MG/DL (ref 1–30)
WBC # BLD: 8.4 K/UL (ref 3.5–11.3)

## 2021-02-25 PROCEDURE — 71046 X-RAY EXAM CHEST 2 VIEWS: CPT

## 2021-02-25 PROCEDURE — 6370000000 HC RX 637 (ALT 250 FOR IP): Performed by: NURSE PRACTITIONER

## 2021-02-25 PROCEDURE — 97530 THERAPEUTIC ACTIVITIES: CPT

## 2021-02-25 PROCEDURE — 80061 LIPID PANEL: CPT

## 2021-02-25 PROCEDURE — 93005 ELECTROCARDIOGRAM TRACING: CPT | Performed by: NURSE PRACTITIONER

## 2021-02-25 PROCEDURE — 6360000002 HC RX W HCPCS: Performed by: INTERNAL MEDICINE

## 2021-02-25 PROCEDURE — 6370000000 HC RX 637 (ALT 250 FOR IP): Performed by: INTERNAL MEDICINE

## 2021-02-25 PROCEDURE — 99232 SBSQ HOSP IP/OBS MODERATE 35: CPT | Performed by: INTERNAL MEDICINE

## 2021-02-25 PROCEDURE — 83735 ASSAY OF MAGNESIUM: CPT

## 2021-02-25 PROCEDURE — 94761 N-INVAS EAR/PLS OXIMETRY MLT: CPT

## 2021-02-25 PROCEDURE — 85027 COMPLETE CBC AUTOMATED: CPT

## 2021-02-25 PROCEDURE — 83880 ASSAY OF NATRIURETIC PEPTIDE: CPT

## 2021-02-25 PROCEDURE — 93010 ELECTROCARDIOGRAM REPORT: CPT | Performed by: INTERNAL MEDICINE

## 2021-02-25 PROCEDURE — 2060000000 HC ICU INTERMEDIATE R&B

## 2021-02-25 PROCEDURE — 84484 ASSAY OF TROPONIN QUANT: CPT

## 2021-02-25 PROCEDURE — 2580000003 HC RX 258: Performed by: INTERNAL MEDICINE

## 2021-02-25 PROCEDURE — 84443 ASSAY THYROID STIM HORMONE: CPT

## 2021-02-25 PROCEDURE — 2700000000 HC OXYGEN THERAPY PER DAY

## 2021-02-25 PROCEDURE — 97116 GAIT TRAINING THERAPY: CPT

## 2021-02-25 PROCEDURE — 97161 PT EVAL LOW COMPLEX 20 MIN: CPT

## 2021-02-25 PROCEDURE — 36415 COLL VENOUS BLD VENIPUNCTURE: CPT

## 2021-02-25 RX ORDER — PANTOPRAZOLE SODIUM 40 MG/1
40 TABLET, DELAYED RELEASE ORAL
Status: DISCONTINUED | OUTPATIENT
Start: 2021-02-25 | End: 2021-02-27 | Stop reason: HOSPADM

## 2021-02-25 RX ORDER — ASPIRIN 81 MG/1
81 TABLET, CHEWABLE ORAL DAILY
Status: DISCONTINUED | OUTPATIENT
Start: 2021-02-25 | End: 2021-02-27 | Stop reason: HOSPADM

## 2021-02-25 RX ORDER — PROMETHAZINE HYDROCHLORIDE 12.5 MG/1
12.5 TABLET ORAL EVERY 6 HOURS PRN
Status: DISCONTINUED | OUTPATIENT
Start: 2021-02-25 | End: 2021-02-27 | Stop reason: HOSPADM

## 2021-02-25 RX ORDER — SODIUM CHLORIDE 0.9 % (FLUSH) 0.9 %
10 SYRINGE (ML) INJECTION EVERY 12 HOURS SCHEDULED
Status: DISCONTINUED | OUTPATIENT
Start: 2021-02-25 | End: 2021-02-27 | Stop reason: HOSPADM

## 2021-02-25 RX ORDER — FENTANYL 50 UG/H
1 PATCH TRANSDERMAL
Status: DISCONTINUED | OUTPATIENT
Start: 2021-02-25 | End: 2021-02-27 | Stop reason: HOSPADM

## 2021-02-25 RX ORDER — ALPRAZOLAM 0.5 MG/1
0.5 TABLET ORAL 2 TIMES DAILY PRN
Status: DISCONTINUED | OUTPATIENT
Start: 2021-02-25 | End: 2021-02-27 | Stop reason: HOSPADM

## 2021-02-25 RX ORDER — FENTANYL 50 UG/H
1 PATCH TRANSDERMAL EVERY OTHER DAY
Status: DISCONTINUED | OUTPATIENT
Start: 2021-02-25 | End: 2021-02-25

## 2021-02-25 RX ORDER — ALBUTEROL SULFATE 90 UG/1
2 AEROSOL, METERED RESPIRATORY (INHALATION) EVERY 6 HOURS PRN
Status: DISCONTINUED | OUTPATIENT
Start: 2021-02-25 | End: 2021-02-27 | Stop reason: HOSPADM

## 2021-02-25 RX ORDER — ACETAMINOPHEN 650 MG/1
650 SUPPOSITORY RECTAL EVERY 6 HOURS PRN
Status: DISCONTINUED | OUTPATIENT
Start: 2021-02-25 | End: 2021-02-27 | Stop reason: HOSPADM

## 2021-02-25 RX ORDER — HYDRALAZINE HYDROCHLORIDE 20 MG/ML
10 INJECTION INTRAMUSCULAR; INTRAVENOUS EVERY 6 HOURS PRN
Status: DISCONTINUED | OUTPATIENT
Start: 2021-02-25 | End: 2021-02-27 | Stop reason: HOSPADM

## 2021-02-25 RX ORDER — POLYETHYLENE GLYCOL 3350 17 G/17G
17 POWDER, FOR SOLUTION ORAL DAILY PRN
Status: DISCONTINUED | OUTPATIENT
Start: 2021-02-25 | End: 2021-02-27 | Stop reason: HOSPADM

## 2021-02-25 RX ORDER — HYDROCODONE BITARTRATE AND ACETAMINOPHEN 10; 325 MG/1; MG/1
1 TABLET ORAL EVERY 4 HOURS PRN
Status: DISCONTINUED | OUTPATIENT
Start: 2021-02-25 | End: 2021-02-27 | Stop reason: HOSPADM

## 2021-02-25 RX ORDER — SODIUM CHLORIDE 0.9 % (FLUSH) 0.9 %
10 SYRINGE (ML) INJECTION PRN
Status: DISCONTINUED | OUTPATIENT
Start: 2021-02-25 | End: 2021-02-27 | Stop reason: HOSPADM

## 2021-02-25 RX ORDER — ONDANSETRON 2 MG/ML
4 INJECTION INTRAMUSCULAR; INTRAVENOUS EVERY 6 HOURS PRN
Status: DISCONTINUED | OUTPATIENT
Start: 2021-02-25 | End: 2021-02-27 | Stop reason: HOSPADM

## 2021-02-25 RX ORDER — FUROSEMIDE 10 MG/ML
40 INJECTION INTRAMUSCULAR; INTRAVENOUS 2 TIMES DAILY
Status: DISCONTINUED | OUTPATIENT
Start: 2021-02-25 | End: 2021-02-27 | Stop reason: HOSPADM

## 2021-02-25 RX ORDER — NICOTINE 21 MG/24HR
1 PATCH, TRANSDERMAL 24 HOURS TRANSDERMAL DAILY PRN
Status: DISCONTINUED | OUTPATIENT
Start: 2021-02-25 | End: 2021-02-27 | Stop reason: HOSPADM

## 2021-02-25 RX ORDER — ASCORBIC ACID 500 MG
1000 TABLET ORAL DAILY
Status: DISCONTINUED | OUTPATIENT
Start: 2021-02-25 | End: 2021-02-27 | Stop reason: HOSPADM

## 2021-02-25 RX ORDER — ACETAMINOPHEN 325 MG/1
650 TABLET ORAL EVERY 6 HOURS PRN
Status: DISCONTINUED | OUTPATIENT
Start: 2021-02-25 | End: 2021-02-27 | Stop reason: HOSPADM

## 2021-02-25 RX ORDER — DOCUSATE SODIUM 100 MG/1
100 CAPSULE, LIQUID FILLED ORAL 2 TIMES DAILY
Status: DISCONTINUED | OUTPATIENT
Start: 2021-02-25 | End: 2021-02-27 | Stop reason: HOSPADM

## 2021-02-25 RX ADMIN — PANTOPRAZOLE SODIUM 40 MG: 40 TABLET, DELAYED RELEASE ORAL at 05:54

## 2021-02-25 RX ADMIN — ASPIRIN 81 MG: 81 TABLET, CHEWABLE ORAL at 13:19

## 2021-02-25 RX ADMIN — DOCUSATE SODIUM 100 MG: 100 CAPSULE, LIQUID FILLED ORAL at 13:19

## 2021-02-25 RX ADMIN — SODIUM CHLORIDE, PRESERVATIVE FREE 10 ML: 5 INJECTION INTRAVENOUS at 11:00

## 2021-02-25 RX ADMIN — DOCUSATE SODIUM 100 MG: 100 CAPSULE, LIQUID FILLED ORAL at 22:03

## 2021-02-25 RX ADMIN — ALPRAZOLAM 0.5 MG: 0.5 TABLET ORAL at 10:59

## 2021-02-25 RX ADMIN — METOPROLOL TARTRATE 12.5 MG: 25 TABLET, FILM COATED ORAL at 22:03

## 2021-02-25 RX ADMIN — SODIUM CHLORIDE, PRESERVATIVE FREE 10 ML: 5 INJECTION INTRAVENOUS at 22:03

## 2021-02-25 RX ADMIN — FUROSEMIDE 40 MG: 10 INJECTION, SOLUTION INTRAMUSCULAR; INTRAVENOUS at 11:00

## 2021-02-25 RX ADMIN — OXYCODONE HYDROCHLORIDE AND ACETAMINOPHEN 1000 MG: 500 TABLET ORAL at 13:19

## 2021-02-25 ASSESSMENT — ENCOUNTER SYMPTOMS
BLOOD IN STOOL: 0
DIARRHEA: 0
COUGH: 0
WHEEZING: 0
VOMITING: 0
SHORTNESS OF BREATH: 1
CHEST TIGHTNESS: 1
NAUSEA: 0
ABDOMINAL PAIN: 0
COLOR CHANGE: 0
CONSTIPATION: 0

## 2021-02-25 ASSESSMENT — PAIN SCALES - GENERAL: PAINLEVEL_OUTOF10: 0

## 2021-02-25 NOTE — PROGRESS NOTES
albuterol (PROVENTIL HFA;VENTOLIN HFA) 108 (90 BASE) MCG/ACT inhaler, Inhale 2 puffs into the lungs every 6 hours as needed for Wheezing. fentaNYL (DURAGESIC) 50 MCG/HR, Place 1 patch onto the skin every other day. aspirin 81 MG chewable tablet, Take 1 tablet by mouth daily.

## 2021-02-25 NOTE — PROGRESS NOTES
Patient removes all three Fentanyl patches in presence of this nurse and in-house NP, Clint Kim, and disposed of in proper narcotic disposal.  New Fentanyl patch 50 mcg ordered & applied to patient. BP retaken = 160/61, HR = 52. Less emotional.  Bed alarm activated & side rails x2 elevated for patient safety. Will continue to monitor.

## 2021-02-25 NOTE — PROGRESS NOTES
[facility]  Office: [address], DO, Dawna Lesches, DO, Kaylah Chris, DO, Erik Williamson, DO, David Patten MD, Regine Bautista MD, Margarita Danielson MD, Jonah Lange MD, Shai Funes MD, Dion Sprague MD, Dirk Menjivar MD, Mei Ortiz MD, Dexter Wisdom MD, Saloni Del Roi DO, Cesar Amaya MD, Amparo Johnson MD, Jade Solorio DO, Kalpesh Lee MD,  Jo Blake DO, Ashko Rios MD, Ana Maloney MD, Remy Garcia, [facility], [facility], [facility], Marysol Rivers, CNP, Jayashree Dickey, CNS, Camelia Hook, CNP, Phong Leon, CNP, Fatou Parada, CNP, Barbara Belle, CNP, Tanya Spence, CNP, Thanh Fletcher PA-C, Derick Wisdom, [facility], Domingo Bro, CNP, Dipak Schmitt, CNP, Jimi Robert, CNP, Saira Nelson, CNP, Elva Mann, [facility]    Progress Note    2/25/2021    10:56 AM    Name:   Daryl Valerio  MRN:     [id number]     Acct:      [de-identified]   Room:   [address] [place] [address] Day:  1  Admit Date:  2/24/2021  2:16 PM    PCP:   Sayra Walker  Code Status:  Full Code    Subjective:     C/C:   Chief Complaint   Patient presents with    Chest Pain     Interval History Status: not changed. Having some anxiety this AM  No other complaints  States sob improved  Takes 1 fentanyl patch every 2 days  Discussed with RN  Resume home anxiety meds     Brief History:     Daryl Valerio is a 67 y.o. M with hx of CAD s/p CABG, HTN, HLD, CKD who presents to the hospital with complaint of chest pressure. He reports that he has been experiencing intermittent left-sided chest pressure for several days. He states the onset happened after he shoveled snow. He endorses associated shortness of breath. He denies diaphoresis. He was given aspirin, sublingual nitroglycerin and supplemental oxygen. He states that his pain was greatly alleviated with these measures. He denies fever, chills, nausea or vomiting. In ER troponin negative, EKG sinus bradycardia. CT chest negative for PE, demonstrating CHF. Admitted for CHF     Review of Systems:     Constitutional:  negative for chills, fevers, sweats  Respiratory:  negative for cough, dyspnea on exertion, positive for sob   Cardiovascular:  negative for chest pain, chest pressure/discomfort  Gastrointestinal:  negative for abdominal pain, nausea, vomiting  Neurological:  negative for dizziness, headache    Medications: Allergies: Allergies   Allergen Reactions    Vancomycin      AFFECTS KIDNEYS         Current Meds:   Scheduled Meds:    sodium chloride flush  10 mL Intravenous 2 times per day    enoxaparin  40 mg Subcutaneous Daily    furosemide  40 mg Intravenous BID    vitamin C  1,000 mg Oral Daily    aspirin  81 mg Oral Daily    docusate sodium  100 mg Oral BID    pantoprazole  40 mg Oral QAM AC    fentaNYL  1 patch Transdermal Q48H    metoprolol tartrate  12.5 mg Oral BID     Continuous Infusions:   PRN Meds: sodium chloride flush, nicotine, promethazine **OR** ondansetron, polyethylene glycol, acetaminophen **OR** acetaminophen, albuterol sulfate HFA, HYDROcodone-acetaminophen, hydrALAZINE, ALPRAZolam, sodium chloride flush    Data:     Past Medical History:   has a past medical history of Arthritis, Tracy's esophagus, Brunner's gland adenoma, CAD (coronary artery disease), Cancer ([facility] [address] CKD (chronic kidney disease), Colon polyp, Dysphagia, Esophagitis, Gastritis, GERD (gastroesophageal reflux disease), Hx of blood clots, Hyperlipidemia, Hypertension, Lipoma, Myocardial infarction ([facility] [address] Nausea & vomiting, Pyogenic granuloma, and Vascular abnormality. Social History:   reports that he quit smoking about 2 years ago. His smoking use included cigarettes. He has a 7.50 pack-year smoking history. He has quit using smokeless tobacco. He reports that he does not drink alcohol or use drugs.      Family History:   Family History Problem Relation Age of Onset    Lung Cancer Father     Stroke Maternal Grandmother        Vitals:  BP (!) 168/64   Pulse 51   Temp 97.7 °F (36.5 °C) (Oral)   Resp 18   Ht 5' 5\" (1.651 m)   Wt 147 lb 3.2 oz (66.8 kg)   SpO2 93%   BMI 24.50 kg/m²   Temp (24hrs), Av.2 °F (36.8 °C), Min:97.7 °F (36.5 °C), Max:99.3 °F (37.4 °C)    No results for input(s): POCGLU in the last 72 hours. I/O (24Hr):     Intake/Output Summary (Last 24 hours) at 2021 1056  Last data filed at 2021 1900  Gross per 24 hour   Intake    Output 500 ml   Net -500 ml       Labs:  Hematology:  Recent Labs     21  14121  0548   WBC 9.0 8.4   RBC 3.97* 3.78*   HGB 12.5* 11.7*   HCT 37.8* 35.9*   MCV 95.3 95.0   MCH 31.4 31.0   MCHC 32.9 32.6   RDW 16.5* 15.1*    170   MPV 8.1 9.8   INR 1.1  --    DDIMER 0.82  --      Chemistry:  Recent Labs     21  17021  0548     --   --    K 4.7  --   --      --   --    CO2 26  --   --    GLUCOSE 110*  --   --    BUN 19  --   --    CREATININE 1.40*  --   --    MG  --   --  1.8   ANIONGAP 9  --   --    LABGLOM 49*  --   --    GFRAA 60*  --   --    CALCIUM 9.6  --   --    PROBNP 8,747*  --  13,781*   TROPHS 21 20 31*     Recent Labs     21  0548   PROT 6.9  --    LABALBU 4.2  --    TSH  --  1.52   AST 18  --    ALT 7  --    ALKPHOS 85  --    BILITOT 0.60  --    CHOL  --  90   HDL  --  35*   LDLCHOLESTEROL  --  43   CHOLHDLRATIO  --  2.6   TRIG  --  60   VLDL  --  NOT REPORTED     ABG:  Lab Results   Component Value Date    POCPH 7.35 2014    POCPCO2 43 2014    POCPO2 85 2014    POCHCO3 23.8 2014    NBEA 2 2014    PBEA NOT REPORTED 2014    FWS4MEH 25 2014    BCKY7TLQ 96 2014    FIO2 30.0 2014     Lab Results   Component Value Date/Time    SPECIAL NOT REPORTED 2015 10:28 AM     No results found for: CULTURE    Radiology:  Xr Chest (2 Vw) Result Date: 2/25/2021  Mild pulmonary/interstitial edema with trace right pleural effusion. Xr Chest Portable    Result Date: 2/24/2021  Postsurgical chest. No acute cardiopulmonary disease. Ct Chest Pulmonary Embolism W Contrast    Result Date: 2/24/2021  No evidence of pulmonary embolism or acute lung pathology. Overall findings suggesting decompensated CHF with mild pulmonary/interstitial edema and trace right pleural effusion. Mild right hilar lymphadenopathy could be seen in the setting of pulmonary edema. Follow-up CT chest in 3-6 months would be recommended for conservative measures to ensure resolution or stability.        Physical Examination:        General appearance:  alert, cooperative and no distress  Mental Status:  oriented to person, place and time and normal affect  Lungs:  clear to auscultation bilaterally, normal effort  Heart:  regular rate and rhythm  Abdomen:  soft, nontender, nondistended, normal bowel sounds  Extremities:  no edema, redness, tenderness in the calves  Skin:  no gross lesions, rashes, induration    Assessment:        Hospital Problems           Last Modified POA    * (Principal) Acute on chronic diastolic heart failure ([address] 2/24/2021 Yes    Dyslipidemia 2/24/2021 Yes    Coronary artery disease involving coronary bypass graft of native heart 2/24/2021 Yes    Overview Signed 3/20/2014 11:19 AM by Cyn Hoskins MD     stents 2002   CABG 3/6/14         CKD (chronic kidney disease) stage 3, GFR 30-59 ml/min 2/24/2021 Yes    Chest pain 2/24/2021 Yes    Elevated d-dimer 2/24/2021 Yes          Plan:        - Vitals, labs, imaging, medications reviewed  - Continue diuresis  - Monitor intake/output  - Monitor renal function  - Continue home BP medications  - Continue home pain medications  - Cardiology evaluation    Sharma Babinski, MD  2/25/2021  10:56 AM

## 2021-02-25 NOTE — ED NOTES
[facility] contacted for transport, eta is 2300 tonight. Pt and family updated.      Jelani Juarez RN  02/24/21 2005

## 2021-02-25 NOTE — ED NOTES
Pt has begun to diurese. Pt frequently moans loudly and when asked, pt states nothing is wrong. Wife , however, came out to the nurses station stating that the pt is experiencing severe nausea.  Pt somewhat agitated, stating its taking too long to get him to the hospital.      Prema Campbell, SONG  02/24/21 8012

## 2021-02-25 NOTE — ED NOTES
Report called to [employer]       Chris Marcelo RN  02/24/21 3548

## 2021-02-25 NOTE — PROGRESS NOTES
Occupational Therapy    DATE: 2021    NAME: Yeny Spann  MRN: [id number]   : 1945    Patient not seen this date for Occupational Therapy due to:  [] Blood transfusion in progress  [] Cancel by RN  [] Hemodialysis  [x]  Refusal by Patient - Pt extremely anxious stating \"I just want to go home\". OT encouraged pt to get OOB and go for a walk, use the restroom, wash up, etc. Pt willing at first, then quickly declined, closed his eyes, and shut the lights off. OT to check back if able.    [] Spine Precautions   [] Strict Bedrest  [] Surgery  [] Testing      [] Other        [] OT being discontinued at this time. Patient independent. No further needs. [] OT being discontinued at this time as the patient has been transferred to hospice care. No further needs.     [place] OTR/L

## 2021-02-25 NOTE — ED NOTES
[facility] contacted for transport due to [facility] eta, no ALS trucks available.        Ana Kwong RN  02/24/21 2007

## 2021-02-25 NOTE — PLAN OF CARE
Problem: Falls - Risk of:  Goal: Will remain free from falls  Description: Will remain free from falls  2/25/2021 1544 by Dano Funes RN  Outcome: Ongoing     Problem: Falls - Risk of:  Goal: Absence of physical injury  Description: Absence of physical injury  2/25/2021 1544 by Dano Funes RN  Outcome: Ongoing     Problem: Pain:  Goal: Pain level will decrease  Description: Pain level will decrease  2/25/2021 1544 by Dano Funes RN  Outcome: Ongoing     Problem: Pain:  Goal: Control of acute pain  Description: Control of acute pain  2/25/2021 1544 by Dano Funes RN  Outcome: Ongoing     Problem: Pain:  Goal: Control of chronic pain  Description: Control of chronic pain  2/25/2021 1544 by Dano Funes RN  Outcome: Ongoing     Problem: Fluid Volume:  Goal: Hemodynamic stability will improve  Description: Hemodynamic stability will improve  2/25/2021 1544 by Dano Funes RN  Outcome: Ongoing     Problem: Fluid Volume:  Goal: Ability to maintain a balanced intake and output will improve  Description: Ability to maintain a balanced intake and output will improve  2/25/2021 1544 by Dano Funes RN  Outcome: Ongoing     Problem: Health Behavior:  Goal: Identification of resources available to assist in meeting health care needs will improve  Description: Identification of resources available to assist in meeting health care needs will improve  2/25/2021 1544 by Dano Funes RN  Outcome: Ongoing     Problem: Respiratory:  Goal: Respiratory status will improve  Description: Respiratory status will improve  2/25/2021 1544 by Dano Funes RN  Outcome: Ongoing

## 2021-02-25 NOTE — ED NOTES
[facility] returned call for admission, Dr. Jyotsna Ponce admitting     Silvia House, RN  02/24/21 1932

## 2021-02-25 NOTE — PROGRESS NOTES
Physical Therapy    Facility/Department: [facility]  Initial Assessment    NAME: George Carson  : 1945  MRN: [id number]    Date of Service: 2021    Discharge Recommendations:  Home independently     Pt presented to ED on 21with a chief complaint of chest pain shortness of breath from his cardiologist office patient said the symptoms have gone on since he shoveled snow several days ago. Patient says it did not feel like the normal chest pain is had with heart attacks it felt more like a pressure sensation. It was relieved with oxygen and given full dose aspirin and nitroglycerin on the scene. He says it feels more like his COPD than his heart    RN reports patient is medically stable for therapy treatment this date. Chart reviewed prior to treatment and patient is agreeable for therapy. Assessment   Assessment: Pt demonstrated independence so has no further PT needs. Ed to do regular activity for prevention of sedentary complications. Prognosis: Good  Decision Making: Medium Complexity  Exam: ROM, MMT, functional mobility, activity tolerance, Balance, [employer] AM-PAC 6 Clicks Basic Mobility  Clinical Presentation: stable  PT Education: PT Role  REQUIRES PT FOLLOW UP: No  Activity Tolerance  Activity Tolerance: Patient Tolerated treatment well       Patient Diagnosis(es): The primary encounter diagnosis was Congestive heart failure, unspecified HF chronicity, unspecified heart failure type ([address] A diagnosis of Chest pain, unspecified type was also pertinent to this visit. Home Access: Level entry  Bathroom Shower/Tub: Tub/Shower unit  Home Equipment: Cane, Rolling walker  ADL Assistance: Independent  Homemaking Assistance: Independent  Homemaking Responsibilities: Yes  Ambulation Assistance: Independent  Transfer Assistance: Independent  Active : Yes  Mode of Transportation: Car  Occupation: Retired  Type of occupation: [employer]ascar   Additional Comments: denies falls  Cognition   Cognition  Overall Cognitive Status: WFL    Objective     Observation/Palpation  Posture: Good  Observation: resting in bed, appears comfortable    AROM RLE (degrees)  RLE AROM: WFL  AROM LLE (degrees)  LLE AROM : WFL  AROM RUE (degrees)  RUE AROM : WFL  AROM LUE (degrees)  LUE AROM : WFL  Strength RLE  Strength RLE: WFL  Strength LLE  Strength LLE: WFL  Strength RUE  Strength RUE: WFL  Strength LUE  Strength LUE: WFL  Tone RLE  RLE Tone: Normotonic  Tone LLE  LLE Tone: Normotonic  Motor Control  Gross Motor?: WFL  Sensation  Overall Sensation Status: WFL  Bed mobility  Rolling to Left: Supervision  Rolling to Right: Supervision  Supine to Sit: Supervision  Sit to Supine: Supervision  Transfers  Sit to Stand: Supervision  Stand to sit: Supervision  Bed to Chair: Supervision  Stand Pivot Transfers: Supervision  Car Transfer: Supervision  Ambulation  Ambulation?: Yes  More Ambulation?: Yes  Ambulation 1  Surface: level tile  Device: No Device  Assistance: Supervision  Quality of Gait: step through pattern  Distance: 25ft  Pt amb to BR for toileting, supervision to sit to toilet. Pt stood with supervision,stood 3 minutes for pericare & amb 2ft to sink for hand hygiene x 2 minutes       Ambulation 2  Surface - 2: level tile  Device 2: No device  Assistance 2:  Independent  Quality of Gait 2: step through pattern, good stability  Gait Deviations: None  Distance: 300ft     Balance  Sitting - Static: Good  Sitting - Dynamic: Good  Standing - Static: Good  Standing - Dynamic: Good  Exercises Comments: Ed purpose of PT, importance to be up OOB to maintain strength & regular activity to prevent sedentary complications    All lines intact, call light within reach, and patient positioned comfortably at end of treatment. All patient needs addressed prior to ending therapy session.       Plan   Plan  Times per week: this vist for eval & pt education  Safety Devices  Type of devices: Call light within reach, Gait belt    G-Code       OutComes Score   ,    TINETTI BALANCE ASSESSMENT TOOL    Patient name:  Nancy Murillo     Date:  2/25/2021  Completed by:  NYA Rodríguez 2    Patient is seated in hard, armless chair;  Sitting Balance     Score: [] 0 [x] 1  Leans or slides in chair = 0     Steady, safe = 1    Rises from chair     Score: [] 0 [] 1 [x] 2  Unable to without help = 0  Able, uses arms to help = 1  Able without use of arms = 2    Attempts to rise     Score: [] 0 [] 1 [x] 2  Unable to without help = 0  Able, requires > 1 attempt = 1  Able to rise, 1 attempt = 2    Immediate standing Balance (first 5 seconds) Score: [] 0 [] 1 [x] 2  Unsteady (staggers, moves feet, trunk sway) = 0  Steady but uses walker or other support = 1  Steady without walker or other support = 2    Standing balance     Score: [] 0 [] 1 [x] 2  Unsteady = 0  Steady but wide stance and uses support = 1  Narrow stance without support = 2    Nudged (sternal nudge)    Score: [] 0 [] 1 [x]   2  Begins to fall = 0  Staggers, grabs, catches self = 1  Steady = 2    Eyes closed      Score: [] 0 [x] 1  Unsteady = 0  Steady = 1    Turning 360 degrees    Score: [] 0 [x] 1  (A)  (A) Discontinuous steps = 0      (A) Continuous = 1  (B) Unsteady= 0     Score: [] 0 [x] 1  (B)  (B) Steady = 1    Sitting down     Score: [] 0 [] 1 [x] 2  Unsafe (misjudged distance, falls into chair) = 0  Uses arms or not a smooth motion = 1  Safe, smooth motion = 2    Balance Total Score    Score:      16 /16            GAIT SECTION Patient stands with therapist, walks across room (+/- aids), first at usual pace, then at rapid pace. Indication of gait (Immediately after told to [facility].) Score: [] 0 [x] 1  Any hesitancy or multiple attempts = 0  No hesitancy = 1    Step length and height    Score: [] 0 [x] L    [x] R  Step to = 0  Step through R = 1  Step through L = 1    Foot clearance     Score: [] 0 [x] L     [x] R  Foot drop = 0  L foot clears floor = 1  R foot clears floor = 1    Step symmetry     Score: [] 0 [x] 1  Right and left step length not equal = 0  Right and left step length appear equal = 1    Step continuity     Score: [] 0 [x] 1  Stopping or discontinuity between steps = 0  Steps appear continuous = 1    Path (Excursion)     Score: [] 0 [] 1 [x] 2  Marked deviation = 0  Mild/moderate deviation or uses w. aid = 1  Straight without w. aid = 2    Trunk       Score: [] 0 [] 1 [x] 2  Marked sway or uses w. aid = 0  No sway but flex. knees or back or  uses arms for stability = 1  No sway, flex. , use of arms or w. aid = 2    Walking time     Score: [] 0 [] 1  Heels apart = 0  Heels almost touching while walking = 1    Gait Total Score     Score:        12 /12              TOTAL SCORE = BALANCE + GAIT  Score:      28 /28       Score 24 or more = Low risk of falls                                                   AM-PAC Score  AM-PAC Inpatient Mobility Raw Score : 24 (02/25/21 1142)  AM-PAC Inpatient T-Scale Score : 61.14 (02/25/21 1142)  Mobility Inpatient CMS 0-100% Score: 0 (02/25/21 1142)  Mobility Inpatient CMS G-Code Modifier : [facility] (02/25/21 1142)          Goals  Short term goals  Time Frame for Short term goals: 1 visit  Short term goal 1: Pt will demonstrate independent functional mobility including gait of at least 300ft  Short term goal 2: Ed pt on importance of regular activity for prevention of sedentary complications;        Therapy Time   Individual Concurrent Group Co-treatment   Time In 1115         Time Out 2582 Minutes 33+10=43              Additional 10 minutes for chart review          [address], PT

## 2021-02-25 NOTE — PROGRESS NOTES
Discussed patient with Dr. Corinna Haile, Continue to diureses patient today and will reassesa tomorrow.

## 2021-02-25 NOTE — PLAN OF CARE
Problem: Falls - Risk of:  Goal: Will remain free from falls  Description: Will remain free from falls  Outcome: Ongoing  Note: Pt fall risk, fall band present, falling star, safety alarm activated and in use as needed. Hourly rounding performed. Pt encouraged to use call light. See Eden Naranjo fall risk assessment. Goal: Absence of physical injury  Description: Absence of physical injury  Outcome: Ongoing  Note: Non-skid socks in place, up with assistance, bed in lowest position, bed exit & alarm as needed, provide toileting every 2 hours an d as needed. Problem: Pain:  Goal: Pain level will decrease  Description: Pain level will decrease  Outcome: Ongoing  Note: Monitoring pain with each assessment and prn. HANS 0-10 pain scale utilized. Non-pharmacological measures to be encouraged prior to pharmacological measures. Goal: Control of acute pain  Description: Control of acute pain  Outcome: Ongoing  Goal: Control of chronic pain  Description: Control of chronic pain  Outcome: Ongoing     Problem: Fluid Volume:  Goal: Hemodynamic stability will improve  Description: Hemodynamic stability will improve  Outcome: Ongoing  Note: Monitored signs & symptoms of fluid overload. Maintained fluid restriction & monitored I & O. Goal: Ability to maintain a balanced intake and output will improve  Description: Ability to maintain a balanced intake and output will improve  Outcome: Ongoing  Note: Assessed for signs and symptoms of dehydration. Monitor intake and output every shift. Replace electrolytes as needed. Provided IV fluids per doctor and as needed. Problem: Health Behavior:  Goal: Identification of resources available to assist in meeting health care needs will improve  Description: Identification of resources available to assist in meeting health care needs will improve  Outcome: Ongoing  Note: Identified & provided resources to assist in meeting health care needs in hospital and/or post-discharge. Problem: Respiratory:  Goal: Respiratory status will improve  Description: Respiratory status will improve  Outcome: Ongoing  Note: Assess for adventitious breath sounds. Monitored SaO2 > 90%. Applied 02 per nasal cannula as needed. Elevated HOB to improve breathing as needed.

## 2021-02-25 NOTE — H&P
[facility]  Office: [address], DO, Reza Dillard, DO, Nenita Burgoso, DO, Keyana Selma Blood, DO, Aaliyah Garcia MD, Amparo Jackson MD, Kwaku Joseph MD, Hannah Chilel MD, Jr Pierce MD, Anny Silverman MD, Jaycee Burgos MD, Juan Carlos Valenzuela MD, Dexter Marino MD, Jose Raul Malloy, DO, Mary Trimble MD, Andreas Houston MD, Suzanna Rowley, DO, Isamar Cole MD,  Tuan Mairn, DO, Daniela Dan MD, Deysi Jo MD, Neo Crouch, [facility], [facility] Ronda, CNP, Dada Madison, CNP, María Elena Lopez, CNS, Cale Gaston, [facility], Raynell Councilman, CNP, Elissa Alarcon, CNP, Rolly Orozco, CNP, Dana Mcelroy, CNP, Jenifer Orosco PA-C, Ej Izquierdo, [facility], Dena Crooks, CNP, Vianca Iyer, CNP, Geraldine Velazquez, CNP, Tommy Collazo, CNP, Gala Navarro, [facility] 97    HISTORY AND PHYSICAL EXAMINATION            Date:   2/24/2021  Patient name:  Clifton Rust  Date of admission:  2/24/2021  2:16 PM  MRN:   [id number]  Account:  [de-identified]  YOB: 1945  PCP:    Nathen Araiza  Room:   [address] [place] [address]  Code Status:    Prior    Chief Complaint:     Chief Complaint   Patient presents with    Chest Pain     History Obtained From:     Patient and electronic medical record. History of Present Illness:     Clifton Rust is a 67 y.o. Non-/non  male who presents with Chest Pain   and is admitted to the hospital for the management of Acute on chronic diastolic heart failure ([facility] [address] Patient reports to the hospital with complaint of chest pressure. He reports that he has been experiencing intermittent left-sided chest pressure for several days. He states the onset happened after he shoveled snow. He endorses associated shortness of breath. He denies diaphoresis. He was given aspirin, sublingual nitroglycerin and supplemental oxygen. He states that his pain was greatly alleviated with these measures. He denies fever, chills, nausea or vomiting. No additional symptomology or further modifying factors. He is alert and oriented, but has difficulty with historical data. He has past medical history that includes CAD with CABG, hypertension, dyslipidemia and stomach cancer. He has past history of DVT, he is not on anticoagulation. Chart review also shows chronically elevated creatinine. He follows outpatient with Dr. Irais Lanier with cardiology, Dr. Wallace Anderson with GI and Dr. Tammy Palm with oncology. Initial chemistries: Creatinine 1.40, proBNP 8747, high-sensitivity troponin 21, 20, HBG 12.5, HCT 95.3, D-dimer 0.82    Rapid SARS-CoV-2 negative. EKG interpretation per ER physician: \"Sinus bradycardia with sinus arrhythmia rate of 50 bpm OK interval is 154 ms QRS durations 88 ms QT corrected 406 ms axis of 75 there is no acute elevation there is a little bit of nonspecific changes in the lateral inferior leads     Repeat EKG shows sinus bradycardia rate of 50bpm OK was 166 ms QRS durations 86 ms QT corrected 413 ms axis of 75 again is nonspecific lateral ST changes there is no elevation seen. \"    Chest x-ray shows postsurgical chest.  No acute cardiopulmonary disease. CAROTID        Past Surgical History:     Past Surgical History:   Procedure Laterality Date    ARTHROPLASTY      RIGHT KNEE X3-INFECTION    ARTHROPLASTY      LEFT KNEE    BRAIN TUMOR EXCISION  2008    excision    CARDIAC CATHETERIZATION      CAROTID ENDARTERECTOMY      LEFT    CERVICAL DISCECTOMY      CERVICAL SPINE SURGERY  6-25-13    ACF 3-4, 4-5    COLONOSCOPY  11/21/2018    hyperplastic polyp    COLONOSCOPY N/A 11/21/2018    COLONOSCOPY POLYPECTOMY SNARE/COLD BIOPSY performed by Zenon Diaz MD at [place] [address]  3/6/2014    CABG X 3    ENDOSCOPY, COLON, DIAGNOSTIC      INGUINAL HERNIA REPAIR      LEFT    JOINT REPLACEMENT      Bilat knees    LUMBAR DISC SURGERY      HARDWARE    OTHER SURGICAL HISTORY Left 12/12/2017     1) Arch aortography 2) Selective left subclavian angiography 3) Left subclavian artery angioplasty with 7 mm x 40 mm and 8 mm x 40 mm [place] balloons     TONSILLECTOMY      UPPER GASTROINTESTINAL ENDOSCOPY  4/30/2015    UPPER GASTROINTESTINAL ENDOSCOPY  5/18/16    lipomatous lump; esophagitis; pyogenic granuloma; mild gastritis    UPPER GASTROINTESTINAL ENDOSCOPY  01/25/2017    lipoma; prominet Brunner's gland; gastritis    UPPER GASTROINTESTINAL ENDOSCOPY  08/08/2018    CASTILLO'S    UPPER GASTROINTESTINAL ENDOSCOPY  8/8/2018    EGD BIOPSY performed by Zenon Diaz MD at [place] [address]  09/04/2019    EGD BIOPSY     UPPER GASTROINTESTINAL ENDOSCOPY N/A 9/4/2019    EGD BIOPSY performed by Zenon Diaz MD at [address] [place] [address]        Medications Prior to Admission:     Prior to Admission medications    Medication Sig Start Date End Date Taking?  Authorizing Provider   ondansetron (ZOFRAN-ODT) 4 MG disintegrating tablet DISSOLVE 1 TABLET ON THE TONGUE EVERY 8 HOURS AS NEEDED FOR NAUSEA OR VOMITING 1/11/21   Jaja Brooks MD omeprazole (PRILOSEC) 20 MG delayed release capsule TAKE ONE CAPSULE BY MOUTH DAILY 12/1/20   Kristina Reardon MD   nitroGLYCERIN (NITROSTAT) 0.4 MG SL tablet up to max of 3 total doses. If no relief after 1 dose, call 911. Patient not taking: Reported on 1/11/2021 12/3/18   Helene Saravia MD   metoprolol tartrate (LOPRESSOR) 25 MG tablet Take 0.25 tablets by mouth 2 times daily  Patient taking differently: Take 12.5 mg by mouth 2 times daily Takes half a pill = 12.5 mg twice daily 4/6/18   Elvia Ayala MD   HYDROcodone-acetaminophen [facility])  MG per tablet Take 1 tablet by mouth every 4 hours as needed for Pain . Historical Provider, MD   Ascorbic Acid (VITAMIN C) 1000 MG tablet Take 1,000 mg by mouth daily    Historical Provider, MD   ALPRAZolam (XANAX PO) Take 0.5 tablets by mouth 2 times daily as needed     Historical Provider, MD   docusate (COLACE, DULCOLAX) 100 MG CAPS Take 100 mg by mouth 2 times daily  Patient taking differently: Take 100 mg by mouth 2 times daily PRN 11/21/16   Bren Agrawal MD   albuterol (PROVENTIL HFA;VENTOLIN HFA) 108 (90 BASE) MCG/ACT inhaler Inhale 2 puffs into the lungs every 6 hours as needed for Wheezing. Historical Provider, MD   fentaNYL (DURAGESIC) 50 MCG/HR Place 1 patch onto the skin every other day. Patient wears 3 separate patches, 50 mcg each, which he changes every other day    Historical Provider, MD   aspirin 81 MG chewable tablet Take 1 tablet by mouth daily. 3/11/14   Cyn Hoskins MD        Allergies:     Vancomycin    Social History:     Tobacco:    reports that he quit smoking about 2 years ago. His smoking use included cigarettes. He has a 7.50 pack-year smoking history. He has quit using smokeless tobacco.  Alcohol:      reports no history of alcohol use. Drug Use:  reports no history of drug use.     Family History:     Family History   Problem Relation Age of Onset    Lung Cancer Father     Stroke Maternal Grandmother Review of Systems:     Positive and Negative as described in HPI. Review of Systems   Constitutional: Negative for chills, diaphoresis and fever. HENT: Negative for congestion. Eyes: Negative for visual disturbance. Respiratory: Positive for chest tightness and shortness of breath. Negative for cough and wheezing. Cardiovascular: Positive for chest pain. Negative for palpitations and leg swelling. Gastrointestinal: Negative for abdominal pain, blood in stool, constipation, diarrhea, nausea and vomiting. Endocrine: Negative for cold intolerance and heat intolerance. Genitourinary: Negative for difficulty urinating, dysuria, frequency and urgency. Musculoskeletal: Positive for arthralgias and myalgias. Chronic bilateral leg and back pain. Skin: Negative for color change and rash. Neurological: Negative for dizziness, weakness, light-headedness, numbness and headaches. Hematological: Does not bruise/bleed easily. Psychiatric/Behavioral: The patient is nervous/anxious. All other systems reviewed and are negative. Physical Exam:   BP (!) 174/68   Pulse (!) 48   Temp 97.8 °F (36.6 °C) (Oral)   Resp 16   Ht 5' 5\" (1.651 m)   Wt 147 lb 6.4 oz (66.9 kg)   SpO2 100%   BMI 24.53 kg/m²   Temp (24hrs), Av.6 °F (37 °C), Min:97.8 °F (36.6 °C), Max:99.3 °F (37.4 °C)    No results for input(s): POCGLU in the last 72 hours. Intake/Output Summary (Last 24 hours) at 2021 2352  Last data filed at 2021 1900  Gross per 24 hour   Intake    Output 500 ml   Net -500 ml       Physical Exam  Vitals signs and nursing note reviewed. Constitutional:       Appearance: He is not diaphoretic. HENT:      Head: Normocephalic and atraumatic. Right Ear: Hearing normal.      Left Ear: Hearing normal.      Nose: Nose normal. No rhinorrhea. Eyes:      General: Lids are normal.      Extraocular Movements:      Right eye: Normal extraocular motion. Left eye: Normal extraocular motion. Conjunctiva/sclera: Conjunctivae normal.      Right eye: Right conjunctiva is not injected. Left eye: Left conjunctiva is not injected. Pupils: Pupils are equal, round, and reactive to light. Pupils are equal.      Right eye: Pupil is reactive. Left eye: Pupil is reactive. Neck:      Musculoskeletal: Neck supple. Thyroid: No thyromegaly. Trachea: Trachea normal. No tracheal deviation. Cardiovascular:      Rate and Rhythm: Regular rhythm. Bradycardia present. Pulses: Normal pulses. Heart sounds: Normal heart sounds. Pulmonary:      Effort: Pulmonary effort is normal. No respiratory distress. Breath sounds: No stridor. Examination of the right-lower field reveals decreased breath sounds. Examination of the left-lower field reveals decreased breath sounds. Decreased breath sounds present. Abdominal:      General: Bowel sounds are normal. There is no distension. Palpations: Abdomen is soft. There is no mass. Tenderness: There is no abdominal tenderness. There is no guarding. Musculoskeletal:         General: No tenderness. Skin:     General: Skin is warm and dry. Neurological:      Mental Status: He is alert and oriented to person, place, and time. He is not disoriented. GCS: GCS eye subscore is 4. GCS verbal subscore is 5. GCS motor subscore is 6. Cranial Nerves: No cranial nerve deficit. Psychiatric:         Mood and Affect: Mood is anxious. Affect is tearful.          Speech: Speech normal.         Investigations:      Laboratory Testing:  Recent Results (from the past 24 hour(s))   CBC Auto Differential    Collection Time: 02/24/21  2:19 PM   Result Value Ref Range    WBC 9.0 3.5 - 11.0 k/uL    RBC 3.97 (L) 4.5 - 5.9 m/uL    Hemoglobin 12.5 (L) 13.5 - 17.5 g/dL    Hematocrit 37.8 (L) 41 - 53 %    MCV 95.3 80 - 100 fL    MCH 31.4 26 - 34 pg    MCHC 32.9 31 - 37 g/dL    RDW 16.5 (H) 12.5 - 15.4 % Platelets 741 478 - 920 k/uL    MPV 8.1 6.0 - 12.0 fL    NRBC Automated NOT REPORTED per 100 WBC    Differential Type NOT REPORTED     Seg Neutrophils 71 (H) 36 - 66 %    Lymphocytes 20 (L) 24 - 44 %    Monocytes 7 2 - 11 %    Eosinophils % 1 1 - 4 %    Basophils 1 0 - 2 %    Immature Granulocytes NOT REPORTED 0 %    Segs Absolute 6.40 1.8 - 7.7 k/uL    Absolute Lymph # 1.80 1.0 - 4.8 k/uL    Absolute Mono # 0.70 0.1 - 1.2 k/uL    Absolute Eos # 0.10 0.0 - 0.4 k/uL    Basophils Absolute 0.10 0.0 - 0.2 k/uL    Absolute Immature Granulocyte NOT REPORTED 0.00 - 0.30 k/uL    WBC Morphology NOT REPORTED     RBC Morphology NOT REPORTED     Platelet Estimate NOT REPORTED    Comprehensive Metabolic Panel    Collection Time: 02/24/21  2:19 PM   Result Value Ref Range    Glucose 110 (H) 70 - 99 mg/dL    BUN 19 8 - 23 mg/dL    CREATININE 1.40 (H) 0.70 - 1.20 mg/dL    Bun/Cre Ratio NOT REPORTED 9 - 20    Calcium 9.6 8.6 - 10.4 mg/dL    Sodium 137 135 - 144 mmol/L    Potassium 4.7 3.7 - 5.3 mmol/L    Chloride 102 98 - 107 mmol/L    CO2 26 20 - 31 mmol/L    Anion Gap 9 9 - 17 mmol/L    Alkaline Phosphatase 85 40 - 129 U/L    ALT 7 5 - 41 U/L    AST 18 <40 U/L    Total Bilirubin 0.60 0.3 - 1.2 mg/dL    Total Protein 6.9 6.4 - 8.3 g/dL    Albumin 4.2 3.5 - 5.2 g/dL    Albumin/Globulin Ratio 1.6 1.0 - 2.5    GFR Non- 49 (L) >60 mL/min    GFR African American 60 (L) >60 mL/min    GFR Comment          GFR Staging NOT REPORTED    Protime-INR    Collection Time: 02/24/21  2:19 PM   Result Value Ref Range    Protime 11.9 9.4 - 12.6 sec    INR 1.1    Troponin    Collection Time: 02/24/21  2:19 PM   Result Value Ref Range    Troponin, High Sensitivity 21 0 - 22 ng/L    Troponin T NOT REPORTED <0.03 ng/mL    Troponin Interp NOT REPORTED    Brain Natriuretic Peptide    Collection Time: 02/24/21  2:19 PM   Result Value Ref Range    Pro-BNP 8,747 (H) <300 pg/mL    BNP Interpretation Pro-BNP Reference Range: D-Dimer, Quantitative    Collection Time: 02/24/21  2:19 PM   Result Value Ref Range    D-Dimer, Quant 0.82 mg/L FEU   EKG 12 Lead    Collection Time: 02/24/21  2:24 PM   Result Value Ref Range    Ventricular Rate 50 BPM    Atrial Rate 50 BPM    P-R Interval 154 ms    QRS Duration 88 ms    Q-T Interval 446 ms    QTc Calculation (Bazett) 406 ms    P Axis 25 degrees    R Axis 75 degrees    T Axis 53 degrees   COVID-19, Rapid    Collection Time: 02/24/21  2:29 PM    Specimen: Nasopharyngeal Swab   Result Value Ref Range    Specimen Description . NASOPHARYNGEAL SWAB     SARS-CoV-2, Rapid Not Detected Not Detected   EKG 12 Lead    Collection Time: 02/24/21  4:57 PM   Result Value Ref Range    Ventricular Rate 50 BPM    Atrial Rate 50 BPM    P-R Interval 166 ms    QRS Duration 86 ms    Q-T Interval 454 ms    QTc Calculation (Bazett) 413 ms    P Axis 24 degrees    R Axis 75 degrees    T Axis 66 degrees   Troponin    Collection Time: 02/24/21  5:01 PM   Result Value Ref Range    Troponin, High Sensitivity 20 0 - 22 ng/L    Troponin T NOT REPORTED <0.03 ng/mL    Troponin Interp NOT REPORTED    EKG 12 Lead    Collection Time: 02/24/21  7:28 PM   Result Value Ref Range    Ventricular Rate 47 BPM    Atrial Rate 47 BPM    P-R Interval 152 ms    QRS Duration 78 ms    Q-T Interval 482 ms    QTc Calculation (Bazett) 426 ms    P Axis 115 degrees    R Axis 45 degrees    T Axis 94 degrees       Imaging/Diagnostics:  Xr Chest Portable    Result Date: 2/24/2021  Postsurgical chest. No acute cardiopulmonary disease.      Ct Chest Pulmonary Embolism W Contrast    Result Date: 2/24/2021 No evidence of pulmonary embolism or acute lung pathology. Overall findings suggesting decompensated CHF with mild pulmonary/interstitial edema and trace right pleural effusion. Mild right hilar lymphadenopathy could be seen in the setting of pulmonary edema. Follow-up CT chest in 3-6 months would be recommended for conservative measures to ensure resolution or stability. Assessment :      Hospital Problems           Last Modified POA    * (Principal) Acute on chronic diastolic heart failure ([address] 2/24/2021 Yes    Dyslipidemia 2/24/2021 Yes    Coronary artery disease involving coronary bypass graft of native heart 2/24/2021 Yes    Overview Signed 3/20/2014 11:19 AM by Ayesha Chandra MD     stents 2002   CABG 3/6/14         CKD (chronic kidney disease) stage 3, GFR 30-59 ml/min 2/24/2021 Yes    Chest pain 2/24/2021 Yes    Elevated d-dimer 2/24/2021 Yes        Plan:     Patient status inpatient in the  Progressive Unit/Step down    1. Consult cardiology, appreciate recommendations. 2. Trend troponin, repeat EKG in AM.   3. Diuresis. 4. Patent is bradycardic, hold metoprolol for HR <60 or SBP <100.   5. Hypertension- IV hydralazine for SBP >150.   6. Elevated d dimer- no PE, check bilateral lower extremity dopplers given previous hx of DVT. 7. CKD- avoid nephrotoxic agents, monitor renal function. 8. Dyslipidemia- check lipids. 9. Monitor vital signs. 10. Follow chemistries, check TSH.  11. Supplemental oxygen as needed. 12. DVT prophylaxis. 13. Telemetry. 14. Lengthy discussion regarding fentanyl patch. The patient states he has been wearing multiple fentanyl patches for 9 years. Although he is prescribed 1 50mcg patch q48hrs. 15. Continue prescribed home pain regimen. 16. NPO after midnight, pending cardiology eval.  17. Activity as tolerated with assist.  18. PT/OT. Plan of care discussed in room with patient and Janae Robertson.      Consultations:   IP CONSULT TO HEART FAILURE NURSE/COORDINATOR IP CONSULT TO DIETITIAN  IP CONSULT TO CARDIOLOGY    Patient is admitted as inpatient status because of co-morbidities listed above, severity of signs and symptoms as outlined, requirement for current medical therapies and most importantly because of direct risk to patient if care not provided in a hospital setting. Expected length of stay > 48 hours.     JOSELINE Villalobos - CNP  2/24/2021  11:57 PM    Copy sent to Dr. Skyler Ibanez     (Please note that portions of this note were completed with a voice recognition program. Efforts were made to edit the dictations but occasionally words are mis-transcribed.)

## 2021-02-26 VITALS
BODY MASS INDEX: 24.38 KG/M2 | TEMPERATURE: 97.9 F | WEIGHT: 146.31 LBS | DIASTOLIC BLOOD PRESSURE: 69 MMHG | OXYGEN SATURATION: 99 % | HEIGHT: 65 IN | RESPIRATION RATE: 18 BRPM | HEART RATE: 47 BPM | SYSTOLIC BLOOD PRESSURE: 130 MMHG

## 2021-02-26 LAB
ANION GAP SERPL CALCULATED.3IONS-SCNC: 7 MMOL/L (ref 9–17)
BUN BLDV-MCNC: 25 MG/DL (ref 8–23)
BUN/CREAT BLD: 16 (ref 9–20)
CALCIUM SERPL-MCNC: 8.9 MG/DL (ref 8.6–10.4)
CHLORIDE BLD-SCNC: 104 MMOL/L (ref 98–107)
CO2: 30 MMOL/L (ref 20–31)
CREAT SERPL-MCNC: 1.55 MG/DL (ref 0.7–1.2)
GFR AFRICAN AMERICAN: 53 ML/MIN
GFR NON-AFRICAN AMERICAN: 44 ML/MIN
GFR SERPL CREATININE-BSD FRML MDRD: ABNORMAL ML/MIN/{1.73_M2}
GFR SERPL CREATININE-BSD FRML MDRD: ABNORMAL ML/MIN/{1.73_M2}
GLUCOSE BLD-MCNC: 102 MG/DL (ref 70–99)
POTASSIUM SERPL-SCNC: 3.9 MMOL/L (ref 3.7–5.3)
SODIUM BLD-SCNC: 141 MMOL/L (ref 135–144)

## 2021-02-26 PROCEDURE — 6370000000 HC RX 637 (ALT 250 FOR IP): Performed by: NURSE PRACTITIONER

## 2021-02-26 PROCEDURE — 6360000002 HC RX W HCPCS: Performed by: INTERNAL MEDICINE

## 2021-02-26 PROCEDURE — 2060000000 HC ICU INTERMEDIATE R&B

## 2021-02-26 PROCEDURE — 80048 BASIC METABOLIC PNL TOTAL CA: CPT

## 2021-02-26 PROCEDURE — 6370000000 HC RX 637 (ALT 250 FOR IP): Performed by: INTERNAL MEDICINE

## 2021-02-26 PROCEDURE — 36415 COLL VENOUS BLD VENIPUNCTURE: CPT

## 2021-02-26 PROCEDURE — 2580000003 HC RX 258: Performed by: INTERNAL MEDICINE

## 2021-02-26 PROCEDURE — 99239 HOSP IP/OBS DSCHRG MGMT >30: CPT | Performed by: INTERNAL MEDICINE

## 2021-02-26 RX ORDER — ATORVASTATIN CALCIUM 40 MG/1
40 TABLET, FILM COATED ORAL NIGHTLY
Status: DISCONTINUED | OUTPATIENT
Start: 2021-02-26 | End: 2021-02-27 | Stop reason: HOSPADM

## 2021-02-26 RX ORDER — FUROSEMIDE 40 MG/1
40 TABLET ORAL DAILY
Qty: 60 TABLET | Refills: 3 | Status: SHIPPED | OUTPATIENT
Start: 2021-02-26 | End: 2021-11-09 | Stop reason: ALTCHOICE

## 2021-02-26 RX ORDER — ATORVASTATIN CALCIUM 40 MG/1
40 TABLET, FILM COATED ORAL NIGHTLY
Qty: 30 TABLET | Refills: 3 | Status: SHIPPED | OUTPATIENT
Start: 2021-02-26 | End: 2021-11-09 | Stop reason: ALTCHOICE

## 2021-02-26 RX ADMIN — OXYCODONE HYDROCHLORIDE AND ACETAMINOPHEN 1000 MG: 500 TABLET ORAL at 09:30

## 2021-02-26 RX ADMIN — ENOXAPARIN SODIUM 40 MG: 40 INJECTION SUBCUTANEOUS at 09:30

## 2021-02-26 RX ADMIN — HYDROCODONE BITARTRATE AND ACETAMINOPHEN 1 TABLET: 10; 325 TABLET ORAL at 09:26

## 2021-02-26 RX ADMIN — SODIUM CHLORIDE, PRESERVATIVE FREE 10 ML: 5 INJECTION INTRAVENOUS at 09:30

## 2021-02-26 RX ADMIN — ALPRAZOLAM 0.5 MG: 0.5 TABLET ORAL at 09:25

## 2021-02-26 RX ADMIN — PANTOPRAZOLE SODIUM 40 MG: 40 TABLET, DELAYED RELEASE ORAL at 06:40

## 2021-02-26 RX ADMIN — METOPROLOL TARTRATE 12.5 MG: 25 TABLET, FILM COATED ORAL at 09:30

## 2021-02-26 RX ADMIN — HYDROCODONE BITARTRATE AND ACETAMINOPHEN 1 TABLET: 10; 325 TABLET ORAL at 14:34

## 2021-02-26 RX ADMIN — ASPIRIN 81 MG: 81 TABLET, CHEWABLE ORAL at 09:29

## 2021-02-26 RX ADMIN — DOCUSATE SODIUM 100 MG: 100 CAPSULE, LIQUID FILLED ORAL at 09:30

## 2021-02-26 RX ADMIN — FUROSEMIDE 40 MG: 10 INJECTION, SOLUTION INTRAMUSCULAR; INTRAVENOUS at 09:30

## 2021-02-26 ASSESSMENT — PAIN SCALES - GENERAL: PAINLEVEL_OUTOF10: 5

## 2021-02-26 NOTE — PROGRESS NOTES
Occupational Therapy  [facility]  Occupational Therapy Not Seen Note    Patient not available for Occupational Therapy due to:    [] Testing:    [] Hemodialysis    [] Cancelled by RN:    []Refusal by Patient:    [] Surgery:     [] Intubation:     [] Pain Medication:    [] Sedation:     [] Spine Precautions :    [] Medical Instability:    [x] Other: D/C OT, Pt is independent    Pepe Vaughn, OTR/L

## 2021-02-26 NOTE — CONSULTS
Past Surgical History:   has a past surgical history that includes Cardiac catheterization; Carotid endarterectomy; Cervical discectomy; Lumbar disc surgery; arthroplasty; arthroplasty; Tonsillectomy; Inguinal hernia repair; Brain tumor excision (2008); Cervical spine surgery (6-25-13); Coronary artery bypass graft (3/6/2014); Upper gastrointestinal endoscopy (4/30/2015); Upper gastrointestinal endoscopy (5/18/16); Upper gastrointestinal endoscopy (01/25/2017); joint replacement; other surgical history (Left, 12/12/2017); Upper gastrointestinal endoscopy (08/08/2018); Upper gastrointestinal endoscopy (8/8/2018); Colonoscopy (11/21/2018); Colonoscopy (N/A, 11/21/2018); Endoscopy, colon, diagnostic; Upper gastrointestinal endoscopy (09/04/2019); and Upper gastrointestinal endoscopy (N/A, 9/4/2019). Home Medications:    Prior to Admission medications    Medication Sig Start Date End Date Taking? Authorizing Provider   ondansetron (ZOFRAN-ODT) 4 MG disintegrating tablet DISSOLVE 1 TABLET ON THE TONGUE EVERY 8 HOURS AS NEEDED FOR NAUSEA OR VOMITING 1/11/21   Bren Agrawal MD   omeprazole (PRILOSEC) 20 MG delayed release capsule TAKE ONE CAPSULE BY MOUTH DAILY 12/1/20   Maria D Campbell MD   nitroGLYCERIN (NITROSTAT) 0.4 MG SL tablet up to max of 3 total doses. If no relief after 1 dose, call 911. Patient not taking: Reported on 1/11/2021 12/3/18   Corazon Curran MD   metoprolol tartrate (LOPRESSOR) 25 MG tablet Take 0.25 tablets by mouth 2 times daily  Patient taking differently: Take 12.5 mg by mouth 2 times daily Takes half a pill = 12.5 mg twice daily 4/6/18   Heena Smith MD   HYDROcodone-acetaminophen [facility])  MG per tablet Take 1 tablet by mouth every 4 hours as needed for Pain .     Historical Provider, MD   Ascorbic Acid (VITAMIN C) 1000 MG tablet Take 1,000 mg by mouth daily    Historical Provider, MD · Neurological: No headache, diplopia, change in muscle strength, numbness or tingling. No change in gait, balance, coordination, mood, affect, memory, mentation, behavior. · Psychiatric: No anxiety, or depression. · Endocrine: No temperature intolerance. No excessive thirst, fluid intake, or urination. No tremor. · Hematologic/Lymphatic: No abnormal bruising or bleeding, blood clots or swollen lymph nodes. · Allergic/Immunologic: No nasal congestion or hives. PHYSICAL EXAM:    Physical Examination:    /69   Pulse (!) 47   Temp 97.9 °F (36.6 °C)   Resp 18   Ht 5' 5\" (1.651 m)   Wt 146 lb 5 oz (66.4 kg)   SpO2 99%   BMI 24.35 kg/m²    Constitutional and General Appearance: alert, cooperative, no distress and appears stated age  HEENT: PERRL, no cervical lymphadenopathy. No masses palpable. Normal oral mucosa  Respiratory:  · Normal excursion and expansion without use of accessory muscles  · Resp Auscultation: Good respiratory effort. Yes for increased work of breathing. On auscultation: clear to auscultation bilaterally  Cardiovascular:  · The apical impulse is not displaced  · Heart tones are crisp and normal. regular S1 and S2.  · Jugular venous pulsation Normal  · The carotid upstroke is normal in amplitude and contour without delay or bruit  · Peripheral pulses are symmetrical and full   Abdomen:  · No masses or tenderness  · Bowel sounds present  Extremities:  ·  No Cyanosis or Clubbing  ·  Lower extremity edema: No  ·  Skin: Warm and dry  Neurological:  · Alert and oriented. · Moves all extremities well  · No abnormalities of mood, affect, memory, mentation, or behavior are noted    DATA:    Diagnostics:      EKG: normal EKG, normal sinus rhythm, unchanged from previous tracings.     Labs:     CBC:   Recent Labs     02/24/21  1419 02/25/21  0548   WBC 9.0 8.4   HGB 12.5* 11.7*   HCT 37.8* 35.9*    170     BMP:   Recent Labs     02/24/21  1419 02/26/21  0741    141   K 4.7 3.9 CO2 26 30   BUN 19 25*   CREATININE 1.40* 1.55*   LABGLOM 49* 44*   GLUCOSE 110* 102*     BNP: No results for input(s): BNP in the last 72 hours. PT/INR:   Recent Labs     02/24/21  1419   PROTIME 11.9   INR 1.1     APTT:No results for input(s): APTT in the last 72 hours. CARDIAC ENZYMES:No results for input(s): CKTOTAL, CKMB, CKMBINDEX, TROPONINI in the last 72 hours. FASTING LIPID PANEL:  Lab Results   Component Value Date    HDL 35 02/25/2021    TRIG 60 02/25/2021     LIVER PROFILE:  Recent Labs     02/24/21  1419   AST 18   ALT 7   LABALBU 4.2         IMPRESSION:    Patient Active Problem List   Diagnosis    Kyphosis of cervical region    ST elevation myocardial infarction (STEMI) of inferoposterior wall (HCC)    Cardiogenic shock (HCC)    Dyslipidemia    Smoker    Coronary artery disease involving coronary bypass graft of native heart    CKD (chronic kidney disease) stage 3, GFR 30-59 ml/min    S/P CABG (coronary artery bypass graft)    Anemia due to gastrointestinal blood loss    Radiculopathy    Ex-smoker    Nausea and vomiting    GERD (gastroesophageal reflux disease)    Dysphagia    MALT (mucosa associated lymphoid tissue) (HCC)    Pulmonary infiltrate    Gastritis    Pyogenic granuloma    Esophagitis    Lipoma    Brunner's gland adenoma    Left-sided carotid artery disease (HCC)    Subclavian artery stenosis, left (HCC)    Skull mass    Chest pain    Tracy's esophagus    Colon polyp    Acute on chronic diastolic heart failure (HCC)    Elevated d-dimer       RECOMMENDATIONS:  1. Acute diastolic heart failure  2. Cad sp cabg  3. Hypertension  4. Dyslipidemia  Plan  Continue diuresis. Continue metoprolol and aspirin  Atorvastatin for cad. Discussed with patient and nursing.     Rajeev Wilburn MD  [facility] [place] cardiology

## 2021-02-26 NOTE — PROGRESS NOTES
Nutrition Assessment     Type and Reason for Visit: Positive Nutrition Screen, Consult(Difficulty chewing or swallowing food. Nutrition consult: diet education)    Nutrition Recommendations/Plan:   1. Consider adding Low Sodium diet restriction  2. Heart Failure Nutrition Therapy handout provided     Nutrition Assessment:  Patient admission is related to heart failure. Nutrition consult received for diet education. Heart Failure Nutrition Therapy diet education handout provided to patient with a brief explanation. Patient reports his appetite is fine. Muscle Milk cartons are noted on bedside table. Patient was offered Ensure Enlive if desired. Patient denied any issues with chewing or swallowing. Consider adding Low Sodium diet restriction. Patient is at low nutrition risk. Malnutrition Assessment:  Malnutrition Status: No malnutrition    Estimated Daily Nutrient Needs:  Energy (kcal): 1580 kcal based on Edmunds-St. Jeor (1.2 factor); Weight Used for Energy Requirements:  Current     Protein (g): 80-86 gm based on 1.2-1.3 gm/kg; Weight Used for Protein Requirements:  Current          Nutrition Related Findings: +2 pitting BLE edema      Current Nutrition Therapies:    DIET GENERAL;     Anthropometric Measures:  · Height: 5' 5\" (165.1 cm)  · Current Body Wt: 146 lb (66.2 kg)   · BMI: 24.3    Nutrition Diagnosis:   No nutrition diagnosis at this time    Nutrition Interventions:   Food and/or Nutrient Delivery:  Continue Current Diet  Nutrition Education/Counseling:  Education completed   Coordination of Nutrition Care:  Continue to monitor while inpatient    Goals:  PO intakes are greater than 75% at meals       Nutrition Monitoring and Evaluation:   Food/Nutrient Intake Outcomes:  Food and Nutrient Intake  Physical Signs/Symptoms Outcomes:  Biochemical Data, Skin, Weight     Discharge Planning:    Continue current diet         Almita HOPKINSN, RDN, LDN  Lead Clinical Dietitian RD Office Phone [phone]

## 2021-02-26 NOTE — DISCHARGE INSTR - COC
Continuity of Care Form    Patient Name: Talya Wood   :  1945  MRN:  [id number]    Admit date:  2021  Discharge date:  ***    Code Status Order: Full Code   Advance Directives:   Advance Care Flowsheet Documentation     Date/Time Healthcare Directive Type of Healthcare Directive Copy in [address] Agent's Name Healthcare Agent's Phone Number    21 [phone]  Yes, patient has an advance directive for healthcare treatment  Living will;Durable power of  for health care  No, copy requested from medical records  Spouse  Deysi Paulino (wife)  [phone]          Admitting Physician:  Missy Knox MD  PCP: Tomas Davila    Discharging Nurse: [facility] Unit/Room#: 0113/6540-14  Discharging Unit Phone Number: ***    Emergency Contact:   Extended Emergency Contact Information  Primary Emergency Contact: [facility]  Address: [address] Phone: [phone]  Relation: Spouse    Past Surgical History:  Past Surgical History:   Procedure Laterality Date    ARTHROPLASTY      RIGHT KNEE X3-INFECTION    ARTHROPLASTY      LEFT KNEE    BRAIN TUMOR EXCISION      excision    CARDIAC CATHETERIZATION      CAROTID ENDARTERECTOMY      LEFT    CERVICAL DISCECTOMY      CERVICAL SPINE SURGERY  13    ACF 3-4, 4-5    COLONOSCOPY  2018    hyperplastic polyp    COLONOSCOPY N/A 2018    COLONOSCOPY POLYPECTOMY SNARE/COLD BIOPSY performed by Raul Maravilla MD at [address]  3/6/2014    CABG X 3    ENDOSCOPY, COLON, DIAGNOSTIC      INGUINAL HERNIA REPAIR      LEFT    JOINT REPLACEMENT      Bilat knees    LUMBAR DISC SURGERY      HARDWARE    OTHER SURGICAL HISTORY Left 2017     1) Arch aortography 2) Selective left subclavian angiography 3) Left subclavian artery angioplasty with 7 mm x 40 mm and 8 mm x 40 mm [place] balloons  TONSILLECTOMY      UPPER GASTROINTESTINAL ENDOSCOPY  4/30/2015    UPPER GASTROINTESTINAL ENDOSCOPY  5/18/16    lipomatous lump; esophagitis; pyogenic granuloma; mild gastritis    UPPER GASTROINTESTINAL ENDOSCOPY  01/25/2017    lipoma; prominet Brunner's gland; gastritis    UPPER GASTROINTESTINAL ENDOSCOPY  08/08/2018    CASTILLO'S    UPPER GASTROINTESTINAL ENDOSCOPY  8/8/2018    EGD BIOPSY performed by Barrett Do MD at [place] [address]  09/04/2019    EGD BIOPSY     UPPER GASTROINTESTINAL ENDOSCOPY N/A 9/4/2019    EGD BIOPSY performed by Barrett Do MD at [address] [place] [address] History:   Immunization History   Administered Date(s) Administered    Influenza, Quadv, 6 mo and older, IM, PF (Flulaval, Fluarix) 12/03/2018    Pneumococcal Conjugate 13-valent ([address]) 12/03/2018       Active Problems:  Patient Active Problem List   Diagnosis Code    Kyphosis of cervical region M40.202    ST elevation myocardial infarction (STEMI) of inferoposterior wall ([facility]) I21.19    Cardiogenic shock ([facility]) R57.0    Dyslipidemia E78.5    Smoker F17.200    Coronary artery disease involving coronary bypass graft of native heart I25.810    CKD (chronic kidney disease) stage 3, GFR 30-59 ml/min N18.30    S/P CABG (coronary artery bypass graft) Z95.1    Anemia due to gastrointestinal blood loss D50.0    Radiculopathy M54.10    Ex-smoker Z87.891    Nausea and vomiting R11.2    GERD (gastroesophageal reflux disease) K21.9    Dysphagia R13.10    MALT (mucosa associated lymphoid tissue) ([facility]) C88.4    Pulmonary infiltrate R91.8    Gastritis K29.70    Pyogenic granuloma L98.0    Esophagitis K20.90    Lipoma D17.9    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    Left-sided carotid artery disease ([facility] [address] I77.9    Subclavian artery stenosis, left ([facility]) I77.1    Skull mass M89.8X8    Chest pain R07.9    Castillo's esophagus K22.70    Colon polyp K63.5  Acute on chronic diastolic heart failure (HCC) I50.33    Elevated d-dimer R79.89       Isolation/Infection:   Isolation          No Isolation        Patient Infection Status     None to display          Nurse Assessment:  Last Vital Signs: /69   Pulse (!) 47   Temp 97.9 °F (36.6 °C)   Resp 18   Ht 5' 5\" (1.651 m)   Wt 146 lb 5 oz (66.4 kg)   SpO2 99%   BMI 24.35 kg/m²     Last documented pain score (0-10 scale): Pain Level: 5  Last Weight:   Wt Readings from Last 1 Encounters:   21 146 lb 5 oz (66.4 kg)     Mental Status:  {IP PT MENTAL STATUS:}    IV Access:  { PERLITA IV ACCESS:226666159}    Nursing Mobility/ADLs:  Walking   {CHP DME ARKW:468256260}  Transfer  {CHP DME YYEZ:807357049}  Bathing  {CHP DME PRHE:372832674}  Dressing  {CHP DME KMFE:438730968}  Toileting  {CHP DME BAPQ:675795521}  Feeding  {CHP DME GMNT:521061636}  Med Admin  {CHP DME TVUB:824294294}  Med Delivery   { PERLTIA MED Delivery:756150952}    Wound Care Documentation and Therapy:        Elimination:  Continence:   · Bowel: {YES / HO:29660}  · Bladder: {YES / AD:30351}  Urinary Catheter: {Urinary Catheter:982549946}   Colostomy/Ileostomy/Ileal Conduit: {YES / H}       Date of Last BM: ***    Intake/Output Summary (Last 24 hours) at 2021 1842  Last data filed at 2021 1152  Gross per 24 hour   Intake    Output 500 ml   Net -500 ml     I/O last 3 completed shifts:  In: -   Out: 500 [Urine:500]    Safety Concerns:     [employer] Safety Concerns:351817885}    Impairments/Disabilities:      [employer] Impairments/Disabilities:290589588}    Nutrition Therapy:  Current Nutrition Therapy:   [employer] Diet List:063176226}    Routes of Feeding: {CHP DME Other Feedings:361324311}  Liquids: {Slp liquid thickness:72120}  Daily Fluid Restriction: {CHP DME Yes amt example:212919074}  Last Modified Barium Swallow with Video (Video Swallowing Test): {Done Not Done BSPU:366864035}    Treatments at the Time of Hospital Discharge: Respiratory Treatments: ***  Oxygen Therapy:  {Therapy; copd oxygen:50034}  Ventilator:    {MH CC Vent NYLM:316267105}    Rehab Therapies: {THERAPEUTIC INTERVENTION:0386148970}  Weight Bearing Status/Restrictions: 50Tayla CORREA Weight Bearin}  Other Medical Equipment (for information only, NOT a DME order):  {EQUIPMENT:392744714}  Other Treatments: ***    Patient's personal belongings (please select all that are sent with patient):  {CHP DME Belongings:245382712}    RN SIGNATURE:  {Esignature:778662879}    CASE MANAGEMENT/SOCIAL WORK SECTION    Inpatient Status Date: ***    Readmission Risk Assessment Score:  Readmission Risk              Risk of Unplanned Readmission:        18           Discharging to Facility/ Agency   · Name:   · Address:  · Phone:  · Fax:    Dialysis Facility (if applicable)   · Name:  · Address:  · Dialysis Schedule:  · Phone:  · Fax:    / signature: {Esignature:469194886}    PHYSICIAN SECTION    Prognosis: {Prognosis:9091765932}    Condition at Discharge: 50Tayla Ornelas Patient Condition:678731525}    Rehab Potential (if transferring to Rehab): {Prognosis:7345535439}    Recommended Labs or Other Treatments After Discharge: ***    Physician Certification: I certify the above information and transfer of Jefe Wolf  is necessary for the continuing treatment of the diagnosis listed and that he requires {Admit to Appropriate Level of Care:34357} for {GREATER/LESS:146456712} 30 days.      Update Admission H&P: {CHP DME Changes in [facility]:073901389}    PHYSICIAN SIGNATURE:  {Esignature:487559587}

## 2021-02-26 NOTE — DISCHARGE INSTR - DIET
? Good nutrition is important when healing from an illness, injury, or surgery. Follow any nutrition recommendations given to you during your hospital stay. ? If you were given an oral nutrition supplement while in the hospital, continue to take this supplement at home. You can take it with meals, in-between meals, and/or before bedtime. These supplements can be purchased at most local grocery stores, pharmacies, and chain [employer]-stores. ? If you have any questions about your diet or nutrition, call the hospital and ask for the dietitian.       Cardiac Diet/ Low Sodium

## 2021-02-26 NOTE — PROGRESS NOTES
[facility]  Office: [address], DO, Catie Gonzalez, DO, Pavanale Quiroga, DO, Emilia Boyd Clay, DO, Allison Koenig MD, Regine Rogers MD, Sukhdev Madrid MD, Shira Barros MD, Margy Mccormick MD, Isidro Price MD, Ann Tate MD, Fartun Hernandez MD, Dexter Quach MD, Jonathan Barrett DO, Placido Jackson MD, Nkechi Couch MD, Abundio Manley, DO, Rae Helms MD,  Ronen Rodas DO, Pepe Mccord MD, Ritika Hernandez MD, Don Pizarro, [facility], [facility] Gadiel, CNP, Edil Higgins, CNP, Vijaya Pro, CNS, Cortes Fernández, CNP, Tal Tate, CNP, Brennon Wiggins, CNP, Ericka Escobar, CNP, Brady Roque, CNP, Quincy Wong PA-C, Judy Bautista, [facility], Akilah Farr, CNP, Jomar Fish, CNP, Joanna Franks, CNP, Radha Hodges, CNP, Al[facility]    Progress Note    2/26/2021    10:30 AM    Name:   Jefe Wolf  MRN:     [id number]     Acct:      [de-identified]   Room:   0045/6969-82   Day:  2  Admit Date:  2/24/2021  2:16 PM    PCP:   Yohana Menendez  Code Status:  Full Code    Subjective:     C/C:   Chief Complaint   Patient presents with    Chest Pain     Interval History Status: not changed. Having some anxiety this AM  No other complaints  States sob improved  Discussed with RN  Cardiology evaluation in progress    Brief History:     Jefe Wolf is a 67 y.o. M with hx of CAD s/p CABG, HTN, HLD, CKD who presents to the hospital with complaint of chest pressure. He reports that he has been experiencing intermittent left-sided chest pressure for several days. He states the onset happened after he shoveled snow. He endorses associated shortness of breath. He denies diaphoresis. He was given aspirin, sublingual nitroglycerin and supplemental oxygen. He states that his pain was greatly alleviated with these measures. He denies fever, chills, nausea or vomiting. In ER troponin negative, EKG sinus bradycardia. CT chest negative for PE, demonstrating CHF. Admitted for CHF     Review of Systems:     Constitutional:  negative for chills, fevers, sweats  Respiratory:  negative for cough, dyspnea on exertion, positive for sob   Cardiovascular:  negative for chest pain, chest pressure/discomfort  Gastrointestinal:  negative for abdominal pain, nausea, vomiting  Neurological:  negative for dizziness, headache    Medications: Allergies: Allergies   Allergen Reactions    Vancomycin      AFFECTS KIDNEYS         Current Meds:   Scheduled Meds:    sodium chloride flush  10 mL Intravenous 2 times per day    enoxaparin  40 mg Subcutaneous Daily    furosemide  40 mg Intravenous BID    vitamin C  1,000 mg Oral Daily    aspirin  81 mg Oral Daily    docusate sodium  100 mg Oral BID    pantoprazole  40 mg Oral QAM AC    fentaNYL  1 patch Transdermal Q48H    metoprolol tartrate  12.5 mg Oral BID     Continuous Infusions:   PRN Meds: sodium chloride flush, nicotine, promethazine **OR** ondansetron, polyethylene glycol, acetaminophen **OR** acetaminophen, albuterol sulfate HFA, HYDROcodone-acetaminophen, hydrALAZINE, ALPRAZolam, sodium chloride flush    Data:     Past Medical History:   has a past medical history of Arthritis, Tracy's esophagus, Brunner's gland adenoma, CAD (coronary artery disease), Cancer ([facility] [address] CKD (chronic kidney disease), Colon polyp, Dysphagia, Esophagitis, Gastritis, GERD (gastroesophageal reflux disease), Hx of blood clots, Hyperlipidemia, Hypertension, Lipoma, Myocardial infarction ([facility] [address] Nausea & vomiting, Pyogenic granuloma, and Vascular abnormality. Social History:   reports that he quit smoking about 2 years ago. His smoking use included cigarettes. He has a 7.50 pack-year smoking history. He has quit using smokeless tobacco. He reports that he does not drink alcohol or use drugs.      Family History:   Family History Problem Relation Age of Onset    Lung Cancer Father     Stroke Maternal Grandmother        Vitals:  BP (!) 153/62   Pulse (!) 47   Temp 98 °F (36.7 °C) (Oral)   Resp 18   Ht 5' 5\" (1.651 m)   Wt 146 lb 5 oz (66.4 kg)   SpO2 96%   BMI 24.35 kg/m²   Temp (24hrs), Av.2 °F (36.8 °C), Min:98 °F (36.7 °C), Max:98.4 °F (36.9 °C)    No results for input(s): POCGLU in the last 72 hours. I/O (24Hr):   No intake or output data in the 24 hours ending 21 1030    Labs:  Hematology:  Recent Labs     21  0548   WBC 9.0 8.4   RBC 3.97* 3.78*   HGB 12.5* 11.7*   HCT 37.8* 35.9*   MCV 95.3 95.0   MCH 31.4 31.0   MCHC 32.9 32.6   RDW 16.5* 15.1*    170   MPV 8.1 9.8   INR 1.1  --    DDIMER 0.82  --      Chemistry:  Recent Labs     21  17021  0548 21  0741     --   --  141   K 4.7  --   --  3.9     --   --  104   CO2 26  --   --  30   GLUCOSE 110*  --   --  102*   BUN 19  --   --  25*   CREATININE 1.40*  --   --  1.55*   MG  --   --  1.8  --    ANIONGAP 9  --   --  7*   LABGLOM 49*  --   --  44*   GFRAA 60*  --   --  53*   CALCIUM 9.6  --   --  8.9   PROBNP 8,747*  --  13,781*  --    TROPHS 21 20 31*  --      Recent Labs     21  0548   PROT 6.9  --    LABALBU 4.2  --    TSH  --  1.52   AST 18  --    ALT 7  --    ALKPHOS 85  --    BILITOT 0.60  --    CHOL  --  90   HDL  --  35*   LDLCHOLESTEROL  --  43   CHOLHDLRATIO  --  2.6   TRIG  --  60   VLDL  --  NOT REPORTED     ABG:  Lab Results   Component Value Date    POCPH 7.35 2014    POCPCO2 43 2014    POCPO2 85 2014    POCHCO3 23.8 2014    NBEA 2 2014    PBEA NOT REPORTED 2014    LEV5HAP 25 2014    CHZB1OAW 96 2014    FIO2 30.0 2014     Lab Results   Component Value Date/Time    SPECIAL NOT REPORTED 2015 10:28 AM     No results found for: CULTURE    Radiology:  Xr Chest (2 Vw)    Result Date: 2021

## 2021-02-27 NOTE — DISCHARGE SUMMARY
[facility]  Office: [address], DO, Clarita Purdy, DO, Molly Alex, DO, Tima Husain Blood, DO, Melissa Barrow MD, Kary Trammell MD, Xochilt Odonnell MD, Meg Davis MD, Deyvi Littlejohn MD, Amarilys Hood MD, Nghia Sales MD, Mitra Maravilla MD, Dexter Decker MD, Christopher Reid DO, Angela Shelton MD, Stacey Ramirez DO, Francis Cordova MD,  Galdino Hartman DO, Kp Vasques MD, Macy Vidal MD, Mirian [facility], [facility] Ronda, CNP, Yasmin Farfan, CNP, Juve Odonnell, CNS, Felecia Stallworth, CNP, Priti Carvalho, CNP, Jan Ortiz, CNP, Freda Perkins, CNP, Salome Nicole, CNP, Annalise Guzman PA-C, Julianne Wisdom, [facility], Jules Trivedi, CNP, César Grant, CNP, Zoila Calvin, CNP, Anushka Hartman, CNP, Liborio Viera, CNP, Lisa Perales, [facility]    Discharge Summary     Patient ID: Marc Ledbetter  :     MRN: [id number]     ACCOUNT:  [de-identified]   Patient's PCP: Santi Mendez Date: 2021   Discharge Date: 2021  Length of Stay: 2  Code Status:  Prior  Admitting Physician: Nghia Sales MD  Discharge Physician: Nghia Sales MD     Active Discharge Diagnoses:     Hospital Problem Lists:  Principal Problem:    Acute on chronic diastolic heart failure [facility])  Active Problems:    Dyslipidemia    Coronary artery disease involving coronary bypass graft of native heart    CKD (chronic kidney disease) stage 3, GFR 30-59 ml/min    Chest pain    Elevated d-dimer  Resolved Problems:    Congestive heart failure [facility])      Admission Condition:  fair     Discharged Condition: stable    Hospital Stay:     Hospital Course: GFR      02/26/2021    GFR NOT REPORTED 02/26/2021    PROT 6.9 02/24/2021    CALCIUM 8.9 02/26/2021     PT/INR:    Lab Results   Component Value Date    PROTIME 11.9 02/24/2021    INR 1.1 02/24/2021     PTT:   Lab Results   Component Value Date    APTT 29.9 12/12/2017     FLP:    Lab Results   Component Value Date    CHOL 90 02/25/2021    TRIG 60 02/25/2021    HDL 35 02/25/2021     U/A:    Lab Results   Component Value Date    COLORU YELLOW 04/13/2014    TURBIDITY CLEAR 04/13/2014    SPECGRAV 1.016 04/13/2014    HGBUR NEGATIVE 04/13/2014    PHUR 6.0 04/13/2014    PROTEINU NEGATIVE 04/13/2014    GLUCOSEU NEGATIVE 04/13/2014    KETUA NEGATIVE 04/13/2014    BILIRUBINUR NEGATIVE 04/13/2014    UROBILINOGEN Normal 04/13/2014    NITRU NEGATIVE 04/13/2014    LEUKOCYTESUR NEGATIVE 04/13/2014     TSH:    Lab Results   Component Value Date    TSH 1.52 02/25/2021        Radiology:  Xr Chest (2 Vw)    Result Date: 2/25/2021  Mild pulmonary/interstitial edema with trace right pleural effusion. Xr Chest Portable    Result Date: 2/24/2021  Postsurgical chest. No acute cardiopulmonary disease. Ct Chest Pulmonary Embolism W Contrast    Result Date: 2/24/2021  No evidence of pulmonary embolism or acute lung pathology. Overall findings suggesting decompensated CHF with mild pulmonary/interstitial edema and trace right pleural effusion. Mild right hilar lymphadenopathy could be seen in the setting of pulmonary edema. Follow-up CT chest in 3-6 months would be recommended for conservative measures to ensure resolution or stability. Consultations:    Consults:     Final Specialist Recommendations/Findings:   IP CONSULT TO HEART FAILURE NURSE/COORDINATOR  IP CONSULT TO DIETITIAN  IP CONSULT TO CARDIOLOGY      The patient was seen and examined on day of discharge and this discharge summary is in conjunction with any daily progress note from day of discharge.     Discharge plan:     Disposition: Home    [facility] [id number] [address]  [place]  [phone]    Schedule an appointment as soon as possible for a visit      Sindhu Donis MD  [address] [facility]  [phone]    Schedule an appointment as soon as possible for a visit in 2 weeks         Requiring Further Evaluation/Follow Up POST HOSPITALIZATION/Incidental Findings:     Diet: regular diet    Activity: As tolerated    Instructions to Patient: follow up with cardiologist     Discharge Medications:      Medication List      START taking these medications    atorvastatin 40 MG tablet  Commonly known as: LIPITOR  Take 1 tablet by mouth nightly     furosemide 40 MG tablet  Commonly known as: Lasix  Take 1 tablet by mouth daily        CHANGE how you take these medications    docusate 100 MG Caps  Commonly known as: COLACE, DULCOLAX  Take 100 mg by mouth 2 times daily  What changed: additional instructions     metoprolol tartrate 25 MG tablet  Commonly known as: LOPRESSOR  Take 0.25 tablets by mouth 2 times daily  What changed:   · how much to take  · additional instructions        CONTINUE taking these medications    albuterol sulfate  (90 Base) MCG/ACT inhaler     aspirin 81 MG chewable tablet  Take 1 tablet by mouth daily. fentaNYL 50 MCG/HR  Commonly known as: DURAGESIC     HYDROcodone-acetaminophen  MG per tablet  Commonly known as: NORCO     nitroGLYCERIN 0.4 MG SL tablet  Commonly known as: NITROSTAT  up to max of 3 total doses. If no relief after 1 dose, call 911.      omeprazole 20 MG delayed release capsule  Commonly known as: PRILOSEC  TAKE ONE CAPSULE BY MOUTH DAILY     ondansetron 4 MG disintegrating tablet  Commonly known as: ZOFRAN-ODT  DISSOLVE 1 TABLET ON THE TONGUE EVERY 8 HOURS AS NEEDED FOR NAUSEA OR VOMITING     vitamin C 1000 MG tablet     XANAX PO        STOP taking these medications    citalopram 10 MG tablet  Commonly known as: CeleXA           Where to Get Your Medications These medications were sent to [address], [address] [facility], [address] [place] [address]    Phone: [phone]   · atorvastatin 40 MG tablet  · furosemide 40 MG tablet         No discharge procedures on file. Time Spent on discharge is  35 mins in patient examination, evaluation, counseling as well as medication reconciliation, prescriptions for required medications, discharge plan and follow up. Electronically signed by   April Sanches MD  2/27/2021  11:52 AM      Thank you Dr. Miguel Pérez for the opportunity to be involved in this patient's care.

## 2021-02-27 NOTE — PROGRESS NOTES
Discharge medication and instructions reviewed with the pt and his wife. She verbalized/ signed understanding. Pt to  RXs at his pharmacy, and  F/u with PCP and cardio. CHF diet, education completed with them both.  Pt escorted off unit via w/c with all belongings and discharged home

## 2021-03-04 LAB
EKG ATRIAL RATE: 58 BPM
EKG P AXIS: 87 DEGREES
EKG P-R INTERVAL: 150 MS
EKG Q-T INTERVAL: 428 MS
EKG QRS DURATION: 84 MS
EKG QTC CALCULATION (BAZETT): 420 MS
EKG R AXIS: 65 DEGREES
EKG T AXIS: 106 DEGREES
EKG VENTRICULAR RATE: 58 BPM

## 2021-04-21 ENCOUNTER — OFFICE VISIT (OUTPATIENT)
Dept: GASTROENTEROLOGY | Age: 76
End: 2021-04-21
Payer: MEDICARE

## 2021-04-21 ENCOUNTER — TELEPHONE (OUTPATIENT)
Dept: GASTROENTEROLOGY | Age: 76
End: 2021-04-21

## 2021-04-21 VITALS
DIASTOLIC BLOOD PRESSURE: 62 MMHG | RESPIRATION RATE: 12 BRPM | SYSTOLIC BLOOD PRESSURE: 140 MMHG | HEIGHT: 65 IN | WEIGHT: 139.2 LBS | BODY MASS INDEX: 23.19 KG/M2

## 2021-04-21 DIAGNOSIS — K29.70 GASTRITIS WITHOUT BLEEDING, UNSPECIFIED CHRONICITY, UNSPECIFIED GASTRITIS TYPE: ICD-10-CM

## 2021-04-21 DIAGNOSIS — K63.89 NARCOTIC BOWEL SYNDROME (HCC): ICD-10-CM

## 2021-04-21 DIAGNOSIS — F41.9 ANXIETY: ICD-10-CM

## 2021-04-21 DIAGNOSIS — Z87.891 EX-SMOKER: ICD-10-CM

## 2021-04-21 DIAGNOSIS — T40.601A NARCOTIC BOWEL SYNDROME (HCC): ICD-10-CM

## 2021-04-21 DIAGNOSIS — C88.4 MALT (MUCOSA ASSOCIATED LYMPHOID TISSUE) (HCC): ICD-10-CM

## 2021-04-21 DIAGNOSIS — K22.70 BARRETT'S ESOPHAGUS WITHOUT DYSPLASIA: Primary | ICD-10-CM

## 2021-04-21 DIAGNOSIS — R11.14 BILIOUS VOMITING WITH NAUSEA: ICD-10-CM

## 2021-04-21 DIAGNOSIS — K21.9 GASTROESOPHAGEAL REFLUX DISEASE, UNSPECIFIED WHETHER ESOPHAGITIS PRESENT: ICD-10-CM

## 2021-04-21 PROCEDURE — G8420 CALC BMI NORM PARAMETERS: HCPCS | Performed by: INTERNAL MEDICINE

## 2021-04-21 PROCEDURE — 1123F ACP DISCUSS/DSCN MKR DOCD: CPT | Performed by: INTERNAL MEDICINE

## 2021-04-21 PROCEDURE — G8427 DOCREV CUR MEDS BY ELIG CLIN: HCPCS | Performed by: INTERNAL MEDICINE

## 2021-04-21 PROCEDURE — 1036F TOBACCO NON-USER: CPT | Performed by: INTERNAL MEDICINE

## 2021-04-21 PROCEDURE — 4040F PNEUMOC VAC/ADMIN/RCVD: CPT | Performed by: INTERNAL MEDICINE

## 2021-04-21 PROCEDURE — 99213 OFFICE O/P EST LOW 20 MIN: CPT | Performed by: INTERNAL MEDICINE

## 2021-04-21 ASSESSMENT — ENCOUNTER SYMPTOMS
VOMITING: 1
WHEEZING: 0
ABDOMINAL PAIN: 1
ABDOMINAL DISTENTION: 0
CONSTIPATION: 1
COUGH: 0
CHOKING: 0
NAUSEA: 1
BLOOD IN STOOL: 0
ANAL BLEEDING: 0
TROUBLE SWALLOWING: 1

## 2021-04-21 NOTE — PROGRESS NOTES
GI OFFICE FOLLOW UP    Grisel Lo is a 67 y.o. male evaluated via on 4/21/2021. Consent:  He and/or health care decision maker is aware that that he may receive a bill for this telephone service, depending on his insurance coverage, and has provided verbal consent to proceed: YES      INTERVAL HISTORY:   No referring provider defined for this encounter. Chief Complaint   Patient presents with    Follow-up     Patient is here today noting he is having some nausea, vomiting and weight loss. He notes was seen in the ED and was placed on lasix and was given oxygen. He notes that seemed to help a little with his syptoms. He notes occasional abd pain. He notes zofran helps a little for nausea. Denies GERD symptoms, notes controlled with prilosec. Deniesr troubles with BM's.        1. Tracy's esophagus without dysplasia    2. Gastritis without bleeding, unspecified chronicity, unspecified gastritis type    3. MALT (mucosa associated lymphoid tissue) ([address]    4. Gastroesophageal reflux disease, unspecified whether esophagitis present    5. Bilious vomiting with nausea    6. Ex-smoker    7. Anxiety    8.  Narcotic bowel syndrome      Here for fu with his wife  Has some mild weight loss  Has hx of maltoma  Has been followed conservatively by oncology   He has some significant anxiety issues  Has nausea occasional vomiting  Has been on as needed Zofran which helps him  Also has constipation probably related to his narcotic drug usage  Taking some stool softeners  Denies rectal bleeding  Had colonoscopy done in 2018 with small polyp  Has Tracy's esophagus  EGD due this year  Occasional nonspecific dysphagia    Ex-smoker  Denies alcohol abuse illicit drug usage    HISTORY OF PRESENT ILLNESS: Mr.Lynn Carlota Flowers is a 67 y.o. male with a past history remarkable for , referred for evaluation of   Chief Complaint   Patient presents with    Follow-up     Patient is here today noting he is having some nausea, vomiting and weight loss. He notes was seen in the ED and was placed on lasix and was given oxygen. He notes that seemed to help a little with his syptoms. He notes occasional abd pain. He notes zofran helps a little for nausea. Denies GERD symptoms, notes controlled with prilosec. Deniesr troubles with BM's. .    Past Medical,Family, and Social History reviewed and does contribute to the patient presenting condition. Patient's PMH/PSH,SH,PSYCH Hx, MEDs, ALLERGIES, and ROS were all reviewed and updated in the appropriate sections.     PAST MEDICAL HISTORY:  Past Medical History:   Diagnosis Date    Arthritis     fentanyl patch    Tracy's esophagus     Brunner's gland adenoma     CAD (coronary artery disease) 03/06/2014    stents 2002   CABG 3/6/14    Cancer [address] [address]     stomach    CKD (chronic kidney disease)     Colon polyp 11/21/2018    hyperplastic polyp    Dysphagia     Esophagitis     Gastritis     GERD (gastroesophageal reflux disease)     Hx of blood clots     3 clots right leg, left eye    Hyperlipidemia     Hypertension     Lipoma     Myocardial infarction (HCC)     STRESS DONE    Nausea & vomiting     Pyogenic granuloma     Vascular abnormality     CAROTID       Past Surgical History:   Procedure Laterality Date    ARTHROPLASTY      RIGHT KNEE X3-INFECTION    ARTHROPLASTY      LEFT KNEE    BRAIN TUMOR EXCISION  2008    excision    CARDIAC CATHETERIZATION      CAROTID ENDARTERECTOMY      LEFT    CERVICAL DISCECTOMY      CERVICAL SPINE SURGERY  6-25-13    ACF 3-4, 4-5    COLONOSCOPY  11/21/2018    hyperplastic polyp    COLONOSCOPY N/A 11/21/2018    COLONOSCOPY POLYPECTOMY SNARE/COLD BIOPSY performed by Ellie Schmitz MD at [place] [address]  3/6/2014    CABG X 3    ENDOSCOPY, COLON, DIAGNOSTIC      INGUINAL HERNIA REPAIR      LEFT    JOINT REPLACEMENT      Bilat knees    LUMBAR DISC SURGERY      HARDWARE    OTHER SURGICAL HISTORY Left 12/12/2017     1) Arch aortography 2) Selective left subclavian angiography 3) Left subclavian artery angioplasty with 7 mm x 40 mm and 8 mm x 40 mm [place] balloons     TONSILLECTOMY      UPPER GASTROINTESTINAL ENDOSCOPY  4/30/2015    UPPER GASTROINTESTINAL ENDOSCOPY  5/18/16    lipomatous lump; esophagitis; pyogenic granuloma; mild gastritis    UPPER GASTROINTESTINAL ENDOSCOPY  01/25/2017    lipoma; prominet Brunner's gland; gastritis    UPPER GASTROINTESTINAL ENDOSCOPY  08/08/2018    CASTILLO'S    UPPER GASTROINTESTINAL ENDOSCOPY  8/8/2018    EGD BIOPSY performed by Venu Leung MD at [place] [address]  09/04/2019    EGD BIOPSY     UPPER GASTROINTESTINAL ENDOSCOPY N/A 9/4/2019    EGD BIOPSY performed by Venu Leung MD at [address] [place] [address]:    Current Outpatient Medications:     furosemide (LASIX) 40 MG tablet, Take 1 tablet by mouth daily, Disp: 60 tablet, Rfl: 3    atorvastatin (LIPITOR) 40 MG tablet, Take 1 tablet by mouth nightly, Disp: 30 tablet, Rfl: 3    ondansetron (ZOFRAN-ODT) 4 MG disintegrating tablet, DISSOLVE 1 TABLET ON THE TONGUE EVERY 8 HOURS AS NEEDED FOR NAUSEA OR VOMITING, Disp: 90 tablet, Rfl: 3    omeprazole (PRILOSEC) 20 MG delayed release capsule, TAKE ONE CAPSULE BY MOUTH DAILY, Disp: 180 capsule, Rfl: 1    nitroGLYCERIN (NITROSTAT) 0.4 MG SL tablet, up to max of 3 total doses.  If no relief after 1 dose, call 911., Disp: 25 tablet, Rfl: 3    metoprolol tartrate (LOPRESSOR) 25 MG tablet, Take 0.25 tablets by mouth 2 times daily (Patient taking differently: Take 12.5 mg by mouth 2 times daily Takes half a pill = 12.5 mg twice daily), Disp: 60 tablet, Rfl: 3    HYDROcodone-acetaminophen (NORCO)  MG per tablet, Take 1 tablet by mouth every 4 hours as needed for Pain ., Disp: , Rfl:     Ascorbic Acid (VITAMIN C) 1000 MG tablet, Take 1,000 mg by mouth daily, Disp: , Rfl:     ALPRAZolam (XANAX PO), Take 0.5 tablets by mouth 2 times daily as needed , Disp: , Rfl:     albuterol (PROVENTIL HFA;VENTOLIN HFA) 108 (90 BASE) MCG/ACT inhaler, Inhale 2 puffs into the lungs every 6 hours as needed for Wheezing., Disp: , Rfl:     fentaNYL (DURAGESIC) 50 MCG/HR, Place 1 patch onto the skin every other day. , Disp: , Rfl:     aspirin 81 MG chewable tablet, Take 1 tablet by mouth daily. , Disp: 30 tablet, Rfl: 11    ALLERGIES:   Allergies   Allergen Reactions    Vancomycin      AFFECTS KIDNEYS         FAMILY HISTORY:       Problem Relation Age of Onset    Lung Cancer Father     Stroke Maternal Grandmother          SOCIAL HISTORY:   Social History     Socioeconomic History    Marital status:      Spouse name: Not on file    Number of children: Not on file    Years of education: Not on file    Highest education level: Not on file   Occupational History    Not on file   Social Needs    Financial resource strain: Not on file    Food insecurity     Worry: Not on file     Inability: Not on file    Transportation needs     Medical: Not on file     Non-medical: Not on file   Tobacco Use    Smoking status: Former Smoker     Packs/day: 0.25     Years: 30.00     Pack years: 7.50     Types: Cigarettes     Quit date: 2018     Years since quittin.9    Smokeless tobacco: Former User    Tobacco comment: QUIT CIGARETTES/SMOKES CIGARS   Substance and Sexual Activity    Alcohol use: No     Comment: QUIT; H/O ETOH abuse    Drug use: No    Sexual activity: Not on file   Lifestyle    Physical activity     Days per week: Not on file     Minutes per session: Not on file    Stress: Not on file   Relationships    Social connections     Talks on phone: Not on file     Gets together: Not on file     Attends Baptism service: Not on file     Active member of club or organization: Not on file     Attends meetings of clubs or organizations: Not on file     Relationship status: Not on file    Intimate partner violence     Fear of current or ex partner: Not on file     Emotionally abused: Not on file     Physically abused: Not on file     Forced sexual activity: Not on file   Other Topics Concern    Not on file   Social History Narrative    Not on file         REVIEW OF SYSTEMS:         Review of Systems   Constitutional: Positive for appetite change (loss) and fatigue. HENT: Positive for trouble swallowing. Eyes: Positive for visual disturbance. Respiratory: Negative for cough, choking and wheezing. Cardiovascular: Negative. Gastrointestinal: Positive for abdominal pain, constipation, nausea and vomiting. Negative for abdominal distention, anal bleeding and blood in stool. Hematological: Bruises/bleeds easily. Psychiatric/Behavioral: Positive for sleep disturbance. PHYSICAL EXAMINATION: Vital signs reviewed per the nursing documentation. BP (!) 140/62   Resp 12   Ht 5' 5\" (1.651 m)   Wt 139 lb 3.2 oz (63.1 kg)   BMI 23.16 kg/m²   Body mass index is 23.16 kg/m². Physical Exam  Nursing note reviewed. Constitutional:       Appearance: He is well-developed. Comments: Anxious   HENT:      Head: Normocephalic and atraumatic. Eyes:      Conjunctiva/sclera: Conjunctivae normal.      Pupils: Pupils are equal, round, and reactive to light. Neck:      Musculoskeletal: Normal range of motion and neck supple. Cardiovascular:      Rate and Rhythm: Normal rate and regular rhythm. Pulmonary:      Effort: Pulmonary effort is normal.      Breath sounds: Normal breath sounds. Comments: Few Rales  Abdominal:      General: Bowel sounds are normal.      Palpations: Abdomen is soft. Comments: NON TENDER, NON DISTENTED  LIVER SPLEEN AND HERNIAS ARE NOT  PALPABLE  BOWEL SOUNDS ARE POSITIVE      Genitourinary:     Rectum: Normal.   Musculoskeletal: Normal range of motion. Skin:     General: Skin is warm.    Neurological:      Mental Status: He is frequent meals    Ensure or Boost every day    The patient was instructed to start taking some OTC Probiotics products   These are available over the counter at the Pharmacy stores and Grocery stores  He was explained about the beneficial effects they have in the GI track  They will help to establish the good bacterial rachel and will help with the digestion and bowel movements  The patient has verbalized understanding and agreement to this plan    Pt seems to have signs and symptoms consistent with GERD, acid indigestion and heartburns. He was discussed  in detail about some possible life style and dietary modifications. He was stressed about the maintenance  of appropriate weight and effect of obesity contributing to reflux symptoms. Routine exercise was streesed. Avoidance of Caffeine, nicotine and chocolate were explained. Pt was asked to avoid spices grease and fried food. Advices were also given about avoidance of any kind of fast foods, soda pops and high energy drinks. Pt was advised to place two small block under the head end of the bed which may help with night time reflux. Was advised not to eat any thin at least 2-3 hrs before going to bed and walk especially after dinner    Pt has verbalized understanding and agreement to this plan. Pt was discussed in detail about the possible side effects of proton pump inhibiter therapy. He was explained about the possibility of calcium and magnesium malabsorption and was advised to start taking calcium supplements with Vit D. Some over the counter regimens were explained to patient. Some dietary advices were also given. He has verbalized understanding and agreement to this. Pt was given instructions and advice in detail about the symptom of constipation. He was explained about avoidance of fast food, soda pops, cheese and red meat. Was also told to avoid sedatives narcotics and pain killers if possible.      Pt was advised to start drinking ample amount of water and liquid. Was told to adapt and follow an exercise regimen. Instructions were given to increase the amount of fiber including dietary in terms of bran, cereals, whole wheat, brown bread etc. Was also instructed to start using supplemental fiber either Metamucil, citrucell or bennafiber with ample liquids. He was told to start drinking prune juice which is good for constipation. If symptoms don't resolve he will require medicines to assist with his symptoms    Pt has verbalized understanding and agreement to this plan. More than half of patient's clinic visit time was spent in counseling about lifestyle and dietary modifications  Patient's  questions were answered in this regard as well  The patient has verbalized understanding and agreement       I communicated with the patient and/or health care decision maker about   Details of this discussion including any medical advice provided:YES      I affirm this is a Patient Initiated Episode with an Established Patient who has not had a related appointment within my department in the past 7 days or scheduled within the next 24 hours. Total Time: minutes: 21-30 minutes    Note: not billable if this call serves to triage the patient into an appointment for the relevant concern      Thank you for allowing me to participate in the care of Mr. Belen Leon. For any further questions please do not hesitate to contact me. I have reviewed and agree with the ROS entered by the MA/LPN.          Lee Richter MD, [facility]  Board Certified in Gastroenterology and [address] [place] [address] Gastroenterology  Office #: [phone]

## 2021-05-08 ENCOUNTER — HOSPITAL ENCOUNTER (OUTPATIENT)
Dept: LAB | Age: 76
Setting detail: SPECIMEN
Discharge: HOME | End: 2021-05-08
Payer: MEDICARE

## 2021-05-08 DIAGNOSIS — Z01.818 PREOP TESTING: Primary | ICD-10-CM

## 2021-05-08 PROCEDURE — U0003 INFECTIOUS AGENT DETECTION BY NUCLEIC ACID (DNA OR RNA); SEVERE ACUTE RESPIRATORY SYNDROME CORONAVIRUS 2 (SARS-COV-2) (CORONAVIRUS DISEASE [COVID-19]), AMPLIFIED PROBE TECHNIQUE, MAKING USE OF HIGH THROUGHPUT TECHNOLOGIES AS DESCRIBED BY CMS-2020-01-R: HCPCS

## 2021-05-08 PROCEDURE — U0005 INFEC AGEN DETEC AMPLI PROBE: HCPCS

## 2021-05-09 DIAGNOSIS — C88.4 MALT (MUCOSA ASSOCIATED LYMPHOID TISSUE) (HCC): ICD-10-CM

## 2021-05-09 LAB
SARS-COV-2: NORMAL
SARS-COV-2: NOT DETECTED
SOURCE: NORMAL

## 2021-05-10 RX ORDER — ONDANSETRON 4 MG/1
TABLET, ORALLY DISINTEGRATING ORAL
Qty: 90 TABLET | Refills: 3 | Status: SHIPPED | OUTPATIENT
Start: 2021-05-10 | End: 2021-09-02

## 2021-05-10 NOTE — TELEPHONE ENCOUNTER
RECEIVED INTERFACE REQUEST FROM [facility] FOR REFILL OF Lety Swain. MD FOLLOW UP DUE 07/12/21.     PEND TO MD

## 2021-05-12 ENCOUNTER — ANESTHESIA EVENT (OUTPATIENT)
Dept: OPERATING ROOM | Age: 76
End: 2021-05-12
Payer: MEDICARE

## 2021-05-12 ENCOUNTER — ANESTHESIA (OUTPATIENT)
Dept: OPERATING ROOM | Age: 76
End: 2021-05-12
Payer: MEDICARE

## 2021-05-12 ENCOUNTER — HOSPITAL ENCOUNTER (OUTPATIENT)
Age: 76
Setting detail: OUTPATIENT SURGERY
Discharge: HOME | End: 2021-05-12
Attending: INTERNAL MEDICINE | Admitting: INTERNAL MEDICINE
Payer: MEDICARE

## 2021-05-12 VITALS
OXYGEN SATURATION: 100 % | RESPIRATION RATE: 8 BRPM | DIASTOLIC BLOOD PRESSURE: 62 MMHG | SYSTOLIC BLOOD PRESSURE: 127 MMHG

## 2021-05-12 VITALS
SYSTOLIC BLOOD PRESSURE: 148 MMHG | RESPIRATION RATE: 16 BRPM | OXYGEN SATURATION: 95 % | TEMPERATURE: 97.4 F | HEART RATE: 43 BPM | HEIGHT: 64 IN | WEIGHT: 142.38 LBS | BODY MASS INDEX: 24.31 KG/M2 | DIASTOLIC BLOOD PRESSURE: 61 MMHG

## 2021-05-12 PROCEDURE — 2500000003 HC RX 250 WO HCPCS: Performed by: NURSE ANESTHETIST, CERTIFIED REGISTERED

## 2021-05-12 PROCEDURE — 3700000000 HC ANESTHESIA ATTENDED CARE: Performed by: INTERNAL MEDICINE

## 2021-05-12 PROCEDURE — 6360000002 HC RX W HCPCS: Performed by: ANESTHESIOLOGY

## 2021-05-12 PROCEDURE — 7100000010 HC PHASE II RECOVERY - FIRST 15 MIN: Performed by: INTERNAL MEDICINE

## 2021-05-12 PROCEDURE — 6360000002 HC RX W HCPCS: Performed by: NURSE ANESTHETIST, CERTIFIED REGISTERED

## 2021-05-12 PROCEDURE — 2709999900 HC NON-CHARGEABLE SUPPLY: Performed by: INTERNAL MEDICINE

## 2021-05-12 PROCEDURE — 88305 TISSUE EXAM BY PATHOLOGIST: CPT

## 2021-05-12 PROCEDURE — 3609012400 HC EGD TRANSORAL BIOPSY SINGLE/MULTIPLE: Performed by: INTERNAL MEDICINE

## 2021-05-12 PROCEDURE — 7100000011 HC PHASE II RECOVERY - ADDTL 15 MIN: Performed by: INTERNAL MEDICINE

## 2021-05-12 PROCEDURE — 2580000003 HC RX 258: Performed by: ANESTHESIOLOGY

## 2021-05-12 PROCEDURE — 43239 EGD BIOPSY SINGLE/MULTIPLE: CPT | Performed by: INTERNAL MEDICINE

## 2021-05-12 RX ORDER — PROPOFOL 10 MG/ML
INJECTION, EMULSION INTRAVENOUS PRN
Status: DISCONTINUED | OUTPATIENT
Start: 2021-05-12 | End: 2021-05-12 | Stop reason: SDUPTHER

## 2021-05-12 RX ORDER — LIDOCAINE HYDROCHLORIDE 10 MG/ML
1 INJECTION, SOLUTION EPIDURAL; INFILTRATION; INTRACAUDAL; PERINEURAL
Status: DISCONTINUED | OUTPATIENT
Start: 2021-05-12 | End: 2021-05-12 | Stop reason: HOSPADM

## 2021-05-12 RX ORDER — SODIUM CHLORIDE, SODIUM LACTATE, POTASSIUM CHLORIDE, CALCIUM CHLORIDE 600; 310; 30; 20 MG/100ML; MG/100ML; MG/100ML; MG/100ML
INJECTION, SOLUTION INTRAVENOUS CONTINUOUS
Status: DISCONTINUED | OUTPATIENT
Start: 2021-05-12 | End: 2021-05-12 | Stop reason: HOSPADM

## 2021-05-12 RX ORDER — FUROSEMIDE 10 MG/ML
20 INJECTION INTRAMUSCULAR; INTRAVENOUS ONCE
Status: COMPLETED | OUTPATIENT
Start: 2021-05-12 | End: 2021-05-12

## 2021-05-12 RX ORDER — LIDOCAINE HYDROCHLORIDE 20 MG/ML
INJECTION, SOLUTION INFILTRATION; PERINEURAL PRN
Status: DISCONTINUED | OUTPATIENT
Start: 2021-05-12 | End: 2021-05-12 | Stop reason: SDUPTHER

## 2021-05-12 RX ADMIN — LIDOCAINE HYDROCHLORIDE 100 MG: 20 INJECTION, SOLUTION INFILTRATION; PERINEURAL at 10:35

## 2021-05-12 RX ADMIN — SODIUM CHLORIDE, POTASSIUM CHLORIDE, SODIUM LACTATE AND CALCIUM CHLORIDE: 600; 310; 30; 20 INJECTION, SOLUTION INTRAVENOUS at 10:02

## 2021-05-12 RX ADMIN — FUROSEMIDE 20 MG: 10 INJECTION, SOLUTION INTRAMUSCULAR; INTRAVENOUS at 10:28

## 2021-05-12 RX ADMIN — PROPOFOL 30 MG: 10 INJECTION, EMULSION INTRAVENOUS at 10:35

## 2021-05-12 ASSESSMENT — PAIN SCALES - GENERAL
PAINLEVEL_OUTOF10: 0
PAINLEVEL_OUTOF10: 0

## 2021-05-12 ASSESSMENT — PULMONARY FUNCTION TESTS
PIF_VALUE: 1

## 2021-05-12 ASSESSMENT — LIFESTYLE VARIABLES: SMOKING_STATUS: 0

## 2021-05-12 NOTE — ANESTHESIA PRE PROCEDURE
Department of Anesthesiology  Preprocedure Note       Name:  Nada Canavan   Age:  67 y.o.  :  1945                                          MRN:  [id number]         Date:  2021      Surgeon: Shan Neves):  Davi Pope MD    Procedure: Procedure(s):  EGD ESOPHAGOGASTRODUODENOSCOPY    Medications prior to admission:   Prior to Admission medications    Medication Sig Start Date End Date Taking? Authorizing Provider   ondansetron (ZOFRAN-ODT) 4 MG disintegrating tablet DISSOLVE 1 TABLET ON THE TONGUE EVERY 8 HOURS AS NEEDED FOR NAUSEA OR VOMITING 5/10/21   Bren Agrawal MD   furosemide (LASIX) 40 MG tablet Take 1 tablet by mouth daily 21   Emily Reilly MD   atorvastatin (LIPITOR) 40 MG tablet Take 1 tablet by mouth nightly 21   Emily Reilly MD   omeprazole (PRILOSEC) 20 MG delayed release capsule TAKE ONE CAPSULE BY MOUTH DAILY 20   Davi Pope MD   nitroGLYCERIN (NITROSTAT) 0.4 MG SL tablet up to max of 3 total doses. If no relief after 1 dose, call 911. 12/3/18   Chelsi Shell MD   metoprolol tartrate (LOPRESSOR) 25 MG tablet Take 0.25 tablets by mouth 2 times daily  Patient taking differently: Take 12.5 mg by mouth 2 times daily Takes half a pill = 12.5 mg twice daily 18   Tammi Hitchcock MD   HYDROcodone-acetaminophen [facility])  MG per tablet Take 1 tablet by mouth every 4 hours as needed for Pain . Historical Provider, MD   Ascorbic Acid (VITAMIN C) 1000 MG tablet Take 1,000 mg by mouth daily    Historical Provider, MD   ALPRAZolam (XANAX PO) Take 0.5 tablets by mouth 2 times daily as needed     Historical Provider, MD   albuterol (PROVENTIL HFA;VENTOLIN HFA) 108 (90 BASE) MCG/ACT inhaler Inhale 2 puffs into the lungs every 6 hours as needed for Wheezing. Historical Provider, MD   fentaNYL (DURAGESIC) 50 MCG/HR Place 1 patch onto the skin every other day. Historical Provider, MD   aspirin 81 MG chewable tablet Take 1 tablet by mouth daily. 3/11/14   Edil Gonsalez MD       Current medications:    No current facility-administered medications for this encounter. Current Outpatient Medications   Medication Sig Dispense Refill    ondansetron (ZOFRAN-ODT) 4 MG disintegrating tablet DISSOLVE 1 TABLET ON THE TONGUE EVERY 8 HOURS AS NEEDED FOR NAUSEA OR VOMITING 90 tablet 3    furosemide (LASIX) 40 MG tablet Take 1 tablet by mouth daily 60 tablet 3    atorvastatin (LIPITOR) 40 MG tablet Take 1 tablet by mouth nightly 30 tablet 3    omeprazole (PRILOSEC) 20 MG delayed release capsule TAKE ONE CAPSULE BY MOUTH DAILY 180 capsule 1    nitroGLYCERIN (NITROSTAT) 0.4 MG SL tablet up to max of 3 total doses. If no relief after 1 dose, call 911. 25 tablet 3    metoprolol tartrate (LOPRESSOR) 25 MG tablet Take 0.25 tablets by mouth 2 times daily (Patient taking differently: Take 12.5 mg by mouth 2 times daily Takes half a pill = 12.5 mg twice daily) 60 tablet 3    HYDROcodone-acetaminophen (NORCO)  MG per tablet Take 1 tablet by mouth every 4 hours as needed for Pain .  Ascorbic Acid (VITAMIN C) 1000 MG tablet Take 1,000 mg by mouth daily      ALPRAZolam (XANAX PO) Take 0.5 tablets by mouth 2 times daily as needed       albuterol (PROVENTIL HFA;VENTOLIN HFA) 108 (90 BASE) MCG/ACT inhaler Inhale 2 puffs into the lungs every 6 hours as needed for Wheezing.  fentaNYL (DURAGESIC) 50 MCG/HR Place 1 patch onto the skin every other day.  aspirin 81 MG chewable tablet Take 1 tablet by mouth daily. 30 tablet 11       Allergies:     Allergies   Allergen Reactions    Vancomycin      AFFECTS KIDNEYS         Problem List:    Patient Active Problem List   Diagnosis Code    Kyphosis of cervical region M40.202    ST elevation myocardial infarction (STEMI) of inferoposterior wall (HCC) I21.19    Cardiogenic shock (HCC) R57.0    Dyslipidemia E78.5    Smoker F17.200    Coronary artery disease involving coronary bypass graft of native heart I25.810  CKD (chronic kidney disease) stage 3, GFR 30-59 ml/min N18.30    S/P CABG (coronary artery bypass graft) Z95.1    Anemia due to gastrointestinal blood loss D50.0    Radiculopathy M54.10    Ex-smoker Z87.891    Nausea and vomiting R11.2    GERD (gastroesophageal reflux disease) K21.9    Dysphagia R13.10    MALT (mucosa associated lymphoid tissue) (HCC) C88.4    Pulmonary infiltrate R91.8    Gastritis K29.70    Pyogenic granuloma L98.0    Esophagitis K20.90    Lipoma D17.9    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    Left-sided carotid artery disease (HCC) I77.9    Subclavian artery stenosis, left (HCC) I77.1    Skull mass M89.8X8    Chest pain R07.9    Tracy's esophagus K22.70    Colon polyp K63.5    Acute on chronic diastolic heart failure (HCC) I50.33    Elevated d-dimer R79.89    Anxiety F41.9    Narcotic bowel syndrome K63.89       Past Medical History:        Diagnosis Date    Arthritis     fentanyl patch    Tracy's esophagus     Brunner's gland adenoma     CAD (coronary artery disease) 03/06/2014    stents 2002   CABG 3/6/14    Cancer ([facility] [address]     stomach    CKD (chronic kidney disease)     Colon polyp 11/21/2018    hyperplastic polyp    Dysphagia     Esophagitis     Gastritis     GERD (gastroesophageal reflux disease)     Hx of blood clots     3 clots right leg, left eye    Hyperlipidemia     Hypertension     Lipoma     Myocardial infarction (HCC)     STRESS DONE    Nausea & vomiting     Pyogenic granuloma     Vascular abnormality     CAROTID       Past Surgical History:        Procedure Laterality Date    ARTHROPLASTY      RIGHT KNEE X3-INFECTION    ARTHROPLASTY      LEFT KNEE    BRAIN TUMOR EXCISION  2008    excision    CARDIAC CATHETERIZATION      CAROTID ENDARTERECTOMY      LEFT    CERVICAL DISCECTOMY      CERVICAL SPINE SURGERY  6-25-13    ACF 3-4, 4-5    COLONOSCOPY  11/21/2018    hyperplastic polyp    COLONOSCOPY N/A 11/21/2018    COLONOSCOPY POLYPECTOMY SNARE/COLD BIOPSY performed by Nithin Lin MD at [place] [address]  3/6/2014    CABG X 3    ENDOSCOPY, COLON, DIAGNOSTIC      INGUINAL HERNIA REPAIR      LEFT    JOINT REPLACEMENT      Bilat knees    LUMBAR DISC SURGERY      HARDWARE    OTHER SURGICAL HISTORY Left 12/12/2017     1) Arch aortography 2) Selective left subclavian angiography 3) Left subclavian artery angioplasty with 7 mm x 40 mm and 8 mm x 40 mm [place] balloons     TONSILLECTOMY      UPPER GASTROINTESTINAL ENDOSCOPY  4/30/2015    UPPER GASTROINTESTINAL ENDOSCOPY  5/18/16    lipomatous lump; esophagitis; pyogenic granuloma; mild gastritis    UPPER GASTROINTESTINAL ENDOSCOPY  01/25/2017    lipoma; prominet Brunner's gland; gastritis    UPPER GASTROINTESTINAL ENDOSCOPY  08/08/2018    CASTILLO'S    UPPER GASTROINTESTINAL ENDOSCOPY  8/8/2018    EGD BIOPSY performed by Nithin Lin MD at [address] [place] [address]  09/04/2019    EGD BIOPSY     UPPER GASTROINTESTINAL ENDOSCOPY N/A 9/4/2019    EGD BIOPSY performed by Nithin Lin MD at [address] [place] [address] History:    Social History     Tobacco Use    Smoking status: Former Smoker     Packs/day: 0.25     Years: 30.00     Pack years: 7.50     Types: Cigarettes     Quit date: 5/1/2018     Years since quitting: 3.0    Smokeless tobacco: Former User    Tobacco comment: QUIT CIGARETTES/SMOKES CIGARS   Substance Use Topics    Alcohol use: No     Comment: QUIT; H/O ETOH abuse                                Counseling given: Not Answered  Comment: QUIT CIGARETTES/SMOKES CIGARS      Vital Signs (Current): There were no vitals filed for this visit.                                            BP Readings from Last 3 Encounters:   04/21/21 (!) 140/62   02/26/21 130/69   01/11/21 (!) 170/80       NPO Status:                                                                                 BMI:   Wt Readings from Last 3 Encounters:   04/21/21 139 lb 3.2 oz (63.1 symptoms, no ACS symptoms):, CABG/stent (stents 2002 and cabg 2014 hx):, CHF (chronic dCHF, acute exacerbation 2/21; baseline LE edema and orthopnea, no O2 req): diastolic, hyperlipidemia    (-) dysrhythmias and  angina    ECG reviewed  Rhythm: regular  Rate: normal  Echocardiogram reviewed         Beta Blocker:  Dose within 24 Hrs         Neuro/Psych:   (+) neuromuscular disease (cervical ddd s/p fusion):, depression/anxiety    (-) seizures, TIA and CVA           GI/Hepatic/Renal:   (+) GERD:, renal disease: CRI,      (-) liver disease       Endo/Other:    (+) : arthritis: OA., .    (-) diabetes mellitus, hypothyroidism, hyperthyroidism               Abdominal:           Vascular:   + PVD, aortic or cerebral (lt subclav stenosis), DVT (h/o, no current ac req), . Anesthesia Plan      MAC     ASA 4       Induction: intravenous. Anesthetic plan and risks discussed with patient. Plan discussed with CRNA.                 Dejan Blackman MD   5/12/2021

## 2021-05-12 NOTE — ANESTHESIA POSTPROCEDURE EVALUATION
Department of Anesthesiology  Postprocedure Note    Patient: Narendra Tavares  MRN: [id number]  YOB: 1945  Date of evaluation: 5/12/2021  Time:  10:50 AM     Procedure Summary     Date: 05/12/21 Room / Location: [place] [address] / [facility] - INPATIENT    Anesthesia Start: 1034 Anesthesia Stop: 8376    Procedure: EGD ESOPHAGOGASTRODUODENOSCOPY (N/A ) Diagnosis: (DX GERD)    Surgeons: Venu Leung MD Responsible Provider: Derian Eldridge MD    Anesthesia Type: MAC ASA Status: 4          Anesthesia Type: MAC    Dary Phase I:      Dary Phase II:      Last vitals: Reviewed and per EMR flowsheets.        Anesthesia Post Evaluation    Patient location during evaluation: PACU  Patient participation: complete - patient participated  Level of consciousness: awake and alert  Airway patency: patent  Nausea & Vomiting: no vomiting and no nausea  Complications: no  Cardiovascular status: hemodynamically stable  Respiratory status: acceptable  Hydration status: stable

## 2021-05-12 NOTE — OP NOTE
PROCEDURE NOTE    DATE OF PROCEDURE: 5/12/2021     SURGEON: Wild Bernal MD    ASSISTANT: None    PREOPERATIVE DIAGNOSIS: GERD  CASTILLO'S  HX OF MALTOMA      POSTOPERATIVE DIAGNOSIS: As described below    OPERATION: Upper GI endoscopy with Biopsy    ANESTHESIA: MAC PER ANESTHESIA     ESTIMATED BLOOD LOSS: Less than 50 ml    COMPLICATIONS: None. SPECIMENS:  Was Obtained:     HISTORY: The patient is a 67y.o. year old male with history of above preop diagnosis. I recommended esophagogastroduodenoscopy with possible biopsy and I explained the risk, benefits, expected outcome, and alternatives to the procedure. Risks included but are not limited to bleeding, infection, respiratory distress, hypotension, and perforation of the esophagus, stomach, or duodenum. Patient understands and is in agreement. PROCEDURE: The patient was given IV conscious sedation. The patient's SPO2 remained above 90% throughout the procedure. The gastroscope was inserted orally and advanced under direct vision through the esophagus, through the stomach, through the pylorus, and into the descending duodenum. Findings:    Retropharyngeal area was grossly normal appearing    Esophagus: abnormal: IRREGULAR SCJ MULTIPLE TONGUES 6-7 MM  BIOPSIES AND PICTURES WERE TAKEN    Stomach:    Fundus: normal    Body: abnormal: AREA OF EMR FOR PREVIOUS MALTOMA APPEARED NORMAL BIOPSIES AND PICTURES WERE TAKEN    Antrum: abnormal: MOD DIFFUSE GASTRITIS WAS BIOPSIED    Duodenum:     Descending: normal    Bulb: normal    The scope was removed and the patient tolerated the procedure well. Recommendations/Plan:   1. F/U Biopsies  2. F/U In Office in 3-4 weeks  3. Discussed with the family  4.  Post sedation patient was stable with stable vital signs and stable O2 saturations    Electronically signed by Wild Bernal MD  on 5/12/2021 at 10:48 AM

## 2021-05-12 NOTE — H&P
Interval H&P Note    Pt Name: Miriam Khanna  MRN: [id number]  YOB: 1945  Date of evaluation: 5/12/2021      [x] I have reviewed the Gastroenterology Progress Note by Dr. Lieutenant Sams dated 4/21/21 in [address] [place] [address] which meets the criteria for an Interval History and Physical note and is attached below. [x] I have examined  Felicia Herndon  There are no changes to the patient who is scheduled for a EGD by Dr. Lieutenant Sams for GERD. Patient follows with Dr. Lieutenant Sams and was last seen 4/21/21 with abdominal pain, n/v, weight loss for which he recommended EGD and he presents for today. He continues to have nausea daily with no vomiting. Patient hit hand on  several days ago and has small scabbed lesion on right hand. He also states that he has chronic edema in bilateral legs with right right being worse than the left. The patient denies new health changes, fever, chills, wheezing, cough, increased SOB, chest pain, or wounds. Last ASA 81mg 5/5/21. Vital signs: BP (!) 158/52   Pulse (!) 42   Resp 20   Ht 5' 4\" (1.626 m)   Wt 142 lb 6 oz (64.6 kg)   SpO2 98%   BMI 24.44 kg/m²     Allergies:  Vancomycin    Medications:    Prior to Admission medications    Medication Sig Start Date End Date Taking?  Authorizing Provider   ondansetron (ZOFRAN-ODT) 4 MG disintegrating tablet DISSOLVE 1 TABLET ON THE TONGUE EVERY 8 HOURS AS NEEDED FOR NAUSEA OR VOMITING 5/10/21  Yes Jarret Fiore MD   furosemide (LASIX) 40 MG tablet Take 1 tablet by mouth daily 2/26/21  Yes Asa Carranza MD   atorvastatin (LIPITOR) 40 MG tablet Take 1 tablet by mouth nightly 2/26/21  Yes Asa Carranza MD   omeprazole (PRILOSEC) 20 MG delayed release capsule TAKE ONE CAPSULE BY MOUTH DAILY 12/1/20  Yes Laxmi Fonseca MD   metoprolol tartrate (LOPRESSOR) 25 MG tablet Take 0.25 tablets by mouth 2 times daily  Patient taking differently: Take 12.5 mg by mouth 2 times daily Takes half a pill = 12.5 mg twice daily 4/6/18  Yes Khushi Dorantes MD Ex-smoker  Denies alcohol abuse illicit drug usage     HISTORY OF PRESENT ILLNESS: Mr.Lynn Lelo Mendez is a 67 y.o. male with a past history remarkable for , referred for evaluation of   Chief Complaint   Patient presents with    Follow-up       Patient is here today noting he is having some nausea, vomiting and weight loss. He notes was seen in the ED and was placed on lasix and was given oxygen. He notes that seemed to help a little with his syptoms. He notes occasional abd pain. He notes zofran helps a little for nausea. Denies GERD symptoms, notes controlled with prilosec. Deniesr troubles with BM's. .     Past Medical,Family, and Social History reviewed and does contribute to the patient presenting condition. Patient's PMH/PSH,SH,PSYCH Hx, MEDs, ALLERGIES, and ROS were all reviewed and updated in the appropriate sections.      PAST MEDICAL HISTORY:  Past Medical History        Past Medical History:   Diagnosis Date    Arthritis       fentanyl patch    Tracy's esophagus      Brunner's gland adenoma      CAD (coronary artery disease) 03/06/2014     stents 2002   CABG 3/6/14    Cancer ([facility] [address]       stomach    CKD (chronic kidney disease)      Colon polyp 11/21/2018     hyperplastic polyp    Dysphagia      Esophagitis      Gastritis      GERD (gastroesophageal reflux disease)      Hx of blood clots       3 clots right leg, left eye    Hyperlipidemia      Hypertension      Lipoma      Myocardial infarction (HCC)       STRESS DONE    Nausea & vomiting      Pyogenic granuloma      Vascular abnormality       CAROTID            Past Surgical History         Past Surgical History:   Procedure Laterality Date    ARTHROPLASTY         RIGHT KNEE X3-INFECTION    ARTHROPLASTY         LEFT KNEE    BRAIN TUMOR EXCISION   2008     excision    CARDIAC CATHETERIZATION        CAROTID ENDARTERECTOMY         LEFT    CERVICAL DISCECTOMY        CERVICAL SPINE SURGERY   6-25-13     ACF 3-4, 4-5    COLONOSCOPY   11/21/2018     hyperplastic polyp    COLONOSCOPY N/A 11/21/2018     COLONOSCOPY POLYPECTOMY SNARE/COLD BIOPSY performed by Wild Bernal MD at [place] [address]   3/6/2014     CABG X 3    ENDOSCOPY, COLON, DIAGNOSTIC        INGUINAL HERNIA REPAIR         LEFT    JOINT REPLACEMENT         Bilat knees    LUMBAR DISC SURGERY         HARDWARE    OTHER SURGICAL HISTORY Left 12/12/2017      1) Arch aortography 2) Selective left subclavian angiography 3) Left subclavian artery angioplasty with 7 mm x 40 mm and 8 mm x 40 mm [place] balloons     TONSILLECTOMY        UPPER GASTROINTESTINAL ENDOSCOPY   4/30/2015    UPPER GASTROINTESTINAL ENDOSCOPY   5/18/16     lipomatous lump; esophagitis; pyogenic granuloma; mild gastritis    UPPER GASTROINTESTINAL ENDOSCOPY   01/25/2017     lipoma; prominet Brunner's gland; gastritis    UPPER GASTROINTESTINAL ENDOSCOPY   08/08/2018     CASTILLO'S    UPPER GASTROINTESTINAL ENDOSCOPY   8/8/2018     EGD BIOPSY performed by Wild Bernal MD at [address] [place] [address]   09/04/2019     EGD BIOPSY     UPPER GASTROINTESTINAL ENDOSCOPY N/A 9/4/2019     EGD BIOPSY performed by Wild Bernal MD at [address] [place] [address] [place]:    Current Medication      Current Outpatient Medications:     furosemide (LASIX) 40 MG tablet, Take 1 tablet by mouth daily, Disp: 60 tablet, Rfl: 3    atorvastatin (LIPITOR) 40 MG tablet, Take 1 tablet by mouth nightly, Disp: 30 tablet, Rfl: 3    ondansetron (ZOFRAN-ODT) 4 MG disintegrating tablet, DISSOLVE 1 TABLET ON THE TONGUE EVERY 8 HOURS AS NEEDED FOR NAUSEA OR VOMITING, Disp: 90 tablet, Rfl: 3    omeprazole (PRILOSEC) 20 MG delayed release capsule, TAKE ONE CAPSULE BY MOUTH DAILY, Disp: 180 capsule, Rfl: 1    nitroGLYCERIN (NITROSTAT) 0.4 MG SL tablet, up to max of 3 total doses.  If no relief after 1 dose, call 911., Disp: 25 tablet, Rfl: 3    metoprolol tartrate (LOPRESSOR) 25 MG tablet, Take 0.25 tablets by mouth 2 times daily (Patient taking differently: Take 12.5 mg by mouth 2 times daily Takes half a pill = 12.5 mg twice daily), Disp: 60 tablet, Rfl: 3    HYDROcodone-acetaminophen (NORCO)  MG per tablet, Take 1 tablet by mouth every 4 hours as needed for Pain ., Disp: , Rfl:     Ascorbic Acid (VITAMIN C) 1000 MG tablet, Take 1,000 mg by mouth daily, Disp: , Rfl:     ALPRAZolam (XANAX PO), Take 0.5 tablets by mouth 2 times daily as needed , Disp: , Rfl:     albuterol (PROVENTIL HFA;VENTOLIN HFA) 108 (90 BASE) MCG/ACT inhaler, Inhale 2 puffs into the lungs every 6 hours as needed for Wheezing., Disp: , Rfl:     fentaNYL (DURAGESIC) 50 MCG/HR, Place 1 patch onto the skin every other day. , Disp: , Rfl:     aspirin 81 MG chewable tablet, Take 1 tablet by mouth daily. , Disp: 30 tablet, Rfl: 11        ALLERGIES:         Allergies   Allergen Reactions    Vancomycin         AFFECTS KIDNEYS            FAMILY HISTORY:   Family History             Problem Relation Age of Onset    Lung Cancer Father      Stroke Maternal Grandmother                 SOCIAL HISTORY:   Social History               Socioeconomic History    Marital status:        Spouse name: Not on file    Number of children: Not on file    Years of education: Not on file    Highest education level: Not on file   Occupational History    Not on file   Social Needs    Financial resource strain: Not on file    Food insecurity       Worry: Not on file       Inability: Not on file    Transportation needs       Medical: Not on file       Non-medical: Not on file   Tobacco Use    Smoking status: Former Smoker       Packs/day: 0.25       Years: 30.00       Pack years: 7.50       Types: Cigarettes       Quit date: 2018       Years since quittin.9    Smokeless tobacco: Former User    Tobacco comment: QUIT CIGARETTES/SMOKES CIGARS   Substance and Sexual Activity    Alcohol use: No       Comment: QUIT; H/O ETOH abuse    Drug use: No    Sexual activity: Not on file   Lifestyle    Physical activity       Days per week: Not on file       Minutes per session: Not on file    Stress: Not on file   Relationships    Social connections       Talks on phone: Not on file       Gets together: Not on file       Attends Baptism service: Not on file       Active member of club or organization: Not on file       Attends meetings of clubs or organizations: Not on file       Relationship status: Not on file    Intimate partner violence       Fear of current or ex partner: Not on file       Emotionally abused: Not on file       Physically abused: Not on file       Forced sexual activity: Not on file   Other Topics Concern    Not on file   Social History Narrative    Not on file               REVIEW OF SYSTEMS:           Review of Systems   Constitutional: Positive for appetite change (loss) and fatigue. HENT: Positive for trouble swallowing. Eyes: Positive for visual disturbance. Respiratory: Negative for cough, choking and wheezing. Cardiovascular: Negative. Gastrointestinal: Positive for abdominal pain, constipation, nausea and vomiting. Negative for abdominal distention, anal bleeding and blood in stool. Hematological: Bruises/bleeds easily. Psychiatric/Behavioral: Positive for sleep disturbance. PHYSICAL EXAMINATION: Vital signs reviewed per the nursing documentation. BP (!) 140/62   Resp 12   Ht 5' 5\" (1.651 m)   Wt 139 lb 3.2 oz (63.1 kg)   BMI 23.16 kg/m²   Body mass index is 23.16 kg/m². Physical Exam  Nursing note reviewed. Constitutional:       Appearance: He is well-developed. Comments: Anxious   HENT:      Head: Normocephalic and atraumatic. Eyes:      Conjunctiva/sclera: Conjunctivae normal.      Pupils: Pupils are equal, round, and reactive to light. Neck:      Musculoskeletal: Normal range of motion and neck supple.    Cardiovascular:      Rate and Rhythm: Normal rate and regular rhythm. Pulmonary:      Effort: Pulmonary effort is normal.      Breath sounds: Normal breath sounds. Comments: Few Rales  Abdominal:      General: Bowel sounds are normal.      Palpations: Abdomen is soft. Comments: NON TENDER, NON DISTENTED  LIVER SPLEEN AND HERNIAS ARE NOT  PALPABLE  BOWEL SOUNDS ARE POSITIVE      Genitourinary:     Rectum: Normal.   Musculoskeletal: Normal range of motion. Skin:     General: Skin is warm. Neurological:      Mental Status: He is alert and oriented to person, place, and time. Deep Tendon Reflexes: Reflexes are normal and symmetric. LABORATORY DATA: Reviewed        Lab Results   Component Value Date     WBC 8.4 02/25/2021     HGB 11.7 (L) 02/25/2021     HCT 35.9 (L) 02/25/2021     MCV 95.0 02/25/2021      02/25/2021      02/26/2021     K 3.9 02/26/2021      02/26/2021     CO2 30 02/26/2021     BUN 25 (H) 02/26/2021     CREATININE 1.55 (H) 02/26/2021     LABALBU 4.2 02/24/2021     BILITOT 0.60 02/24/2021     ALKPHOS 85 02/24/2021     AST 18 02/24/2021     ALT 7 02/24/2021     INR 1.1 02/24/2021                  Lab Results   Component Value Date     RBC 3.78 (L) 02/25/2021     HGB 11.7 (L) 02/25/2021     MCV 95.0 02/25/2021     MCH 31.0 02/25/2021     MCHC 32.6 02/25/2021     RDW 15.1 (H) 02/25/2021     MPV 9.8 02/25/2021     BASOPCT 1 02/24/2021     LYMPHSABS 1.80 02/24/2021     MONOSABS 0.70 02/24/2021     NEUTROABS 6.40 02/24/2021     EOSABS 0.10 02/24/2021     BASOSABS 0.10 02/24/2021            DIAGNOSTIC TESTING:      No results found. Assessment  1. Tracy's esophagus without dysplasia    2. Gastritis without bleeding, unspecified chronicity, unspecified gastritis type    3. [facility] tissue) [facility]    4. Gastroesophageal reflux disease, unspecified whether esophagitis present    5. Bilious vomiting with nausea    6. Ex-smoker    7. Anxiety    8.  Narcotic bowel syndrome Plan     Plan EGD to evaluate     The Endoscopic procedure was explained to the patient in detail  The prep and NPO were explained  All the Risks, Benefits, and Alternatives were explained  Risk of Bleeding, Perforation and Cardio Respiratory risks were explained  his questions were answered  The procedure has been scheduled with the  in the office  Patient was asked to give us a call for any questions  The patient has verbalized understanding and agreement to this plan. Avoid narcotics as much as possible     PRN anti emetics     Small frequent meals     Ensure or Boost every day     The patient was instructed to start taking some OTC Probiotics products   These are available over the counter at the Pharmacy stores and Grocery stores  He was explained about the beneficial effects they have in the GI track  They will help to establish the good bacterial rachel and will help with the digestion and bowel movements  The patient has verbalized understanding and agreement to this plan     Pt seems to have signs and symptoms consistent with GERD, acid indigestion and heartburns. He was discussed  in detail about some possible life style and dietary modifications. He was stressed about the maintenance  of appropriate weight and effect of obesity contributing to reflux symptoms. Routine exercise was streesed. Avoidance of Caffeine, nicotine and chocolate were explained. Pt was asked to avoid spices grease and fried food. Advices were also given about avoidance of any kind of fast foods, soda pops and high energy drinks. Pt was advised to place two small block under the head end of the bed which may help with night time reflux. Was advised not to eat any thin at least 2-3 hrs before going to bed and walk especially after dinner     Pt has verbalized understanding and agreement to this plan. Pt was discussed in detail about the possible side effects of proton pump inhibiter therapy.      He was explained about the possibility of calcium and magnesium malabsorption and was advised to start taking calcium supplements with Vit D. Some over the counter regimens were explained to patient. Some dietary advices were also given. He has verbalized understanding and agreement to this. Pt was given instructions and advice in detail about the symptom of constipation. He was explained about avoidance of fast food, soda pops, cheese and red meat. Was also told to avoid sedatives narcotics and pain killers if possible. Pt was advised to start drinking ample amount of water and liquid. Was told to adapt and follow an exercise regimen. Instructions were given to increase the amount of fiber including dietary in terms of bran, cereals, whole wheat, brown bread etc. Was also instructed to start using supplemental fiber either Metamucil, citrucell or bennafiber with ample liquids. He was told to start drinking prune juice which is good for constipation. If symptoms don't resolve he will require medicines to assist with his symptoms     Pt has verbalized understanding and agreement to this plan. More than half of patient's clinic visit time was spent in counseling about lifestyle and dietary modifications  Patient's  questions were answered in this regard as well  The patient has verbalized understanding and agreement         I communicated with the patient and/or health care decision maker about   Details of this discussion including any medical advice provided:YES        I affirm this is a Patient Initiated Episode with an Established Patient who has not had a related appointment within my department in the past 7 days or scheduled within the next 24 hours. Total Time: minutes: 21-30 minutes     Note: not billable if this call serves to triage the patient into an appointment for the relevant concern        Thank you for allowing me to participate in the care of Mr. Levorn Hamman.  For any further questions please do not hesitate to contact me. I have reviewed and agree with the ROS entered by the MA/LPN.             Brandon Wright MD, [facility]  Board Certified in Gastroenterology and [address] [place] [address] Gastroenterology  Office #: [phone]               Revision History

## 2021-05-13 LAB — SURGICAL PATHOLOGY REPORT: NORMAL

## 2021-05-18 ENCOUNTER — TELEPHONE (OUTPATIENT)
Dept: GASTROENTEROLOGY | Age: 76
End: 2021-05-18

## 2021-05-18 NOTE — TELEPHONE ENCOUNTER
Wife left message for a call back for endoscopy and biopsy results. Returned call and informed that we do not give them over the phone. Currently scheduled for 6/16/21. Please advise if the patient needs to be seen sooner. Curious about results.

## 2021-06-09 ENCOUNTER — TELEPHONE (OUTPATIENT)
Dept: GASTROENTEROLOGY | Age: 76
End: 2021-06-09

## 2021-06-09 NOTE — TELEPHONE ENCOUNTER
Spoke with pts wife tiera and she will callback to let us know if pt would like to move his appt up for today

## 2021-06-16 ENCOUNTER — OFFICE VISIT (OUTPATIENT)
Dept: GASTROENTEROLOGY | Age: 76
End: 2021-06-16
Payer: MEDICARE

## 2021-06-16 VITALS
DIASTOLIC BLOOD PRESSURE: 72 MMHG | BODY MASS INDEX: 23.71 KG/M2 | HEIGHT: 64 IN | WEIGHT: 138.9 LBS | HEART RATE: 41 BPM | SYSTOLIC BLOOD PRESSURE: 138 MMHG

## 2021-06-16 DIAGNOSIS — F41.9 ANXIETY: ICD-10-CM

## 2021-06-16 DIAGNOSIS — C88.4 MALTOMA (HCC): ICD-10-CM

## 2021-06-16 DIAGNOSIS — K22.70 BARRETT'S ESOPHAGUS WITHOUT DYSPLASIA: Primary | ICD-10-CM

## 2021-06-16 DIAGNOSIS — T40.601A NARCOTIC BOWEL SYNDROME (HCC): ICD-10-CM

## 2021-06-16 DIAGNOSIS — R11.0 NAUSEA: ICD-10-CM

## 2021-06-16 DIAGNOSIS — K63.89 NARCOTIC BOWEL SYNDROME (HCC): ICD-10-CM

## 2021-06-16 PROBLEM — C88.40 MALTOMA: Status: ACTIVE | Noted: 2021-06-16

## 2021-06-16 PROCEDURE — G8420 CALC BMI NORM PARAMETERS: HCPCS | Performed by: INTERNAL MEDICINE

## 2021-06-16 PROCEDURE — 1123F ACP DISCUSS/DSCN MKR DOCD: CPT | Performed by: INTERNAL MEDICINE

## 2021-06-16 PROCEDURE — 4040F PNEUMOC VAC/ADMIN/RCVD: CPT | Performed by: INTERNAL MEDICINE

## 2021-06-16 PROCEDURE — 99213 OFFICE O/P EST LOW 20 MIN: CPT | Performed by: INTERNAL MEDICINE

## 2021-06-16 PROCEDURE — G8427 DOCREV CUR MEDS BY ELIG CLIN: HCPCS | Performed by: INTERNAL MEDICINE

## 2021-06-16 PROCEDURE — 1036F TOBACCO NON-USER: CPT | Performed by: INTERNAL MEDICINE

## 2021-06-16 ASSESSMENT — ENCOUNTER SYMPTOMS
NAUSEA: 1
VOMITING: 0
CHOKING: 0
ABDOMINAL PAIN: 1
TROUBLE SWALLOWING: 1
WHEEZING: 0
BLOOD IN STOOL: 0
CONSTIPATION: 0
COUGH: 0
ANAL BLEEDING: 0
ABDOMINAL DISTENTION: 0

## 2021-06-16 NOTE — PROGRESS NOTES
GI OFFICE FOLLOW UP    Sabino Manzano is a 67 y.o. male evaluated via on 6/16/2021. Consent:  He and/or health care decision maker is aware that that he may receive a bill for this telephone service, depending on his insurance coverage, and has provided verbal consent to proceed: YES      INTERVAL HISTORY:   No referring provider defined for this encounter. Chief Complaint   Patient presents with    Follow-up     Patient is a egd f/u. Patient notes waking up with nausea, denies current vomiting. Notes occasional abd pain. Notes going to the bathroom ok     Other     Patient get really sick when he bends over       1. Tracy's esophagus without dysplasia    2. Anxiety    3. Narcotic bowel syndrome ([address]    4. Nausea    5. MALToma ([address]       The patient is here as a follow up of his recent GI procedure. The results have been sent to you separately   The findings were explained to the patient in detail and biopsies were also discussed   with him    Seen in my office as a follow-up  Has history for superficial gastric MALToma long time  Recent EGD did not reveal any evidence of recurrence H. pylori or evidence of MALToma  Found to have short segment Tracy's    Has GERD has anxiety issues    Chronic narcotic drug usage has chronic intermittent nausea issues    Significant anxiety issues    No bleeding no melena          HISTORY OF PRESENT ILLNESS: Mr.Lynn NYA Herndon is a 67 y.o. male with a past history remarkable for , referred for evaluation of   Chief Complaint   Patient presents with    Follow-up     Patient is a egd f/u. Patient notes waking up with nausea, denies current vomiting. Notes occasional abd pain. Notes going to the bathroom ok     Other     Patient get really sick when he bends over   .     Past Medical,Family, and Social History reviewed and does contribute to the patient presenting condition. Patient's PMH/PSH,SH,PSYCH Hx, MEDs, ALLERGIES, and ROS were all reviewed and updated in the appropriate sections.     PAST MEDICAL HISTORY:  Past Medical History:   Diagnosis Date    Arthritis     fentanyl patch    Tracy's esophagus     Brunner's gland adenoma     CAD (coronary artery disease) 03/06/2014    stents 2002   CABG 3/6/14    Cancer ([facility]     stomach    CKD (chronic kidney disease)     Colon polyp 11/21/2018    hyperplastic polyp    Dysphagia     Esophagitis     Gastritis     GERD (gastroesophageal reflux disease)     Hx of blood clots     3 clots right leg, left eye    Hyperlipidemia     Hypertension     Lipoma     Myocardial infarction (HCC)     STRESS DONE    Nausea & vomiting     Pyogenic granuloma     Vascular abnormality     CAROTID       Past Surgical History:   Procedure Laterality Date    ARTHROPLASTY      RIGHT KNEE X3-INFECTION    ARTHROPLASTY      LEFT KNEE    BRAIN TUMOR EXCISION  2008    excision    CARDIAC CATHETERIZATION      CAROTID ENDARTERECTOMY      LEFT    CERVICAL DISCECTOMY      CERVICAL SPINE SURGERY  6-25-13    ACF 3-4, 4-5    COLONOSCOPY  11/21/2018    hyperplastic polyp    COLONOSCOPY N/A 11/21/2018    COLONOSCOPY POLYPECTOMY SNARE/COLD BIOPSY performed by Junior Bobby MD at [place] [id number]  3/6/2014    CABG X 3    ENDOSCOPY, COLON, DIAGNOSTIC      INGUINAL HERNIA REPAIR      LEFT    JOINT REPLACEMENT      Bilat knees    LUMBAR DISC SURGERY      HARDWARE    OTHER SURGICAL HISTORY Left 12/12/2017     1) Arch aortography 2) Selective left subclavian angiography 3) Left subclavian artery angioplasty with 7 mm x 40 mm and 8 mm x 40 mm [place] balloons     TONSILLECTOMY      UPPER GASTROINTESTINAL ENDOSCOPY  4/30/2015    UPPER GASTROINTESTINAL ENDOSCOPY  5/18/16    lipomatous lump; esophagitis; pyogenic granuloma; mild gastritis    UPPER GASTROINTESTINAL ENDOSCOPY  01/25/2017    lipoma; prominet Brunner's gland; gastritis    UPPER GASTROINTESTINAL ENDOSCOPY  08/08/2018    CASTILLO'S    UPPER GASTROINTESTINAL ENDOSCOPY  8/8/2018    EGD BIOPSY performed by Bhavya Petit MD at [address]  09/04/2019    EGD BIOPSY     UPPER GASTROINTESTINAL ENDOSCOPY N/A 9/4/2019    EGD BIOPSY performed by Bhavya Petit MD at [address] N/A 5/12/2021    EGD BIOPSY performed by Bhavya Petit MD at [facility]:    Current Outpatient Medications:     ondansetron (ZOFRAN-ODT) 4 MG disintegrating tablet, DISSOLVE 1 TABLET ON THE TONGUE EVERY 8 HOURS AS NEEDED FOR NAUSEA OR VOMITING, Disp: 90 tablet, Rfl: 3    furosemide (LASIX) 40 MG tablet, Take 1 tablet by mouth daily (Patient taking differently: Take 40 mg by mouth daily Pt takes 1/2 tab daily), Disp: 60 tablet, Rfl: 3    atorvastatin (LIPITOR) 40 MG tablet, Take 1 tablet by mouth nightly, Disp: 30 tablet, Rfl: 3    omeprazole (PRILOSEC) 20 MG delayed release capsule, TAKE ONE CAPSULE BY MOUTH DAILY, Disp: 180 capsule, Rfl: 1    nitroGLYCERIN (NITROSTAT) 0.4 MG SL tablet, up to max of 3 total doses.  If no relief after 1 dose, call 911., Disp: 25 tablet, Rfl: 3    metoprolol tartrate (LOPRESSOR) 25 MG tablet, Take 0.25 tablets by mouth 2 times daily (Patient taking differently: Take 12.5 mg by mouth 2 times daily Takes half a pill = 12.5 mg twice daily), Disp: 60 tablet, Rfl: 3    HYDROcodone-acetaminophen (NORCO)  MG per tablet, Take 1 tablet by mouth every 4 hours as needed for Pain ., Disp: , Rfl:     Ascorbic Acid (VITAMIN C) 1000 MG tablet, Take 1,000 mg by mouth daily, Disp: , Rfl:     ALPRAZolam (XANAX PO), Take 0.5 tablets by mouth 2 times daily as needed , Disp: , Rfl:     albuterol (PROVENTIL HFA;VENTOLIN HFA) 108 (90 BASE) MCG/ACT inhaler, Inhale 2 puffs into the lungs every 6 hours as needed for Wheezing., Disp: , Rfl:     fentaNYL (DURAGESIC) 50 MCG/HR, Place 1 patch onto the skin every other day. , Disp: , Rfl:     aspirin 81 MG chewable tablet, Take 1 tablet by mouth daily. , Disp: 30 tablet, Rfl: 11    ALLERGIES:   Allergies   Allergen Reactions    Vancomycin      AFFECTS KIDNEYS         FAMILY HISTORY:       Problem Relation Age of Onset    Lung Cancer Father     Stroke Maternal Grandmother          SOCIAL HISTORY:   Social History     Socioeconomic History    Marital status:      Spouse name: Not on file    Number of children: Not on file    Years of education: Not on file    Highest education level: Not on file   Occupational History    Not on file   Tobacco Use    Smoking status: Former Smoker     Packs/day: 0.25     Years: 30.00     Pack years: 7.50     Types: Cigarettes     Quit date: 5/1/2018     Years since quitting: 3.1    Smokeless tobacco: Former User    Tobacco comment: QUIT CIGARETTES/SMOKES CIGARS   Vaping Use    Vaping Use: Never used   Substance and Sexual Activity    Alcohol use: No     Comment: QUIT; H/O ETOH abuse    Drug use: No    Sexual activity: Not on file   Other Topics Concern    Not on file   Social History Narrative    Not on file     Social Determinants of Health     Financial Resource Strain:     Difficulty of Paying Living Expenses:    Food Insecurity:     Worried About [address] [place] in the Last Year:     [address] in the Last Year:    Transportation Needs:     Lack of Transportation (Medical):      Lack of Transportation (Non-Medical):    Physical Activity:     Days of Exercise per Week:     Minutes of Exercise per Session:    Stress:     Feeling of Stress :    Social Connections:     Frequency of Communication with Friends and Family:     Frequency of Social Gatherings with Friends and Family:     Attends Denominational Services:     Active Member of Clubs or Organizations:     Attends Club or Organization Meetings:     Marital Status:    Intimate Partner Violence:     Fear of Current or Ex-Partner:  Emotionally Abused:     Physically Abused:     Sexually Abused:          REVIEW OF SYSTEMS:         Review of Systems   Constitutional: Positive for appetite change, fatigue and unexpected weight change (lost). HENT: Positive for trouble swallowing (medications). Eyes: Positive for visual disturbance. Respiratory: Negative for cough, choking and wheezing. Cardiovascular: Negative. Gastrointestinal: Positive for abdominal pain and nausea. Negative for abdominal distention, anal bleeding, blood in stool, constipation and vomiting. Neurological: Positive for headaches. Hematological: Bruises/bleeds easily. Psychiatric/Behavioral: Positive for sleep disturbance. PHYSICAL EXAMINATION: Vital signs reviewed per the nursing documentation. /72   Pulse (!) 41   Ht 5' 4\" (1.626 m)   Wt 138 lb 14.4 oz (63 kg)   BMI 23.84 kg/m²   Body mass index is 23.84 kg/m². Physical Exam  Nursing note reviewed. Constitutional:       Appearance: He is well-developed. Comments: Anxious    HENT:      Head: Normocephalic and atraumatic. Eyes:      Conjunctiva/sclera: Conjunctivae normal.      Pupils: Pupils are equal, round, and reactive to light. Cardiovascular:      Rate and Rhythm: Normal rate and regular rhythm. Pulmonary:      Effort: Pulmonary effort is normal.      Breath sounds: Normal breath sounds. Abdominal:      General: Bowel sounds are normal.      Palpations: Abdomen is soft. Comments: NON TENDER, NON DISTENTED  LIVER SPLEEN AND HERNIAS ARE NOT  PALPABLE  BOWEL SOUNDS ARE POSITIVE      Genitourinary:     Rectum: Normal.   Musculoskeletal:         General: Normal range of motion. Cervical back: Normal range of motion and neck supple. Skin:     General: Skin is warm. Neurological:      Mental Status: He is alert and oriented to person, place, and time. Deep Tendon Reflexes: Reflexes are normal and symmetric.            LABORATORY DATA: Reviewed  Lab Results   Component Value Date    WBC 8.4 02/25/2021    HGB 11.7 (L) 02/25/2021    HCT 35.9 (L) 02/25/2021    MCV 95.0 02/25/2021     02/25/2021     02/26/2021    K 3.9 02/26/2021     02/26/2021    CO2 30 02/26/2021    BUN 25 (H) 02/26/2021    CREATININE 1.55 (H) 02/26/2021    LABALBU 4.2 02/24/2021    BILITOT 0.60 02/24/2021    ALKPHOS 85 02/24/2021    AST 18 02/24/2021    ALT 7 02/24/2021    INR 1.1 02/24/2021         Lab Results   Component Value Date    RBC 3.78 (L) 02/25/2021    HGB 11.7 (L) 02/25/2021    MCV 95.0 02/25/2021    MCH 31.0 02/25/2021    MCHC 32.6 02/25/2021    RDW 15.1 (H) 02/25/2021    MPV 9.8 02/25/2021    BASOPCT 1 02/24/2021    LYMPHSABS 1.80 02/24/2021    MONOSABS 0.70 02/24/2021    NEUTROABS 6.40 02/24/2021    EOSABS 0.10 02/24/2021    BASOSABS 0.10 02/24/2021         DIAGNOSTIC TESTING:     No results found. Assessment  1. Tracy's esophagus without dysplasia    2. Anxiety    3. Narcotic bowel syndrome ([address]    4. Nausea    5. MALToma ([facility]        Plan    Cont PPI    Pt was discussed in detail about the possible side effects of proton pump inhibiter therapy. He was explained about the possibility of calcium and magnesium malabsorption and was advised to start taking calcium supplements with Vit D. Some over the counter regimens were explained to patient. Some dietary advices were also given. He has verbalized understanding and agreement to this. Pt seems to have signs and symptoms consistent with GERD, acid indigestion and heartburns. He was discussed  in detail about some possible life style and dietary modifications. He was stressed about the maintenance  of appropriate weight and effect of obesity contributing to reflux symptoms. Routine exercise was streesed. Avoidance of Caffeine, nicotine and chocolate were explained. Pt was asked to avoid spices grease and fried food.  Advices were also given about avoidance of any kind of fast foods, soda pops and

## 2021-07-02 ENCOUNTER — HOSPITAL ENCOUNTER (OUTPATIENT)
Facility: MEDICAL CENTER | Age: 76
End: 2021-07-02
Payer: MEDICARE

## 2021-07-08 ENCOUNTER — HOSPITAL ENCOUNTER (OUTPATIENT)
Facility: MEDICAL CENTER | Age: 76
End: 2021-07-08
Payer: MEDICARE

## 2021-07-09 ENCOUNTER — HOSPITAL ENCOUNTER (OUTPATIENT)
Facility: MEDICAL CENTER | Age: 76
Discharge: HOME | End: 2021-07-09
Payer: MEDICARE

## 2021-07-09 DIAGNOSIS — C88.4 MALT (MUCOSA ASSOCIATED LYMPHOID TISSUE) (HCC): Primary | ICD-10-CM

## 2021-07-09 DIAGNOSIS — C88.4 MALT (MUCOSA ASSOCIATED LYMPHOID TISSUE) (HCC): ICD-10-CM

## 2021-07-09 LAB
ABSOLUTE EOS #: 0.41 K/UL (ref 0–0.44)
ABSOLUTE IMMATURE GRANULOCYTE: 0.03 K/UL (ref 0–0.3)
ABSOLUTE LYMPH #: 2.18 K/UL (ref 1.1–3.7)
ABSOLUTE MONO #: 1.01 K/UL (ref 0.1–1.2)
ALBUMIN SERPL-MCNC: 4.1 G/DL (ref 3.5–5.2)
ALBUMIN/GLOBULIN RATIO: ABNORMAL (ref 1–2.5)
ALP BLD-CCNC: 100 U/L (ref 40–129)
ALT SERPL-CCNC: 21 U/L (ref 5–41)
ANION GAP SERPL CALCULATED.3IONS-SCNC: 10 MMOL/L (ref 9–17)
AST SERPL-CCNC: 26 U/L
BASOPHILS # BLD: 1 % (ref 0–2)
BASOPHILS ABSOLUTE: 0.11 K/UL (ref 0–0.2)
BILIRUB SERPL-MCNC: 0.59 MG/DL (ref 0.3–1.2)
BUN BLDV-MCNC: 27 MG/DL (ref 8–23)
BUN/CREAT BLD: 13 (ref 9–20)
CALCIUM SERPL-MCNC: 9.4 MG/DL (ref 8.6–10.4)
CHLORIDE BLD-SCNC: 101 MMOL/L (ref 98–107)
CO2: 28 MMOL/L (ref 20–31)
CREAT SERPL-MCNC: 2.08 MG/DL (ref 0.7–1.2)
DIFFERENTIAL TYPE: ABNORMAL
EOSINOPHILS RELATIVE PERCENT: 5 % (ref 1–4)
GFR AFRICAN AMERICAN: 38 ML/MIN
GFR NON-AFRICAN AMERICAN: 31 ML/MIN
GFR SERPL CREATININE-BSD FRML MDRD: ABNORMAL ML/MIN/{1.73_M2}
GFR SERPL CREATININE-BSD FRML MDRD: ABNORMAL ML/MIN/{1.73_M2}
GLUCOSE BLD-MCNC: 86 MG/DL (ref 70–99)
HCT VFR BLD CALC: 37.4 % (ref 40.7–50.3)
HEMOGLOBIN: 11.9 G/DL (ref 13–17)
IMMATURE GRANULOCYTES: 0 %
LACTATE DEHYDROGENASE: 205 U/L (ref 135–225)
LYMPHOCYTES # BLD: 26 % (ref 24–43)
MCH RBC QN AUTO: 31.3 PG (ref 25.2–33.5)
MCHC RBC AUTO-ENTMCNC: 31.8 G/DL (ref 28.4–34.8)
MCV RBC AUTO: 98.4 FL (ref 82.6–102.9)
MONOCYTES # BLD: 12 % (ref 3–12)
NRBC AUTOMATED: 0 PER 100 WBC
PDW BLD-RTO: 14.8 % (ref 11.8–14.4)
PLATELET # BLD: 181 K/UL (ref 138–453)
PLATELET ESTIMATE: ABNORMAL
PMV BLD AUTO: 10.6 FL (ref 8.1–13.5)
POTASSIUM SERPL-SCNC: 4.7 MMOL/L (ref 3.7–5.3)
RBC # BLD: 3.8 M/UL (ref 4.21–5.77)
RBC # BLD: ABNORMAL 10*6/UL
SEG NEUTROPHILS: 56 % (ref 36–65)
SEGMENTED NEUTROPHILS ABSOLUTE COUNT: 4.74 K/UL (ref 1.5–8.1)
SODIUM BLD-SCNC: 139 MMOL/L (ref 135–144)
TOTAL PROTEIN: 6.8 G/DL (ref 6.4–8.3)
WBC # BLD: 8.5 K/UL (ref 3.5–11.3)
WBC # BLD: ABNORMAL 10*3/UL

## 2021-07-09 PROCEDURE — 85025 COMPLETE CBC W/AUTO DIFF WBC: CPT

## 2021-07-09 PROCEDURE — 80053 COMPREHEN METABOLIC PANEL: CPT

## 2021-07-09 PROCEDURE — 36415 COLL VENOUS BLD VENIPUNCTURE: CPT

## 2021-07-09 PROCEDURE — 83615 LACTATE (LD) (LDH) ENZYME: CPT

## 2021-07-16 ENCOUNTER — TELEPHONE (OUTPATIENT)
Dept: INFUSION THERAPY | Facility: MEDICAL CENTER | Age: 76
End: 2021-07-16

## 2021-07-16 ENCOUNTER — TELEPHONE (OUTPATIENT)
Dept: ONCOLOGY | Age: 76
End: 2021-07-16

## 2021-07-16 ENCOUNTER — OFFICE VISIT (OUTPATIENT)
Dept: ONCOLOGY | Age: 76
End: 2021-07-16
Payer: MEDICARE

## 2021-07-16 VITALS
WEIGHT: 143.2 LBS | SYSTOLIC BLOOD PRESSURE: 134 MMHG | BODY MASS INDEX: 24.58 KG/M2 | DIASTOLIC BLOOD PRESSURE: 63 MMHG | TEMPERATURE: 98.4 F | HEART RATE: 40 BPM

## 2021-07-16 DIAGNOSIS — C88.4 MALT (MUCOSA ASSOCIATED LYMPHOID TISSUE) (HCC): ICD-10-CM

## 2021-07-16 PROCEDURE — 1123F ACP DISCUSS/DSCN MKR DOCD: CPT | Performed by: INTERNAL MEDICINE

## 2021-07-16 PROCEDURE — 99211 OFF/OP EST MAY X REQ PHY/QHP: CPT | Performed by: INTERNAL MEDICINE

## 2021-07-16 PROCEDURE — G8420 CALC BMI NORM PARAMETERS: HCPCS | Performed by: INTERNAL MEDICINE

## 2021-07-16 PROCEDURE — G8427 DOCREV CUR MEDS BY ELIG CLIN: HCPCS | Performed by: INTERNAL MEDICINE

## 2021-07-16 PROCEDURE — 1036F TOBACCO NON-USER: CPT | Performed by: INTERNAL MEDICINE

## 2021-07-16 PROCEDURE — 99214 OFFICE O/P EST MOD 30 MIN: CPT | Performed by: INTERNAL MEDICINE

## 2021-07-16 PROCEDURE — 4040F PNEUMOC VAC/ADMIN/RCVD: CPT | Performed by: INTERNAL MEDICINE

## 2021-07-16 NOTE — PROGRESS NOTES
Reason for the visit:   Chief Complaint   Patient presents with    Follow-Up from [address] [place] [address]    Leg Swelling       Pertinent Clinical Problems/ Treatments:    1. Gastric Maltoma, stage IE,PET scan and bone marrow biopsy negative,  2. H. Pylori negative by histology,but + by stool antigen test  3. CAD, CK D, anemia  4. Treatment: Amoxicillin plus Biaxin plus Protonix, started  September 1 2015  5. Observation/ surveillance     Summary of the case/HPI :    He is a 69-year-old patient who is poor historian and with multiple comorbidities including CAD presented in the last few months with chronic nausea as well as heartburn. Denied any bleeding or melena, he had no fever or chills or night sweats. 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 He was referred to [facility] for EUS and Endo mucosal resection which was done in June. Final path consisted with the same histology and margins were positive. Tested for H. pylori and that was positive. The decision was to treat him with anti-H. pylori treatment   He attained good remission, and was observed since then. 12/2017 he developed amaurosis fugax, arthrosclerotic plaque on left carotid artery, plan is carotid endartectomy. 08/2018 EGD, 11/2018 colonoscopy, inflammation was seen in the stomach (mild chronic gastritis)   12/2018 he presented in ER with chest pains, was started on medication, cardiology follow up scheduled. 06/2019 his nausea and shortness of breath have progressively worsened, schedule next available appointment with GI and CT. INTERIM HISTORY: The patent is here for a follow up for MALT. He underwent an EGD in May/2021 that showed diffuse gastritis but no evidence of malignancy or recurrent lymphoma.  He reports he has persistent fatigue and leg swelling that inhibits daily activity. He was in house with pleural effusion, he was started on Lasix, he has consulted with cardiology but unsure of details, likely CHF. Past Medical History   has a past medical history of Arthritis, Tracy's esophagus, Brunner's gland adenoma, CAD (coronary artery disease), [facility] [address] [address] CKD (chronic kidney disease), Colon polyp, Dysphagia, Esophagitis, Gastritis, GERD (gastroesophageal reflux disease), Hx of blood clots, Hyperlipidemia, Hypertension, Lipoma, Myocardial infarction ([facility] [address] Nausea & vomiting, Pyogenic granuloma, and Vascular abnormality. Surgical History   has a past surgical history that includes Cardiac catheterization; Carotid endarterectomy; Cervical discectomy; Lumbar disc surgery; arthroplasty; arthroplasty; Tonsillectomy; Inguinal hernia repair; Brain tumor excision (2008); Cervical spine surgery (6-25-13); Coronary artery bypass graft (3/6/2014); Upper gastrointestinal endoscopy (4/30/2015); Upper gastrointestinal endoscopy (5/18/16); Upper gastrointestinal endoscopy (01/25/2017); joint replacement; other surgical history (Left, 12/12/2017); Upper gastrointestinal endoscopy (08/08/2018); Upper gastrointestinal endoscopy (8/8/2018); Colonoscopy (11/21/2018); Colonoscopy (N/A, 11/21/2018); Endoscopy, colon, diagnostic; Upper gastrointestinal endoscopy (09/04/2019); Upper gastrointestinal endoscopy (N/A, 9/4/2019); and Upper gastrointestinal endoscopy (N/A, 5/12/2021). Home Medications  has a current medication list which includes the following prescription(s): ondansetron, furosemide, atorvastatin, omeprazole, metoprolol tartrate, hydrocodone-acetaminophen, vitamin c, alprazolam, albuterol sulfate hfa, fentanyl, aspirin, and nitroglycerin. Allergies:  Vancomycin      Review of Systems :      Constitutional: Moderate fatigue, nausea; depression and fatigue  HEENT: negative for sore mouth, sore throat, hoarseness and voice change; blind in left eye - as noted.  07/09/2021       Chemistry        Component Value Date/Time     07/09/2021 0957    K 4.7 07/09/2021 0957     07/09/2021 0957    CO2 28 07/09/2021 0957    BUN 27 (H) 07/09/2021 0957    CREATININE 2.08 (H) 07/09/2021 0957        Component Value Date/Time    CALCIUM 9.4 07/09/2021 0957    ALKPHOS 100 07/09/2021 0957    AST 26 07/09/2021 0957    ALT 21 07/09/2021 0957    BILITOT 0.59 07/09/2021 0957            PATHOLOGY:      REVIEW OF RADIOLOGICAL RESULTS:      1.  No acute intrathoracic abnormality. 2.  Centrilobular emphysema, similar to prior study. 3.  Extensive degenerative changes in the lower thoracic and upper lumbar spine. Assessment:    1. Gastric MALTOMA, s/p EMR with postive margins,H. Pylori negative by IHC during scope but positive antigen test in the stool. Treated successfully with H pylori eradication . Treatment started 9/2015    2. EGD showed no active lymphoma, he was reassured. Ct scan was also reassuring   3. Pulm nodules, relatively stable . 4. Multiple comorbidities in the form of CAD, Anemia and CK D  5. Nausea and shortness of breath- follow up with GI  6. CT showed stable fidnings      Plan:   1. His lab work was reviewed, creatinine is elevated with Lasix, counts and electrolytes in range. 2. We reviewed EGD, biopsy was negative. 3. I assured him he remains in remission. 4. We will continue with surveillance per the guidelines. 5. Exam shows continued LE edema and fluid in the lungs. 6. I feel his cardiac condition is poorly controled, he is very symptomatic, he would like referral to new cardiologist and I am referring him if his wife has not scheduled him. 7. Return in 6 months.      Hali Clock MD Tanja  Hematologist/Medical Oncologist  Cell: [phone]

## 2021-07-16 NOTE — TELEPHONE ENCOUNTER
[facility] HERE FOR MD VISIT  RV IN 6 MTHS W/ CDP CMP  LABS CDP CMP 1/2022  MD VISIT 1/2022  AVS PRINTED W/ INSTRUCTIONS AND GIVEN TO PT ON EXIT

## 2021-07-16 NOTE — TELEPHONE ENCOUNTER
Patient seen today by MD.  Wife calling ofr name of cardiologist.  No name in MD note and no referral made. [phone].

## 2021-08-23 ENCOUNTER — APPOINTMENT (OUTPATIENT)
Dept: CT IMAGING | Age: 76
End: 2021-08-23
Payer: MEDICARE

## 2021-08-23 ENCOUNTER — HOSPITAL ENCOUNTER (OUTPATIENT)
Age: 76
Setting detail: OBSERVATION
Discharge: HOME | End: 2021-08-25
Attending: EMERGENCY MEDICINE | Admitting: EMERGENCY MEDICINE
Payer: MEDICARE

## 2021-08-23 ENCOUNTER — APPOINTMENT (OUTPATIENT)
Dept: GENERAL RADIOLOGY | Age: 76
End: 2021-08-23
Payer: MEDICARE

## 2021-08-23 DIAGNOSIS — R06.01 ORTHOPNEA: ICD-10-CM

## 2021-08-23 DIAGNOSIS — M79.89 LEG SWELLING: ICD-10-CM

## 2021-08-23 DIAGNOSIS — R07.9 CHEST PAIN, UNSPECIFIED TYPE: Primary | ICD-10-CM

## 2021-08-23 LAB
ABSOLUTE EOS #: 0.27 K/UL (ref 0–0.44)
ABSOLUTE IMMATURE GRANULOCYTE: 0.03 K/UL (ref 0–0.3)
ABSOLUTE LYMPH #: 2.01 K/UL (ref 1.1–3.7)
ABSOLUTE MONO #: 0.61 K/UL (ref 0.1–1.2)
ANION GAP SERPL CALCULATED.3IONS-SCNC: 11 MMOL/L (ref 9–17)
BASOPHILS # BLD: 1 % (ref 0–2)
BASOPHILS ABSOLUTE: 0.09 K/UL (ref 0–0.2)
BNP INTERPRETATION: ABNORMAL
BUN BLDV-MCNC: 27 MG/DL (ref 8–23)
BUN/CREAT BLD: ABNORMAL (ref 9–20)
CALCIUM SERPL-MCNC: 9.5 MG/DL (ref 8.6–10.4)
CHLORIDE BLD-SCNC: 102 MMOL/L (ref 98–107)
CO2: 26 MMOL/L (ref 20–31)
CREAT SERPL-MCNC: 1.96 MG/DL (ref 0.7–1.2)
D-DIMER QUANTITATIVE: 0.65 MG/L FEU
DIFFERENTIAL TYPE: ABNORMAL
EOSINOPHILS RELATIVE PERCENT: 3 % (ref 1–4)
GFR AFRICAN AMERICAN: 40 ML/MIN
GFR NON-AFRICAN AMERICAN: 33 ML/MIN
GFR SERPL CREATININE-BSD FRML MDRD: ABNORMAL ML/MIN/{1.73_M2}
GFR SERPL CREATININE-BSD FRML MDRD: ABNORMAL ML/MIN/{1.73_M2}
GLUCOSE BLD-MCNC: 126 MG/DL (ref 70–99)
HCT VFR BLD CALC: 35.9 % (ref 40.7–50.3)
HEMOGLOBIN: 11.7 G/DL (ref 13–17)
IMMATURE GRANULOCYTES: 0 %
LYMPHOCYTES # BLD: 24 % (ref 24–43)
MCH RBC QN AUTO: 31.7 PG (ref 25.2–33.5)
MCHC RBC AUTO-ENTMCNC: 32.6 G/DL (ref 28.4–34.8)
MCV RBC AUTO: 97.3 FL (ref 82.6–102.9)
MONOCYTES # BLD: 7 % (ref 3–12)
NRBC AUTOMATED: 0 PER 100 WBC
PDW BLD-RTO: 15.2 % (ref 11.8–14.4)
PLATELET # BLD: 190 K/UL (ref 138–453)
PLATELET ESTIMATE: ABNORMAL
PMV BLD AUTO: 10.8 FL (ref 8.1–13.5)
POTASSIUM SERPL-SCNC: 4.7 MMOL/L (ref 3.7–5.3)
PRO-BNP: 6271 PG/ML
RBC # BLD: 3.69 M/UL (ref 4.21–5.77)
RBC # BLD: ABNORMAL 10*6/UL
SEG NEUTROPHILS: 65 % (ref 36–65)
SEGMENTED NEUTROPHILS ABSOLUTE COUNT: 5.44 K/UL (ref 1.5–8.1)
SODIUM BLD-SCNC: 139 MMOL/L (ref 135–144)
TROPONIN INTERP: ABNORMAL
TROPONIN INTERP: NORMAL
TROPONIN T: ABNORMAL NG/ML
TROPONIN T: NORMAL NG/ML
TROPONIN, HIGH SENSITIVITY: 20 NG/L (ref 0–22)
TROPONIN, HIGH SENSITIVITY: 26 NG/L (ref 0–22)
WBC # BLD: 8.5 K/UL (ref 3.5–11.3)
WBC # BLD: ABNORMAL 10*3/UL

## 2021-08-23 PROCEDURE — 80048 BASIC METABOLIC PNL TOTAL CA: CPT

## 2021-08-23 PROCEDURE — 6360000002 HC RX W HCPCS: Performed by: STUDENT IN AN ORGANIZED HEALTH CARE EDUCATION/TRAINING PROGRAM

## 2021-08-23 PROCEDURE — 84484 ASSAY OF TROPONIN QUANT: CPT

## 2021-08-23 PROCEDURE — 6370000000 HC RX 637 (ALT 250 FOR IP): Performed by: STUDENT IN AN ORGANIZED HEALTH CARE EDUCATION/TRAINING PROGRAM

## 2021-08-23 PROCEDURE — 96374 THER/PROPH/DIAG INJ IV PUSH: CPT

## 2021-08-23 PROCEDURE — 85379 FIBRIN DEGRADATION QUANT: CPT

## 2021-08-23 PROCEDURE — 85025 COMPLETE CBC W/AUTO DIFF WBC: CPT

## 2021-08-23 PROCEDURE — 96375 TX/PRO/DX INJ NEW DRUG ADDON: CPT

## 2021-08-23 PROCEDURE — 71046 X-RAY EXAM CHEST 2 VIEWS: CPT

## 2021-08-23 PROCEDURE — 70450 CT HEAD/BRAIN W/O DYE: CPT

## 2021-08-23 PROCEDURE — 83880 ASSAY OF NATRIURETIC PEPTIDE: CPT

## 2021-08-23 PROCEDURE — 93005 ELECTROCARDIOGRAM TRACING: CPT | Performed by: STUDENT IN AN ORGANIZED HEALTH CARE EDUCATION/TRAINING PROGRAM

## 2021-08-23 PROCEDURE — 99285 EMERGENCY DEPT VISIT HI MDM: CPT

## 2021-08-23 RX ORDER — PROCHLORPERAZINE EDISYLATE 5 MG/ML
10 INJECTION INTRAMUSCULAR; INTRAVENOUS ONCE
Status: COMPLETED | OUTPATIENT
Start: 2021-08-23 | End: 2021-08-23

## 2021-08-23 RX ORDER — DIPHENHYDRAMINE HYDROCHLORIDE 50 MG/ML
25 INJECTION INTRAMUSCULAR; INTRAVENOUS ONCE
Status: COMPLETED | OUTPATIENT
Start: 2021-08-23 | End: 2021-08-23

## 2021-08-23 RX ORDER — ASPIRIN 81 MG/1
324 TABLET, CHEWABLE ORAL ONCE
Status: COMPLETED | OUTPATIENT
Start: 2021-08-23 | End: 2021-08-23

## 2021-08-23 RX ORDER — FUROSEMIDE 10 MG/ML
20 INJECTION INTRAMUSCULAR; INTRAVENOUS ONCE
Status: COMPLETED | OUTPATIENT
Start: 2021-08-24 | End: 2021-08-24

## 2021-08-23 RX ADMIN — ASPIRIN 324 MG: 81 TABLET, CHEWABLE ORAL at 20:35

## 2021-08-23 RX ADMIN — PROCHLORPERAZINE EDISYLATE 10 MG: 5 INJECTION INTRAMUSCULAR; INTRAVENOUS at 22:06

## 2021-08-23 RX ADMIN — DIPHENHYDRAMINE HYDROCHLORIDE 25 MG: 50 INJECTION INTRAMUSCULAR; INTRAVENOUS at 22:06

## 2021-08-23 ASSESSMENT — PAIN DESCRIPTION - FREQUENCY: FREQUENCY: CONTINUOUS

## 2021-08-23 ASSESSMENT — PAIN DESCRIPTION - PAIN TYPE: TYPE: ACUTE PAIN

## 2021-08-23 ASSESSMENT — PAIN DESCRIPTION - PROGRESSION: CLINICAL_PROGRESSION: NOT CHANGED

## 2021-08-23 ASSESSMENT — PAIN DESCRIPTION - LOCATION: LOCATION: CHEST

## 2021-08-23 ASSESSMENT — PAIN DESCRIPTION - ONSET: ONSET: ON-GOING

## 2021-08-23 ASSESSMENT — PAIN DESCRIPTION - DESCRIPTORS: DESCRIPTORS: ACHING

## 2021-08-23 ASSESSMENT — PAIN SCALES - GENERAL: PAINLEVEL_OUTOF10: 4

## 2021-08-24 ENCOUNTER — APPOINTMENT (OUTPATIENT)
Dept: ULTRASOUND IMAGING | Age: 76
End: 2021-08-24
Payer: MEDICARE

## 2021-08-24 LAB
ANION GAP SERPL CALCULATED.3IONS-SCNC: 10 MMOL/L (ref 9–17)
BUN BLDV-MCNC: 27 MG/DL (ref 8–23)
BUN/CREAT BLD: ABNORMAL (ref 9–20)
CALCIUM SERPL-MCNC: 9.4 MG/DL (ref 8.6–10.4)
CHLORIDE BLD-SCNC: 103 MMOL/L (ref 98–107)
CO2: 27 MMOL/L (ref 20–31)
CREAT SERPL-MCNC: 1.88 MG/DL (ref 0.7–1.2)
EKG ATRIAL RATE: 43 BPM
EKG ATRIAL RATE: 47 BPM
EKG P AXIS: 36 DEGREES
EKG P-R INTERVAL: 156 MS
EKG P-R INTERVAL: 184 MS
EKG Q-T INTERVAL: 454 MS
EKG Q-T INTERVAL: 536 MS
EKG QRS DURATION: 74 MS
EKG QRS DURATION: 98 MS
EKG QTC CALCULATION (BAZETT): 401 MS
EKG QTC CALCULATION (BAZETT): 452 MS
EKG R AXIS: 73 DEGREES
EKG R AXIS: 74 DEGREES
EKG T AXIS: 105 DEGREES
EKG T AXIS: 80 DEGREES
EKG VENTRICULAR RATE: 43 BPM
EKG VENTRICULAR RATE: 47 BPM
ESTIMATED AVERAGE GLUCOSE: 100 MG/DL
GFR AFRICAN AMERICAN: 42 ML/MIN
GFR NON-AFRICAN AMERICAN: 35 ML/MIN
GFR SERPL CREATININE-BSD FRML MDRD: ABNORMAL ML/MIN/{1.73_M2}
GFR SERPL CREATININE-BSD FRML MDRD: ABNORMAL ML/MIN/{1.73_M2}
GLUCOSE BLD-MCNC: 93 MG/DL (ref 70–99)
HBA1C MFR BLD: 5.1 % (ref 4–6)
MAGNESIUM: 2.3 MG/DL (ref 1.6–2.6)
POTASSIUM SERPL-SCNC: 4.4 MMOL/L (ref 3.7–5.3)
SARS-COV-2, RAPID: NOT DETECTED
SODIUM BLD-SCNC: 140 MMOL/L (ref 135–144)
SPECIMEN DESCRIPTION: NORMAL
TSH SERPL DL<=0.05 MIU/L-ACNC: 1.66 MIU/L (ref 0.3–5)

## 2021-08-24 PROCEDURE — 76770 US EXAM ABDO BACK WALL COMP: CPT

## 2021-08-24 PROCEDURE — 2580000003 HC RX 258: Performed by: STUDENT IN AN ORGANIZED HEALTH CARE EDUCATION/TRAINING PROGRAM

## 2021-08-24 PROCEDURE — 93005 ELECTROCARDIOGRAM TRACING: CPT | Performed by: EMERGENCY MEDICINE

## 2021-08-24 PROCEDURE — G0378 HOSPITAL OBSERVATION PER HR: HCPCS

## 2021-08-24 PROCEDURE — 99220 PR INITIAL OBSERVATION CARE/DAY 70 MINUTES: CPT | Performed by: INTERNAL MEDICINE

## 2021-08-24 PROCEDURE — 84165 PROTEIN E-PHORESIS SERUM: CPT

## 2021-08-24 PROCEDURE — 84155 ASSAY OF PROTEIN SERUM: CPT

## 2021-08-24 PROCEDURE — 6370000000 HC RX 637 (ALT 250 FOR IP): Performed by: STUDENT IN AN ORGANIZED HEALTH CARE EDUCATION/TRAINING PROGRAM

## 2021-08-24 PROCEDURE — 96375 TX/PRO/DX INJ NEW DRUG ADDON: CPT

## 2021-08-24 PROCEDURE — 36415 COLL VENOUS BLD VENIPUNCTURE: CPT

## 2021-08-24 PROCEDURE — 93970 EXTREMITY STUDY: CPT

## 2021-08-24 PROCEDURE — 87635 SARS-COV-2 COVID-19 AMP PRB: CPT

## 2021-08-24 PROCEDURE — 80048 BASIC METABOLIC PNL TOTAL CA: CPT

## 2021-08-24 PROCEDURE — 6360000002 HC RX W HCPCS: Performed by: STUDENT IN AN ORGANIZED HEALTH CARE EDUCATION/TRAINING PROGRAM

## 2021-08-24 PROCEDURE — 83735 ASSAY OF MAGNESIUM: CPT

## 2021-08-24 PROCEDURE — 84443 ASSAY THYROID STIM HORMONE: CPT

## 2021-08-24 PROCEDURE — 83036 HEMOGLOBIN GLYCOSYLATED A1C: CPT

## 2021-08-24 RX ORDER — OMEPRAZOLE 10 MG/1
10 CAPSULE, DELAYED RELEASE ORAL EVERY MORNING
Status: DISCONTINUED | OUTPATIENT
Start: 2021-08-24 | End: 2021-08-25 | Stop reason: HOSPADM

## 2021-08-24 RX ORDER — ALPRAZOLAM 0.5 MG/1
0.5 TABLET ORAL 2 TIMES DAILY PRN
Status: DISCONTINUED | OUTPATIENT
Start: 2021-08-24 | End: 2021-08-25 | Stop reason: HOSPADM

## 2021-08-24 RX ORDER — FUROSEMIDE 40 MG/1
40 TABLET ORAL DAILY
Status: DISCONTINUED | OUTPATIENT
Start: 2021-08-24 | End: 2021-08-25

## 2021-08-24 RX ORDER — AMLODIPINE BESYLATE 5 MG/1
5 TABLET ORAL DAILY
Status: DISCONTINUED | OUTPATIENT
Start: 2021-08-24 | End: 2021-08-25 | Stop reason: HOSPADM

## 2021-08-24 RX ORDER — ONDANSETRON 4 MG/1
4 TABLET, ORALLY DISINTEGRATING ORAL EVERY 8 HOURS PRN
Status: DISCONTINUED | OUTPATIENT
Start: 2021-08-24 | End: 2021-08-24 | Stop reason: SDUPTHER

## 2021-08-24 RX ORDER — ACETAMINOPHEN 325 MG/1
650 TABLET ORAL EVERY 4 HOURS PRN
Status: DISCONTINUED | OUTPATIENT
Start: 2021-08-24 | End: 2021-08-25 | Stop reason: HOSPADM

## 2021-08-24 RX ORDER — ASCORBIC ACID 500 MG
1000 TABLET ORAL DAILY
Status: DISCONTINUED | OUTPATIENT
Start: 2021-08-24 | End: 2021-08-25 | Stop reason: HOSPADM

## 2021-08-24 RX ORDER — ATORVASTATIN CALCIUM 40 MG/1
40 TABLET, FILM COATED ORAL NIGHTLY
Status: DISCONTINUED | OUTPATIENT
Start: 2021-08-24 | End: 2021-08-25 | Stop reason: HOSPADM

## 2021-08-24 RX ORDER — HYDROCODONE BITARTRATE AND ACETAMINOPHEN 5; 325 MG/1; MG/1
1 TABLET ORAL EVERY 4 HOURS PRN
Status: DISCONTINUED | OUTPATIENT
Start: 2021-08-24 | End: 2021-08-25 | Stop reason: HOSPADM

## 2021-08-24 RX ORDER — ONDANSETRON 2 MG/ML
4 INJECTION INTRAMUSCULAR; INTRAVENOUS EVERY 6 HOURS PRN
Status: DISCONTINUED | OUTPATIENT
Start: 2021-08-24 | End: 2021-08-25 | Stop reason: HOSPADM

## 2021-08-24 RX ORDER — SODIUM CHLORIDE 0.9 % (FLUSH) 0.9 %
5-40 SYRINGE (ML) INJECTION EVERY 12 HOURS SCHEDULED
Status: DISCONTINUED | OUTPATIENT
Start: 2021-08-24 | End: 2021-08-25 | Stop reason: HOSPADM

## 2021-08-24 RX ORDER — ASPIRIN 81 MG/1
81 TABLET, CHEWABLE ORAL DAILY
Status: DISCONTINUED | OUTPATIENT
Start: 2021-08-24 | End: 2021-08-25 | Stop reason: HOSPADM

## 2021-08-24 RX ORDER — ONDANSETRON 4 MG/1
4 TABLET, ORALLY DISINTEGRATING ORAL EVERY 8 HOURS PRN
Status: DISCONTINUED | OUTPATIENT
Start: 2021-08-24 | End: 2021-08-25 | Stop reason: HOSPADM

## 2021-08-24 RX ORDER — ALBUTEROL SULFATE 90 UG/1
2 AEROSOL, METERED RESPIRATORY (INHALATION) EVERY 6 HOURS PRN
Status: DISCONTINUED | OUTPATIENT
Start: 2021-08-24 | End: 2021-08-25 | Stop reason: HOSPADM

## 2021-08-24 RX ORDER — SODIUM CHLORIDE 9 MG/ML
25 INJECTION, SOLUTION INTRAVENOUS PRN
Status: DISCONTINUED | OUTPATIENT
Start: 2021-08-24 | End: 2021-08-25 | Stop reason: HOSPADM

## 2021-08-24 RX ORDER — SODIUM CHLORIDE 0.9 % (FLUSH) 0.9 %
5-40 SYRINGE (ML) INJECTION PRN
Status: DISCONTINUED | OUTPATIENT
Start: 2021-08-24 | End: 2021-08-25 | Stop reason: HOSPADM

## 2021-08-24 RX ADMIN — ASPIRIN 81 MG: 81 TABLET, CHEWABLE ORAL at 09:21

## 2021-08-24 RX ADMIN — SODIUM CHLORIDE, PRESERVATIVE FREE 10 ML: 5 INJECTION INTRAVENOUS at 09:21

## 2021-08-24 RX ADMIN — FUROSEMIDE 20 MG: 10 INJECTION, SOLUTION INTRAMUSCULAR; INTRAVENOUS at 00:13

## 2021-08-24 RX ADMIN — FUROSEMIDE 40 MG: 40 TABLET ORAL at 09:21

## 2021-08-24 RX ADMIN — OMEPRAZOLE 10 MG: 10 CAPSULE, DELAYED RELEASE ORAL at 09:21

## 2021-08-24 RX ADMIN — AMLODIPINE BESYLATE 5 MG: 5 TABLET ORAL at 10:38

## 2021-08-24 RX ADMIN — SODIUM CHLORIDE, PRESERVATIVE FREE 10 ML: 5 INJECTION INTRAVENOUS at 22:23

## 2021-08-24 RX ADMIN — OXYCODONE HYDROCHLORIDE AND ACETAMINOPHEN 1000 MG: 500 TABLET ORAL at 09:21

## 2021-08-24 ASSESSMENT — PAIN SCALES - GENERAL
PAINLEVEL_OUTOF10: 0

## 2021-08-24 ASSESSMENT — ENCOUNTER SYMPTOMS
BACK PAIN: 0
TROUBLE SWALLOWING: 0
COUGH: 0
NAUSEA: 0
ABDOMINAL PAIN: 0
ABDOMINAL DISTENTION: 0
RHINORRHEA: 0
SHORTNESS OF BREATH: 1
VOMITING: 0
WHEEZING: 0
SORE THROAT: 0
CHEST TIGHTNESS: 0
DIARRHEA: 0
CONSTIPATION: 0

## 2021-08-24 NOTE — PROGRESS NOTES
Patient running in the 40's on telemetry. In 30's when asleep. Not symptomatic. Resident notified. Will continue to monitor.

## 2021-08-24 NOTE — CONSULTS
Renal Consult Note    Patient :  Shandra Barclay; 67 y.o. MRN# [id number]  Location:  8611/2013-69  Attending:  Robe Eddy MD  Admit Date:  8/23/2021   Hospital Day: 0    Reason for Consult:     Asked by Dr Robe Eddy MD to see for CARIN/Elevated Creatinine. History Obtained From:     Chart review and patient     History of Present Illness:     Shandra Barclay; 67 y.o. male with past medical history of CKD stage III with baseline creatinine recently in range of 1.4-1.8, CAD s/p CABG in 4227, diastolic heart failure, hypertension, bradycardia, arthritis and GERD who was admitted to hospital for chest tightness and worsening shortness of breath. Chest x-ray was negative for any vascular congestion or pleural effusion. Recent stress test negative, recent echo with preserved LVEF. Apparently patient is having bradycardia recently and his Coreg was changed to Lopressor and then ultimately switched to amlodipine. Patient was evaluated by cardiology and they recommended nephrology consult as patient required diuresis. On my evaluation, patient is alert oriented x3. Endorsed the above-mentioned history. States that for the past 1 month he is having shortness of breath with laying flat on the bed which does gets improved on sitting up or standing. Also reported chest tightness and leg swelling. Patient baseline creatinine recently appears to be in the range of 1.4-1.8, recently did peaked to 2.08 in July 2021. Admitting creatinine 1.96 and then down to 1.88 which seems closer to his baseline. proBNP elevated to 6000. No fever, chills, nausea or vomiting, diarrhea or reduction in urine output. He does take opioids for arthritis but no NSAID use. No history of recent contrast exposure, No h/o prolonged NSAIDs use in the past, No h/o nephrolithiasis, No recent skin rashes or arthralgias, No hematuria or pyuria noticed in the recent past. Doesn't report any reduction in the urine output recently.  Non report of any obstructive urinary symptoms (urgency, frequency, weak stream, straining while urination). No h/o recurrent UTIs in the past.    Past History/Allergies? Social History:     Past Medical History:   Diagnosis Date    Arthritis     fentanyl patch    Tracy's esophagus     Brunner's gland adenoma     CAD (coronary artery disease) 03/06/2014    stents 2002   CABG 3/6/14    Cancer ([address]     stomach    CKD (chronic kidney disease)     Colon polyp 11/21/2018    hyperplastic polyp    Dysphagia     Esophagitis     Gastritis     GERD (gastroesophageal reflux disease)     Hx of blood clots     3 clots right leg, left eye    Hyperlipidemia     Hypertension     Lipoma     Myocardial infarction (HCC)     STRESS DONE    Nausea & vomiting     Pyogenic granuloma     Vascular abnormality     CAROTID       Past Surgical History:   Procedure Laterality Date    ARTHROPLASTY      RIGHT KNEE X3-INFECTION    ARTHROPLASTY      LEFT KNEE    BRAIN TUMOR EXCISION  2008    excision    CARDIAC CATHETERIZATION      CAROTID ENDARTERECTOMY      LEFT    CERVICAL DISCECTOMY      CERVICAL SPINE SURGERY  6-25-13    ACF 3-4, 4-5    COLONOSCOPY  11/21/2018    hyperplastic polyp    COLONOSCOPY N/A 11/21/2018    COLONOSCOPY POLYPECTOMY SNARE/COLD BIOPSY performed by Hal Romberg, MD at [place] [address]  3/6/2014    CABG X 3    ENDOSCOPY, COLON, DIAGNOSTIC      INGUINAL HERNIA REPAIR      LEFT    JOINT REPLACEMENT      Bilat knees    LUMBAR DISC SURGERY      HARDWARE    OTHER SURGICAL HISTORY Left 12/12/2017     1) Arch aortography 2) Selective left subclavian angiography 3) Left subclavian artery angioplasty with 7 mm x 40 mm and 8 mm x 40 mm [place] balloons     TONSILLECTOMY      UPPER GASTROINTESTINAL ENDOSCOPY  4/30/2015    UPPER GASTROINTESTINAL ENDOSCOPY  5/18/16    lipomatous lump; esophagitis; pyogenic granuloma; mild gastritis    UPPER GASTROINTESTINAL ENDOSCOPY  01/25/2017 lipoma; prominet Brunner's gland; gastritis    UPPER GASTROINTESTINAL ENDOSCOPY  08/08/2018    CASTILLO'S    UPPER GASTROINTESTINAL ENDOSCOPY  8/8/2018    EGD BIOPSY performed by Dianne Obrien MD at [address]  09/04/2019    EGD BIOPSY     UPPER GASTROINTESTINAL ENDOSCOPY N/A 9/4/2019    EGD BIOPSY performed by Dianne Obrien MD at [address] N/A 5/12/2021    EGD BIOPSY performed by Dianne Obrien MD at [address]   Allergen Reactions    Vancomycin      AFFECTS KIDNEYS         Social History     Socioeconomic History    Marital status:      Spouse name: Not on file    Number of children: Not on file    Years of education: Not on file    Highest education level: Not on file   Occupational History    Not on file   Tobacco Use    Smoking status: Former Smoker     Packs/day: 0.25     Years: 30.00     Pack years: 7.50     Types: Cigarettes     Quit date: 5/1/2018     Years since quitting: 3.3    Smokeless tobacco: Former User    Tobacco comment: QUIT CIGARETTES/SMOKES CIGARS   Vaping Use    Vaping Use: Never used   Substance and Sexual Activity    Alcohol use: No     Comment: QUIT; H/O ETOH abuse    Drug use: No    Sexual activity: Not on file   Other Topics Concern    Not on file   Social History Narrative    Not on file     Social Determinants of Health     Financial Resource Strain:     Difficulty of Paying Living Expenses:    Food Insecurity:     Worried About Running Out of Food in the Last Year:     [address] in the Last Year:    Transportation Needs:     Lack of Transportation (Medical):      Lack of Transportation (Non-Medical):    Physical Activity:     Days of Exercise per Week:     Minutes of Exercise per Session:    Stress:     Feeling of Stress :    Social Connections:     Frequency of Communication with Friends and Family:     Frequency of Social Gatherings with Friends and Family:     Attends Mandaen Services:     Active Member of Clubs or Organizations:     Attends Club or Organization Meetings:     Marital Status:    Intimate Partner Violence:     Fear of Current or Ex-Partner:     Emotionally Abused:     Physically Abused:     Sexually Abused:        Family History:        Family History   Problem Relation Age of Onset    Lung Cancer Father     Stroke Maternal Grandmother        Outpatient Medications:     Medications Prior to Admission: ondansetron (ZOFRAN-ODT) 4 MG disintegrating tablet, DISSOLVE 1 TABLET ON THE TONGUE EVERY 8 HOURS AS NEEDED FOR NAUSEA OR VOMITING  furosemide (LASIX) 40 MG tablet, Take 1 tablet by mouth daily (Patient taking differently: Take 40 mg by mouth daily Pt takes 1/2 tab daily)  atorvastatin (LIPITOR) 40 MG tablet, Take 1 tablet by mouth nightly  omeprazole (PRILOSEC) 20 MG delayed release capsule, TAKE ONE CAPSULE BY MOUTH DAILY  nitroGLYCERIN (NITROSTAT) 0.4 MG SL tablet, up to max of 3 total doses. If no relief after 1 dose, call 911. metoprolol tartrate (LOPRESSOR) 25 MG tablet, Take 0.25 tablets by mouth 2 times daily (Patient taking differently: Take 12.5 mg by mouth 2 times daily Takes half a pill = 12.5 mg twice daily)  albuterol (PROVENTIL HFA;VENTOLIN HFA) 108 (90 BASE) MCG/ACT inhaler, Inhale 2 puffs into the lungs every 6 hours as needed for Wheezing. aspirin 81 MG chewable tablet, Take 1 tablet by mouth daily. HYDROcodone-acetaminophen (NORCO)  MG per tablet, Take 1 tablet by mouth every 4 hours as needed for Pain . Ascorbic Acid (VITAMIN C) 1000 MG tablet, Take 1,000 mg by mouth daily  ALPRAZolam (XANAX PO), Take 0.5 tablets by mouth 2 times daily as needed   fentaNYL (DURAGESIC) 50 MCG/HR, Place 1 patch onto the skin every other day.      Current Medications:     Scheduled Meds:    vitamin C  1,000 mg Oral Daily    aspirin  81 mg Oral Daily    atorvastatin  40 mg Oral Nightly    furosemide  40 mg Oral Daily    omeprazole  10 mg Oral QAM    sodium chloride flush  5-40 mL Intravenous 2 times per day    enoxaparin  30 mg Subcutaneous Daily    amLODIPine  5 mg Oral Daily     Continuous Infusions:    sodium chloride       PRN Meds:  albuterol sulfate HFA, ALPRAZolam, HYDROcodone-acetaminophen, sodium chloride flush, sodium chloride, acetaminophen, ondansetron **OR** ondansetron    Review of Systems:     Constitutional: No fever, no chills, no lethargy, no weakness. HEENT:  No headache, otalgia, itchy eyes, nasal discharge or sore throat. Cardiac:  Chest tightness, orthopnea  Chest:   No cough, phlegm or wheezing. Abdomen:  No abdominal pain, nausea or vomiting. Neuro:  No focal weakness, abnormal movements or seizure like activity. Skin:   No rashes, no itching. :   No hematuria, no pyuria, no dysuria, no flank pain. Extremities:  Leg swelling  ROS was otherwise negative except as mentioned in the [place]. Input/Output:       No intake/output data recorded. Vital Signs:   Temperature:  Temp: 97.2 °F (36.2 °C)  TMax:   Temp (24hrs), Av.7 °F (36.5 °C), Min:97.2 °F (36.2 °C), Max:98.4 °F (36.9 °C)    Respirations:  Resp: 18  Pulse:   Pulse: (!) 47  BP:    BP: (!) 129/56  BP Range: Systolic (24SEU), KKK:210 , Min:129 , AOQ:479       Diastolic (66FYS), SJM:01, Min:51, Max:90      Physical Examination:     General:  AAO x 3, speaking in full sentences, no accessory muscle use. HEENT: Atraumatic, normocephalic, no throat congestion, moist mucosa. Eyes:   Pupils equal, round and reactive to light, EOMI. Neck:   Supple  Chest:   Bilateral vesicular breath sounds, no rales or wheezes. Cardiac:  S1 S2 RR, no murmurs, gallops or rubs. Abdomen: Soft, non-tender, no masses or organomegaly, BS audible. :   No suprapubic or flank tenderness. Neuro:  AAO x 3, No FND. SKIN:  No rashes, good skin turgor. Extremities:  No edema.     Labs:       Recent Labs     21  2030   WBC 8.5   RBC 3.69*   HGB 11.7*   HCT 35.9*   MCV 97.3 MCH 31.7   MCHC 32.6   RDW 15.2*      MPV 10.8      BMP:   Recent Labs     08/23/21  2030 08/24/21  0913    140   K 4.7 4.4    103   CO2 26 27   BUN 27* 27*   CREATININE 1.96* 1.88*   GLUCOSE 126* 93   CALCIUM 9.5 9.4      Magnesium:    Recent Labs     08/24/21  0913   MG 2.3     SPEP:  Lab Results   Component Value Date    PROT 6.8 07/09/2021    ALBCAL 3.9 04/06/2018    ALBPCT 68 04/06/2018    LABALPH 0.1 04/06/2018    LABALPH 0.6 04/06/2018    A1PCT 2 04/06/2018    A2PCT 9 04/06/2018    LABBETA 0.6 04/06/2018    BETAPCT 10 04/06/2018    GAMGLOB 0.6 04/06/2018    GGPCT 11 04/06/2018    PATH ELECTRONICALLY SIGNED. Luis Miguel Suggs M.D. 04/06/2018     Hep BsAg:         Lab Results   Component Value Date    HEPBSAG NONREACTIVE 08/04/2015     Hep C AB:          Lab Results   Component Value Date    HEPCAB NONREACTIVE 08/04/2015       Urinalysis/Chemistries:      Lab Results   Component Value Date    NITRU NEGATIVE 04/13/2014    COLORU YELLOW 04/13/2014    PHUR 6.0 04/13/2014    SPECGRAV 1.016 04/13/2014    LEUKOCYTESUR NEGATIVE 04/13/2014    UROBILINOGEN Normal 04/13/2014    BILIRUBINUR NEGATIVE 04/13/2014    GLUCOSEU NEGATIVE 04/13/2014    KETUA NEGATIVE 04/13/2014         Assessment:     1. CKD stage III likely related to nephrosclerosis with baseline creatinine 1.4-1.8. He CKD goes back to 2014. Some component of acute kidney injury, may be secondary to decompensated heart failure. Though creatinine did improve and now appears to be close to baseline. 2. Orthopnea related to diastolic heart failure  3. Bradycardia  4. Hypertension  5. CAD s/p CABG in 2014  6. Arthritis  7. GERD    Plan:   1. Agree with diuresis. Suggest to change lasix to 40 mg alternating with 80 mg daily. .  2. Will look into starting him on ace/arb as outpt . 3. Renal us, basic labs. Ordered. 4. Ok for disc from renal standpoint. Nutrition   Please ensure that patient is on a renal diet/TF.  Avoid nephrotoxic drugs/contrast exposure. Thank you for the consultation. Please do not hesitate to contact us for any further questions/concerns. We will continue to follow along with you. Anay Morgan MD  Internal Medicine Resident, PGY-2  [facility]; [place], [place]  8/24/2021,11:03 AM    Attending Physician Statement  I have discussed the care of [facility], including pertinent history and exam findings with the resident/fellow. I have reviewed the key elements of all parts of the encounter with the resident/fellow. I have seen and examined the patient with the resident/fellow. I agree with the assessment and plan and status of the problem list as documented.       Ashley Salinas MD , MD

## 2021-08-24 NOTE — ED PROVIDER NOTES
[facility] ED  Emergency Department Encounter  Emergency Medicine Resident     Pt Name: Charles Simon  MRN: [id number]  Félixgfchuyita 1945  Date of evaluation: 8/23/21  PCP:  Elizabeth Wall       Chief Complaint   Patient presents with    Chest Pain    Shortness of Breath       HISTORY OFPRESENT ILLNESS  (Location/Symptom, Timing/Onset, Context/Setting, Quality, Duration, Modifying Elmon .)      Charles Simon is a 67 y.o. male who presents with concerns for worsening shortness of breath of approximately 1 month duration as well as worsening bilateral lower extremity swelling during that same period of time. Patient presents today with concerns for headache as well as concerns for feeling of discomfort, shortness of breath. Patient does have a previous history of myocardial infarction, hyperlipidemia, hypertension placing him at high risk for an acute coronary event. 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 Patient denies trauma but states as though he feels as though his head is \"explode\". Upon initial evaluation patient is ambulating without difficulty, is notably bradycardic but has adequate blood pressure, but this pain at 4 out of 10 intensity, states that is more of a description of a feeling of discomfort but no overt pain. Patient states that he will sometimes feel nauseous without vomiting as an associated symptom. PAST MEDICAL / SURGICAL / SOCIAL / FAMILY HISTORY      has a past medical history of Arthritis, Tracy's esophagus, Brunner's gland adenoma, CAD (coronary artery disease), Cancer ([address] CKD (chronic kidney disease), Colon polyp, Dysphagia, Esophagitis, Gastritis, GERD (gastroesophageal reflux disease), Hx of blood clots, Hyperlipidemia, Hypertension, Lipoma, Myocardial infarction ([address] Nausea & vomiting, Pyogenic granuloma, and Vascular abnormality.      has a past surgical history that includes Cardiac catheterization; Carotid endarterectomy; Cervical discectomy; Lumbar disc surgery; arthroplasty; arthroplasty; Tonsillectomy; Inguinal hernia repair; Brain tumor excision (2008); Cervical spine surgery (6-25-13); Coronary artery bypass graft (3/6/2014); Upper gastrointestinal endoscopy (4/30/2015); Upper gastrointestinal endoscopy (5/18/16); Upper gastrointestinal endoscopy (01/25/2017); joint replacement; other surgical history (Left, 12/12/2017); Upper gastrointestinal endoscopy (08/08/2018); Upper gastrointestinal endoscopy (8/8/2018); Colonoscopy (11/21/2018); Colonoscopy (N/A, 11/21/2018); Endoscopy, colon, diagnostic; Upper gastrointestinal endoscopy (09/04/2019); Upper gastrointestinal endoscopy (N/A, 9/4/2019); and Upper gastrointestinal endoscopy (N/A, 5/12/2021). Social History     Socioeconomic History    Marital status:      Spouse name: Not on file    Number of children: Not on file    Years of education: Not on file    Highest education level: Not on file   Occupational History    Not on file   Tobacco Use    Smoking status: Former Smoker     Packs/day: 0.25     Years: 30.00     Pack years: 7.50     Types: Cigarettes     Quit date: 5/1/2018     Years since quitting: 3.3    Smokeless tobacco: Former User    Tobacco comment: QUIT CIGARETTES/SMOKES CIGARS   Vaping Use    Vaping Use: Never used   Substance and Sexual Activity    Alcohol use: No     Comment: QUIT; H/O ETOH abuse    Drug use: No    Sexual activity: Not on file   Other Topics Concern    Not on file   Social History Narrative    Not on file     Social Determinants of Health     Financial Resource Strain:     Difficulty of Paying Living Expenses:    Food Insecurity:     Worried About [address] in the Last Year:     [address] [employer] in the Last Year:    Transportation Needs:     Lack of Transportation (Medical):      Lack of Transportation (Non-Medical):    Physical Activity:     Days of Exercise per Week:  Minutes of Exercise per Session:    Stress:     Feeling of Stress :    Social Connections:     Frequency of Communication with Friends and Family:     Frequency of Social Gatherings with Friends and Family:     Attends [employer]oration[employer] Services:     Active Member of Clubs or Organizations:     Attends Club or Organization Meetings:     Marital Status:    Intimate Partner Violence:     Fear of Current or Ex-Partner:     Emotionally Abused:     Physically Abused:     Sexually Abused:        Family History   Problem Relation Age of Onset    Lung Cancer Father     Stroke Maternal Grandmother        Allergies:  Vancomycin    Home Medications:  Prior to Admission medications    Medication Sig Start Date End Date Taking? Authorizing Provider   ondansetron (ZOFRAN-ODT) 4 MG disintegrating tablet DISSOLVE 1 TABLET ON THE TONGUE EVERY 8 HOURS AS NEEDED FOR NAUSEA OR VOMITING 5/10/21   Bren gArawal MD   furosemide (LASIX) 40 MG tablet Take 1 tablet by mouth daily  Patient taking differently: Take 40 mg by mouth daily Pt takes 1/2 tab daily 2/26/21   Alice Espinosa MD   atorvastatin (LIPITOR) 40 MG tablet Take 1 tablet by mouth nightly 2/26/21   Alice Espinosa MD   omeprazole (PRILOSEC) 20 MG delayed release capsule TAKE ONE CAPSULE BY MOUTH DAILY 12/1/20   Luanna Hamman, MD   nitroGLYCERIN (NITROSTAT) 0.4 MG SL tablet up to max of 3 total doses. If no relief after 1 dose, call 911. Patient not taking: Reported on 7/16/2021 12/3/18   Daisy Damico MD   metoprolol tartrate (LOPRESSOR) 25 MG tablet Take 0.25 tablets by mouth 2 times daily  Patient taking differently: Take 12.5 mg by mouth 2 times daily Takes half a pill = 12.5 mg twice daily 4/6/18   Eulalia Mahan MD   HYDROcodone-acetaminophen [facility])  MG per tablet Take 1 tablet by mouth every 4 hours as needed for Pain .     Historical Provider, MD   Ascorbic Acid (VITAMIN C) 1000 MG tablet Take 1,000 mg by mouth daily    Historical Provider, MD   ALPRAZolam (XANAX PO) Take 0.5 tablets by mouth 2 times daily as needed     Historical Provider, MD   albuterol (PROVENTIL HFA;VENTOLIN HFA) 108 (90 BASE) MCG/ACT inhaler Inhale 2 puffs into the lungs every 6 hours as needed for Wheezing. Historical Provider, MD   fentaNYL (DURAGESIC) 50 MCG/HR Place 1 patch onto the skin every other day. Historical Provider, MD   aspirin 81 MG chewable tablet Take 1 tablet by mouth daily. 3/11/14   Rojelio Bradley MD       REVIEW OFSYSTEMS    (2-9 systems for level 4, 10 or more for level 5)      Review of Systems   Constitutional: Negative for chills, diaphoresis, fatigue and fever. HENT: Negative for rhinorrhea, sore throat, tinnitus and trouble swallowing. Eyes: Negative for visual disturbance. Respiratory: Positive for shortness of breath. Negative for cough, chest tightness and wheezing. Cardiovascular: Positive for chest pain and leg swelling. Gastrointestinal: Negative for abdominal distention, abdominal pain, constipation, diarrhea, nausea and vomiting. Endocrine: Negative for polyuria. Genitourinary: Negative for dysuria, flank pain and frequency. Musculoskeletal: Negative for arthralgias, back pain, joint swelling and myalgias. Neurological: Positive for headaches. Negative for dizziness, tremors, seizures, weakness, light-headedness and numbness. PHYSICAL EXAM   (up to 7 for level 4, 8 or more forlevel 5)      INITIAL VITALS:   ED Triage Vitals [08/23/21 2000]   BP Temp Temp src Pulse Resp SpO2 Height Weight   (!) 152/58 98.4 °F (36.9 °C) -- (!) 48 18 97 % 5' 5\" (1.651 m) 143 lb (64.9 kg)       Physical Exam  Constitutional:       General: He is not in acute distress. Appearance: He is not ill-appearing. HENT:      Head: Normocephalic and atraumatic. Right Ear: External ear normal.      Left Ear: External ear normal.   Eyes:      Extraocular Movements: Extraocular movements intact.    Cardiovascular:      Rate and Rhythm: Regular rhythm. Bradycardia present. Pulmonary:      Effort: Pulmonary effort is normal.   Abdominal:      General: Abdomen is flat. Musculoskeletal:         General: No deformity or signs of injury. Right lower leg: Tenderness present. Edema (+1) present. Left lower leg: Edema (Possible) present. Skin:     General: Skin is warm. Capillary Refill: Capillary refill takes less than 2 seconds. Neurological:      General: No focal deficit present. Mental Status: He is oriented to person, place, and time. Mental status is at baseline. Cranial Nerves: No cranial nerve deficit. Motor: No weakness.       Comments: 5 5 muscle strength bilateral upper and lower extremity, cranial nerves II through XII grossly intact, patient oriented x3   Psychiatric:         Mood and Affect: Mood normal.         DIFFERENTIAL  DIAGNOSIS     PLAN (LABS / Elisabet Anaya / EKG):  Orders Placed This Encounter   Procedures    COVID-19, Rapid    CT HEAD WO CONTRAST    XR CHEST (2 VW)    CBC WITH AUTO DIFFERENTIAL    Troponin    Brain Natriuretic Peptide    D-DIMER, QUANTITATIVE    Basic Metabolic Panel    Troponin    EKG 12 Lead    PATIENT STATUS (FROM ED OR OR/PROCEDURAL) Observation       MEDICATIONS ORDERED:  Orders Placed This Encounter   Medications    aspirin chewable tablet 324 mg    prochlorperazine (COMPAZINE) injection 10 mg    diphenhydrAMINE (BENADRYL) injection 25 mg    furosemide (LASIX) injection 20 mg       DDX: Congestive heart failure, fluid overload, angina, myocardial event, brain bleed, CHF, pulmonary embolism    Initial MDM/Plan/ED COURSE:    67 y.o. male who presents with concerns for headache in the setting of known tumor as well as tenderness to the right calf with palpation, patient has had worsening exertional dyspnea, orthopnea upon reevaluation, also endorses bilateral lower extremity swelling, plan to get serial troponins are assess for myocardial event, plan to get a proBNP and reassess for worsening congestive heart failure, a D-dimer as patient is right calf tenderness or assess for concerns for potential clotting, pulmonary embolism. Patient is currently on a beta-blocker, is currently bradycardic but seems to be consistent with previous evaluation. ED Course as of Aug 24 0059   Mon Aug 23, 2021   2117 Negative age-adjusted D-dimer   D-Dimer, Quant: 0.65 [GP]   2146 Troponin, High Sensitivity(!): 26 [GP]   2146 Pro-BNP(!): 6,271 [GP]   2340 Troponin, High Sensitivity: 20 [GP]      ED Course User Index  [GP] Leatha Naylor MD   Serial troponins downtrending, patient does have right calf tenderness, plan admit patient observation unit for reevaluation by cardiology in the setting of known risk factors, worsening shortness of breath and bilateral lower extremity swelling over the past 4 weeks as well as to get a right lower quadrant ultrasound to assess for DVT. Patient resting comfortably at this time, CT head negative for acute pathology.   Patient admitted the observation unit for evaluation by cardiology and additional imaging.:     DIAGNOSTIC RESULTS / [facility] COURSE / MDM     LABS:  Labs Reviewed   CBC WITH AUTO DIFFERENTIAL - Abnormal; Notable for the following components:       Result Value    RBC 3.69 (*)     Hemoglobin 11.7 (*)     Hematocrit 35.9 (*)     RDW 15.2 (*)     All other components within normal limits   TROPONIN - Abnormal; Notable for the following components:    Troponin, High Sensitivity 26 (*)     All other components within normal limits   BRAIN NATRIURETIC PEPTIDE - Abnormal; Notable for the following components:    Pro-BNP 6,271 (*)     All other components within normal limits   BASIC METABOLIC PANEL - Abnormal; Notable for the following components:    Glucose 126 (*)     BUN 27 (*)     CREATININE 1.96 (*)     GFR Non- 33 (*)     GFR  40 (*)     All other components within normal limits COVID-19, RAPID   D-DIMER, QUANTITATIVE   TROPONIN           XR CHEST (2 VW)    Result Date: 8/23/2021  EXAMINATION: TWO XRAY VIEWS OF THE CHEST 8/23/2021 9:16 pm COMPARISON: 02/25/2021 HISTORY: ORDERING SYSTEM PROVIDED HISTORY: concern for sob TECHNOLOGIST PROVIDED HISTORY: concern for sob Chest pain and shortness of breath with bradycardia FINDINGS: No focal consolidation, pleural effusion or pneumothorax. The cardiac silhouette and mediastinal contours are stable. No acute bony abnormality. No acute process. CT HEAD WO CONTRAST    Result Date: 8/23/2021  EXAMINATION: CT OF THE HEAD WITHOUT CONTRAST  8/23/2021 10:58 pm TECHNIQUE: CT of the head was performed without the administration of intravenous contrast. Dose modulation, iterative reconstruction, and/or weight based adjustment of the mA/kV was utilized to reduce the radiation dose to as low as reasonably achievable. COMPARISON: CT head 04/05/2018 HISTORY: ORDERING SYSTEM PROVIDED HISTORY: headache TECHNOLOGIST PROVIDED HISTORY: headache Decision Support Exception - unselect if not a suspected or confirmed emergency medical condition->Emergency Medical Condition (MA) Reason for Exam: Headache Acuity: Unknown Type of Exam: Unknown FINDINGS: BRAIN/VENTRICLES: There is no acute intracranial hemorrhage, mass effect or midline shift. No abnormal extra-axial fluid collection. The gray-white differentiation is maintained. Mild chronic microvascular ischemic changes are seen in the white matter. There is prominence of the cortical sulci and ventricles, from global parenchymal volume loss. There is no evidence of hydrocephalus. ORBITS: The visualized portion of the orbits demonstrate no acute abnormality. SINUSES: The visualized paranasal sinuses and mastoid air cells demonstrate no acute abnormality. SOFT TISSUES/SKULL: Redemonstration postsurgical changes of right frontal craniectomy.   There is irregularity with lucency/erosion of the underlying and adjacent calvarium. This is not significantly changed since the prior study. No acute abnormality of the visualized soft tissues. No acute intracranial abnormality. Postsurgical changes in the right frontal bone with stable osseous irregularity and lucency of the underlying and adjacent right calvarium. EKG  Normal sinus, bradycardic rate, normal axis, no ST elevation, no T wave changes, no pathologic Q waves, no acute ischemic changes    All EKG's are interpreted by the Emergency Department Physicianwho either signs or Co-signs this chart in the absence of a cardiologist.      PROCEDURES:  None    CONSULTS:  IP CONSULT TO CARDIOLOGY    CRITICAL CARE:  Please see attending note    FINAL IMPRESSION      1. Chest pain, unspecified type    2. Orthopnea    3. Leg swelling          DISPOSITION / PLAN     DISPOSITION Admitted 08/23/2021 11:48:36 PM      PATIENT REFERRED TO:  No follow-up provider specified.     DISCHARGE MEDICATIONS:  New Prescriptions    No medications on file       Kanwal Ashby MD  Emergency Medicine Resident    (Please note that portions of this note were completed with a voice recognition program.Efforts were made to edit the dictations but occasionally words are mis-transcribed.)        Kanwal Ashby MD  Resident  08/24/21 [id number]

## 2021-08-24 NOTE — ED PROVIDER NOTES
[facility]  Emergency Department  Faculty Attestation     I performed a history and physical examination of the patient and discussed management with the resident. I reviewed the residents note and agree with the documented findings and plan of care. Any areas of disagreement are noted on the chart. I was personally present for the key portions of any procedures. I have documented in the chart those procedures where I was not present during the key portions. I have reviewed the emergency nurses triage note. I agree with the chief complaint, past medical history, past surgical history, allergies, medications, social and family history as documented unless otherwise noted below. For Physician Assistant/ Nurse Practitioner cases/documentation I have personally evaluated this patient and have completed at least one if not all key elements of the E/M (history, physical exam, and MDM). Additional findings are as noted. Primary Care Physician:  George Rollins    Screenings:  [unfilled]    CHIEF COMPLAINT       Chief Complaint   Patient presents with    Chest Pain    Shortness of Breath       RECENT VITALS:   Temp: 98.4 °F (36.9 °C),  Pulse: (!) 48, Resp: 18, BP: (!) 152/58    LABS:  Labs Reviewed - No data to display    Radiology  No orders to display       CRITICAL CARE: There was a high probability of clinically significant/life threatening deterioration in this patient's condition which required my urgent intervention. Total critical care time was none minutes. This excludes any time for separately reportable procedures.      EKG:   EKG Interpretation    Interpreted by me    Rhythm: normal sinus   Rate: Bradycardia  Axis: normal  Ectopy: none  Conduction: normal  ST Segments: no acute change  T Waves: no acute change  Q Waves: none    Clinical Impression: Bradycardia, U wave noted, no acute ischemic changes    Attending Physician Additional  Notes    Patient arrived in triage with complaint of chest heaviness and difficulty breathing. When he is back in the room now he states he feels as though his head is going to explode and is nauseated. No strokelike symptoms. No stiff neck. He does not use aerosols. History of cardiac syndrome, on aspirin. On exam he is walking straight, GCS 15, bradycardic, minimally hypertensive, afebrile. Neck is supple. Normal speech and mentation. Normal pupils. Normal extraocular movements. Cranial nerves intact. Scalp nontender. Negative drift. Lungs are diminished but symmetrical.  Minimal JVD. Impression is chest pain/dyspnea rule out ACS versus COPD exacerbation, severe headache. Plan is migraine cocktail, CT brain, laboratory studies, chest x-ray, aerosols, BNP, troponins, reassess, anticipate admission. Marry Dhaliwal.  Duyen Sánchez MD, [facility]  Attending Emergency  Physician                Daniel Pandey MD  08/23/21 2012

## 2021-08-24 NOTE — PROGRESS NOTES
OBS/CDU   RESIDENT NOTE      Patients PCP is:  Keyana Walker        SUBJECTIVE      No acute events overnight. Patient admits to some shortness of breath but states this is improving. Denies any chest pain at this time. Admits to some leg swelling. Patient states that he is concerned about his wife who is sick at home. Denies any fever, chills, nausea, vomiting, diarrhea. PHYSICAL EXAM      General: NAD, AO X 3, elderly male who is easily distracted  Heent: EMOI, PERRL  Neck: SUPPLE, NO JVD, trachea midline  Cardiovascular: RRR, S1 S2, S3 gallop, bradycardia  Pulmonary: CTAB, NO SOB, no wheezes rales or rhonchi  Abdomen: SOFT, NTTP, ND, +BS  Extremities: 1+ pitting edema lower extremities  Neuro / Psych: No focal neurologic deficits, patient appears anxious and easily distracted. Poor eye contact. Continually pacing around room. PERTINENT TEST /EXAMS      I have reviewed all available laboratory results. MEDICATIONS CURRENT   albuterol sulfate  (90 Base) MCG/ACT inhaler 2 puff, Q6H PRN  ALPRAZolam (XANAX) tablet 0.5 mg, BID PRN  ascorbic acid (VITAMIN C) tablet 1,000 mg, Daily  aspirin chewable tablet 81 mg, Daily  atorvastatin (LIPITOR) tablet 40 mg, Nightly  furosemide (LASIX) tablet 40 mg, Daily  HYDROcodone-acetaminophen (NORCO) 5-325 MG per tablet 1 tablet, Q4H PRN  omeprazole (PRILOSEC) 10 MG delayed release capsule 10 mg, QAM  sodium chloride flush 0.9 % injection 5-40 mL, 2 times per day  sodium chloride flush 0.9 % injection 5-40 mL, PRN  0.9 % sodium chloride infusion, PRN  enoxaparin (LOVENOX) injection 30 mg, Daily  acetaminophen (TYLENOL) tablet 650 mg, Q4H PRN  ondansetron (ZOFRAN-ODT) disintegrating tablet 4 mg, Q8H PRN   Or  ondansetron (ZOFRAN) injection 4 mg, Q6H PRN  amLODIPine (NORVASC) tablet 5 mg, Daily        All medication charted and reviewed. CONSULTS      IP CONSULT TO CARDIOLOGY  IP CONSULT TO NEPHROLOGY    ASSESSMENT/PLAN       Orlando Mace is a 67 y.o. male who presents with complaint of shortness of breath as well as leg swelling. Patient has a history of CAD, CKD, hyperlipidemia, hypertension as well as myocardial infarction. His work-up in the emergency department showed an elevated BNP as well as an elevated troponin. He was admitted the observation unit for cardiology consultation given possible CHF. CHF  ? Patient with elevated BNP. BNP has been elevated previously to these levels but this is slightly higher than his average. ? Obtain records from [address] [facility].  ? Cardiology is recommending cessation of his beta-blocker due to his bradycardia  ? He will be placed on amlodipine instead  ? Cardiologist not feel stress and echo are necessary given recent test at [facility]    Headache, acute onset, chronic history. · History of prior surgical resection of frontal lobe. Patient with CT scan performed. Negative study. Will treat symptomatically. Renal insufficiency  ? Likely contributory to CHF. Patient for nephrology evaluation. · Nephrology recommending outpatient follow-up  · Would like us to order renal ultrasound, bladder scan as well as serum protein electrophoresis  · Appreciate recommendations  Lower extremity edema  ? Negative age-adjusted D-dimer. Dopplers of lower extremities being ordered. ? Doubt DVT PE given D-dimer and clinical picture much more consistent with CHF. Will rule out thromboembolic disease in anticipation of treatment for CHF. Miscellaneous  · Continue home medications and pain control  · Monitor vitals, labs, and imaging  · DISPO: pending consults and clinical improvement      --  Morna Blizzard, DO  Emergency Medicine Resident Physician     This dictation was generated by voice recognition computer software. Although all attempts are made to edit the dictation for accuracy, there may be errors in the transcription that are not intended.

## 2021-08-24 NOTE — H&P
[facility] [employer]  CDU / OBSERVATION ENCOUNTER  ATTENDING NOTE     Pt Name: Corona Bain  MRN: [id number]  [place] 1945  Date of evaluation: 8/24/21  Patient's PCP is :  Carlos Rodriguez    CHIEF COMPLAINT       Chief Complaint   Patient presents with    Chest Pain    Shortness of Breath         HISTORY OF PRESENT ILLNESS    Corona Bain is a 67 y.o. male who presents with worsening shortness of breath. Patient has a month long duration of dyspnea and bilateral lower extremity swelling. Patient has little bit of headache. Patient with prior history of MI, hyperlipidemia, hypertension. Patient with history of tumor resection right frontal lobe. Patient denies trauma though he states his headache is significant. Patient has initial bradycardia. Patient has adequate blood pressure. Patient is 4 out of 10 with pain intensity. Patient describes more discomfort than overt pain. Patient does sometimes have nausea without vomiting as an associated symptom. Location/Symptom: Increasing dyspnea and lower extremity swelling  Timing/Onset: Last several weeks prior to presentation  Provocation: Uncertain  Quality: Dyspnea with lower extremity edema  Radiation: None  Severity: Moderate  Timing/Duration: Days to weeks  Modifying Factors: Patient with increasing dyspnea in face of renal insufficiency. Uncertain modifying factors. REVIEW OF SYSTEMS        General ROS - No fevers, No malaise   Ophthalmic ROS - No discharge, No changes in vision  ENT ROS -  No sore throat, No rhinorrhea,   Respiratory ROS - no shortness of breath, no cough, no  wheezing  Cardiovascular ROS -exertional dyspnea patient with lower extremity edema. Bilateral.  Patient with chest discomfort.   Gastrointestinal ROS - No abdominal pain, no nausea or vomiting, no change in bowel habits, no black or bloody stools  Genito-Urinary ROS - No dysuria, trouble voiding, or hematuria  Musculoskeletal ROS - No myalgias, No arthalgias  Neurological ROS - headache, no dizziness/lightheadedness, No focal weakness, no loss of sensation  Dermatological ROS - No lesions, No rash     (PQRS) Advance directives on face sheet per hospital policy. No change unless specifically mentioned in chart    [address]    has a past medical history of Arthritis, Tracy's esophagus, Brunner's gland adenoma, CAD (coronary artery disease), Cancer ([facility] [place] [address] CKD (chronic kidney disease), Colon polyp, Dysphagia, Esophagitis, Gastritis, GERD (gastroesophageal reflux disease), Hx of blood clots, Hyperlipidemia, Hypertension, Lipoma, Myocardial infarction ([facility] [address] Nausea & vomiting, Pyogenic granuloma, and Vascular abnormality. I have reviewed the past medical history with the patient and it is  pertinent to this complaint. SURGICAL HISTORY      has a past surgical history that includes Cardiac catheterization; Carotid endarterectomy; Cervical discectomy; Lumbar disc surgery; arthroplasty; arthroplasty; Tonsillectomy; Inguinal hernia repair; Brain tumor excision (2008); Cervical spine surgery (6-25-13); Coronary artery bypass graft (3/6/2014); Upper gastrointestinal endoscopy (4/30/2015); Upper gastrointestinal endoscopy (5/18/16); Upper gastrointestinal endoscopy (01/25/2017); joint replacement; other surgical history (Left, 12/12/2017); Upper gastrointestinal endoscopy (08/08/2018); Upper gastrointestinal endoscopy (8/8/2018); Colonoscopy (11/21/2018); Colonoscopy (N/A, 11/21/2018); Endoscopy, colon, diagnostic; Upper gastrointestinal endoscopy (09/04/2019); Upper gastrointestinal endoscopy (N/A, 9/4/2019); and Upper gastrointestinal endoscopy (N/A, 5/12/2021). I have reviewed and agree with Surgical History entered and it is  pertinent to this complaint.      CURRENT MEDICATIONS     albuterol sulfate  (90 Base) MCG/ACT inhaler 2 puff, Q6H PRN  ALPRAZolam (XANAX) tablet 0.5 mg, BID PRN  ascorbic acid (VITAMIN C) tablet 1,000 mg, Daily  aspirin chewable tablet 81 mg, Daily  atorvastatin (LIPITOR) tablet 40 mg, Nightly  furosemide (LASIX) tablet 40 mg, Daily  HYDROcodone-acetaminophen (NORCO) 5-325 MG per tablet 1 tablet, Q4H PRN  metoprolol tartrate (LOPRESSOR) tablet 6.25 mg, BID  omeprazole (PRILOSEC) 10 MG delayed release capsule 10 mg, QAM  sodium chloride flush 0.9 % injection 5-40 mL, 2 times per day  sodium chloride flush 0.9 % injection 5-40 mL, PRN  0.9 % sodium chloride infusion, PRN  enoxaparin (LOVENOX) injection 30 mg, Daily  acetaminophen (TYLENOL) tablet 650 mg, Q4H PRN  ondansetron (ZOFRAN-ODT) disintegrating tablet 4 mg, Q8H PRN   Or  ondansetron (ZOFRAN) injection 4 mg, Q6H PRN        All medication charted and reviewed. ALLERGIES     is allergic to vancomycin. FAMILY HISTORY     He indicated that his mother is . He indicated that his father is . He indicated that his maternal grandmother is . family history includes Lung Cancer in his father; Stroke in his maternal grandmother. The patient denies any pertinent family history. I have reviewed and agree with the family history entered. I have reviewed the Family History and it is not significant to the case    SOCIAL HISTORY      reports that he quit smoking about 3 years ago. His smoking use included cigarettes. He has a 7.50 pack-year smoking history. He has quit using smokeless tobacco. He reports that he does not drink alcohol and does not use drugs. I have reviewed and agree with all Social.  There are no  concerns for substance abuse/use. PHYSICAL EXAM     INITIAL VITALS:  height is 5' 5\" (1.651 m) and weight is 143 lb (64.9 kg). His oral temperature is 97.2 °F (36.2 °C). His blood pressure is 129/56 (abnormal) and his pulse is 47 (abnormal). His respiration is 18 and oxygen saturation is 99%.       CONSTITUTIONAL: AOx4, no apparent distress, appears stated age      HEAD: normocephalic, atraumatic   EYES: PERRLA, EOMI    ENT: moist mucous membranes, uvula midline   NECK: supple, symmetric   BACK: symmetric   LUNGS: clear to auscultation bilaterally   CARDIOVASCULAR: regular rate and rhythm, no murmurs, rubs or gallops   ABDOMEN: soft, non-tender, non-distended with normal active bowel sounds   NEUROLOGIC:  MAEx4, no focal sensory or motor deficits   MUSCULOSKELETAL: no clubbing, cyanosis or edema   SKIN: no rash or wounds       DIFFERENTIAL DIAGNOSIS/MDM:     Patient with complaints of shortness of breath and bilateral lower extremity edema. Labs show worsening renal function and elevated BNP. Age-adjusted D-dimer negative. Patient with Doppler of lower extremities ordered. Patient admitted for ongoing evaluation of probable CHF. Cardiology has been consulted. Appreciate input. Renal insufficiency. CT head shows no acute change. Treated symptomatically. Patient for cardiology nephrology evaluation. Anticipate diuresis. Old records called for from [facility],[address].    EKG shows marked bradycardia with rate in low 40s. Normal rhythm. Normal axis. No evidence of ischemia.     DIAGNOSTIC RESULTS     EKG: All EKG's are interpreted by the Observation Physician who either signs or Co-signs this chart in the absence of a cardiologist.    EKG Interpretation    Interpreted by observation physician    Rhythm: normal sinus   Rate: bradycardia  Axis: normal  Ectopy: none  Conduction: normal  ST Segments: no acute change  T Waves: no acute change  Q Waves: none    Clinical Impression: no acute changes    Jamia Lira MD         RADIOLOGY:   I directly visualized the following  images and reviewed the radiologist interpretations:    XR CHEST (2 VW)    Result Date: 8/23/2021  EXAMINATION: TWO XRAY VIEWS OF THE CHEST 8/23/2021 9:16 pm COMPARISON: 02/25/2021 HISTORY: ORDERING SYSTEM PROVIDED HISTORY: concern for sob TECHNOLOGIST PROVIDED HISTORY: concern for sob Chest pain and shortness of breath with bradycardia FINDINGS: No focal consolidation, pleural effusion or pneumothorax. The cardiac silhouette and mediastinal contours are stable. No acute bony abnormality. No acute process. CT HEAD WO CONTRAST    Result Date: 8/23/2021  EXAMINATION: CT OF THE HEAD WITHOUT CONTRAST  8/23/2021 10:58 pm TECHNIQUE: CT of the head was performed without the administration of intravenous contrast. Dose modulation, iterative reconstruction, and/or weight based adjustment of the mA/kV was utilized to reduce the radiation dose to as low as reasonably achievable. COMPARISON: CT head 04/05/2018 HISTORY: ORDERING SYSTEM PROVIDED HISTORY: headache TECHNOLOGIST PROVIDED HISTORY: headache Decision Support Exception - unselect if not a suspected or confirmed emergency medical condition->Emergency Medical Condition (MA) Reason for Exam: Headache Acuity: Unknown Type of Exam: Unknown FINDINGS: BRAIN/VENTRICLES: There is no acute intracranial hemorrhage, mass effect or midline shift. No abnormal extra-axial fluid collection. The gray-white differentiation is maintained. Mild chronic microvascular ischemic changes are seen in the white matter. There is prominence of the cortical sulci and ventricles, from global parenchymal volume loss. There is no evidence of hydrocephalus. ORBITS: The visualized portion of the orbits demonstrate no acute abnormality. SINUSES: The visualized paranasal sinuses and mastoid air cells demonstrate no acute abnormality. SOFT TISSUES/SKULL: Redemonstration postsurgical changes of right frontal craniectomy. There is irregularity with lucency/erosion of the underlying and adjacent calvarium. This is not significantly changed since the prior study. No acute abnormality of the visualized soft tissues. No acute intracranial abnormality. Postsurgical changes in the right frontal bone with stable osseous irregularity and lucency of the underlying and adjacent right calvarium.        LABS:  I have reviewed and interpreted all available lab results. Labs Reviewed   CBC WITH AUTO DIFFERENTIAL - Abnormal; Notable for the following components:       Result Value    RBC 3.69 (*)     Hemoglobin 11.7 (*)     Hematocrit 35.9 (*)     RDW 15.2 (*)     All other components within normal limits   TROPONIN - Abnormal; Notable for the following components:    Troponin, High Sensitivity 26 (*)     All other components within normal limits   BRAIN NATRIURETIC PEPTIDE - Abnormal; Notable for the following components:    Pro-BNP 6,271 (*)     All other components within normal limits   BASIC METABOLIC PANEL - Abnormal; Notable for the following components:    Glucose 126 (*)     BUN 27 (*)     CREATININE 1.96 (*)     GFR Non- 33 (*)     GFR  40 (*)     All other components within normal limits   COVID-19, RAPID   D-DIMER, QUANTITATIVE   TROPONIN         CDU IMPRESSION / PLAN      Kailee Herndon is a 67 y.o. male who presents with       CHF  Patient with elevated BNP. BNP has been elevated previously to these levels but this is slightly higher than his average. Obtain records from [address] [place] [address].  Headache, acute onset, chronic history. History of prior surgical resection of frontal lobe. Patient with CT scan performed. Negative study. Will treat symptomatically. Renal insufficiency  Likely contributory to CHF. Patient for nephrology evaluation. Lower extremity edema  Negative age-adjusted D-dimer. Dopplers of lower extremities being ordered. Doubt DVT PE given D-dimer and clinical picture much more consistent with CHF. Will rule out thromboembolic disease in anticipation of treatment for CHF. Continue home medications and pain control  Monitor vitals, labs, and imaging  DISPO: pending consults and clinical improvement    CONSULTS:    IP CONSULT TO CARDIOLOGY    PROCEDURES:  Not indicated        PATIENT REFERRED TO:    No follow-up provider specified.     --  Duyen Cruz MD   Emergency Medicine Attending    This dictation was generated by voice recognition computer software. Although all attempts are made to edit the dictation for accuracy, there may be errors in the transcription that are not intended.

## 2021-08-24 NOTE — CONSULTS
Attestation signed by      Attending Physician Statement:    I have discussed the care of  Chrys Cranker , including pertinent history and exam findings, with the Cardiology fellow/resident. I have seen and examined the patient and the key elements of all parts of the encounter have been performed by me. I agree with the assessment, plan and orders as documented by the fellow/resident, after I modified exam findings and plan of treatments, and the final version is my approved version of the assessment. Additional Comments: Patient was seen and examined agree with below. Presented with shortness of breath and sinus bradycardia. Previous history of coronary artery disease with recent stress test showing no ischemia and echo showing preserved LV systolic function. Okay to stop Coreg due to sinus bradycardia and amlodipine is added for better blood pressure control. The patient has signs of diastolic heart failure with acute on chronic kidney disease. We will continue IV Lasix and will consider nephrology consult. Will await troponin is type II due to kidney disease. [facility] Cardiology    Consult / H&P               Today's Date: 8/24/2021  Patient Name: Chrys Cranker  Date of admission: 8/23/2021  8:05 PM  Patient's age: 67 y.o., 1945  Admission Dx: Esme Smithhouse [R06.01]  Leg swelling [M79.89]  Chest pain, unspecified type [R07.9]    Reason for Consult:  Cardiac evaluation    Requesting Physician: Delroy Salazar MD    CHIEF COMPLAINT:  Chest heaviness, SOB    History Obtained From:  patient, electronic medical record    HISTORY OF PRESENT ILLNESS:      The patient is a 67 y.o.  male who is admitted to the hospital for chest tightness shortness of breath. Patient states that he has been having shortness of breath for a while now which worsened last night when he was in his house, which are associated with chest heaviness. Patient denies any chest pain or palpitations. Patient denies any headaches. Patient states that he has never lost consciousness but has came close, denies hitting his head or any trauma. Patient states that he also has been having bilateral ankle swelling for months and takes Lasix at home. Patient states that he has orthopnea but no PND. Patient states that he has been tested for EDILBERTO long time ago but was never prescribed home oxygen or CPAP machine. Patient denies drinking alcohol, smoking or any other drug use. Patient follows up at [facility] and has chronic history of bradycardia. Patient was switched from Lopressor to Coreg to eventually amlodipine as the bradycardia did not improve. Patient states that the blood pressure was not controlled with amlodipine and he started taking Lopressor again. Past Medical History:   has a past medical history of Arthritis, Tracy's esophagus, Brunner's gland adenoma, CAD (coronary artery disease), Cancer ([address] CKD (chronic kidney disease), Colon polyp, Dysphagia, Esophagitis, Gastritis, GERD (gastroesophageal reflux disease), Hx of blood clots, Hyperlipidemia, Hypertension, Lipoma, Myocardial infarction ([address] Nausea & vomiting, Pyogenic granuloma, and Vascular abnormality. Past Surgical History:   has a past surgical history that includes Cardiac catheterization; Carotid endarterectomy; Cervical discectomy; Lumbar disc surgery; arthroplasty; arthroplasty; Tonsillectomy; Inguinal hernia repair; Brain tumor excision (2008); Cervical spine surgery (6-25-13); Coronary artery bypass graft (3/6/2014); Upper gastrointestinal endoscopy (4/30/2015); Upper gastrointestinal endoscopy (5/18/16); Upper gastrointestinal endoscopy (01/25/2017); joint replacement; other surgical history (Left, 12/12/2017); Upper gastrointestinal endoscopy (08/08/2018); Upper gastrointestinal endoscopy (8/8/2018); Colonoscopy (11/21/2018); Colonoscopy (N/A, 11/21/2018);  Endoscopy, colon, diagnostic; Upper gastrointestinal endoscopy (09/04/2019); Upper gastrointestinal endoscopy (N/A, 9/4/2019); and Upper gastrointestinal endoscopy (N/A, 5/12/2021). Home Medications:    Prior to Admission medications    Medication Sig Start Date End Date Taking? Authorizing Provider   ondansetron (ZOFRAN-ODT) 4 MG disintegrating tablet DISSOLVE 1 TABLET ON THE TONGUE EVERY 8 HOURS AS NEEDED FOR NAUSEA OR VOMITING 5/10/21  Yes Marlen Dillard MD   furosemide (LASIX) 40 MG tablet Take 1 tablet by mouth daily  Patient taking differently: Take 40 mg by mouth daily Pt takes 1/2 tab daily 2/26/21  Yes Becca Lopez MD   atorvastatin (LIPITOR) 40 MG tablet Take 1 tablet by mouth nightly 2/26/21  Yes Becca Lopez MD   omeprazole (PRILOSEC) 20 MG delayed release capsule TAKE ONE CAPSULE BY MOUTH DAILY 12/1/20  Yes Jone Alonzo MD   nitroGLYCERIN (NITROSTAT) 0.4 MG SL tablet up to max of 3 total doses. If no relief after 1 dose, call 911. 12/3/18  Yes Rhoda Villegas MD   metoprolol tartrate (LOPRESSOR) 25 MG tablet Take 0.25 tablets by mouth 2 times daily  Patient taking differently: Take 12.5 mg by mouth 2 times daily Takes half a pill = 12.5 mg twice daily 4/6/18  Yes Eva Cisneros MD   albuterol (PROVENTIL HFA;VENTOLIN HFA) 108 (90 BASE) MCG/ACT inhaler Inhale 2 puffs into the lungs every 6 hours as needed for Wheezing. Yes Historical Provider, MD   aspirin 81 MG chewable tablet Take 1 tablet by mouth daily. 3/11/14  Yes Edgar Eldridge MD   HYDROcodone-acetaminophen (NORCO)  MG per tablet Take 1 tablet by mouth every 4 hours as needed for Pain . Historical Provider, MD   Ascorbic Acid (VITAMIN C) 1000 MG tablet Take 1,000 mg by mouth daily    Historical Provider, MD   ALPRAZolam (XANAX PO) Take 0.5 tablets by mouth 2 times daily as needed     Historical Provider, MD   fentaNYL (DURAGESIC) 50 MCG/HR Place 1 patch onto the skin every other day.      Historical Provider, MD      Current Facility-Administered Medications: albuterol sulfate  (90 Base) MCG/ACT inhaler 2 puff, 2 puff, Inhalation, Q6H PRN  ALPRAZolam (XANAX) tablet 0.5 mg, 0.5 mg, Oral, BID PRN  ascorbic acid (VITAMIN C) tablet 1,000 mg, 1,000 mg, Oral, Daily  aspirin chewable tablet 81 mg, 81 mg, Oral, Daily  atorvastatin (LIPITOR) tablet 40 mg, 40 mg, Oral, Nightly  furosemide (LASIX) tablet 40 mg, 40 mg, Oral, Daily  HYDROcodone-acetaminophen (NORCO) 5-325 MG per tablet 1 tablet, 1 tablet, Oral, Q4H PRN  [Held by provider] metoprolol tartrate (LOPRESSOR) tablet 6.25 mg, 6.25 mg, Oral, BID  omeprazole (PRILOSEC) 10 MG delayed release capsule 10 mg, 10 mg, Oral, QAM  sodium chloride flush 0.9 % injection 5-40 mL, 5-40 mL, Intravenous, 2 times per day  sodium chloride flush 0.9 % injection 5-40 mL, 5-40 mL, Intravenous, PRN  0.9 % sodium chloride infusion, 25 mL, Intravenous, PRN  enoxaparin (LOVENOX) injection 30 mg, 30 mg, Subcutaneous, Daily  acetaminophen (TYLENOL) tablet 650 mg, 650 mg, Oral, Q4H PRN  ondansetron (ZOFRAN-ODT) disintegrating tablet 4 mg, 4 mg, Oral, Q8H PRN **OR** ondansetron (ZOFRAN) injection 4 mg, 4 mg, Intravenous, Q6H PRN    Allergies:  Vancomycin    Social History:   reports that he quit smoking about 3 years ago. His smoking use included cigarettes. He has a 7.50 pack-year smoking history. He has quit using smokeless tobacco. He reports that he does not drink alcohol and does not use drugs. Family History: family history includes Lung Cancer in his father; Stroke in his maternal grandmother. No h/o sudden cardiac death. No for premature CAD    REVIEW OF SYSTEMS:    · Constitutional: there has been no unanticipated weight loss. There's been No change in energy level, No change in activity level. · Eyes: No visual changes or diplopia. No scleral icterus. · ENT: No Headaches  · Cardiovascular: See HPI  · Respiratory: No previous pulmonary problems, No cough  · Gastrointestinal: No abdominal pain.   No change in bowel or bladder habits. · Genitourinary: No dysuria, trouble voiding, or hematuria. · Musculoskeletal:  No gait disturbance, No weakness or joint complaints. · Integumentary: No rash or pruritis. · Neurological: No headache, diplopia, change in muscle strength, numbness or tingling. No change in gait, balance, coordination, mood, affect, memory, mentation, behavior. · Psychiatric: No anxiety, or depression. · Endocrine: No temperature intolerance. No excessive thirst, fluid intake, or urination. No tremor. · Hematologic/Lymphatic: No abnormal bruising or bleeding, blood clots or swollen lymph nodes. · Allergic/Immunologic: No nasal congestion or hives. PHYSICAL EXAM:      BP (!) 129/56   Pulse (!) 47   Temp 97.2 °F (36.2 °C) (Oral)   Resp 18   Ht 5' 5\" (1.651 m)   Wt 143 lb (64.9 kg)   SpO2 99%   BMI 23.80 kg/m²    Constitutional and General Appearance: alert, cooperative, no distress and appears stated age  HEENT: PERRL, no cervical lymphadenopathy. No masses palpable. Normal oral mucosa  Respiratory:  · Normal excursion and expansion without use of accessory muscles  · Resp Auscultation: Good respiratory effort. No for increased work of breathing. On auscultation: clear to auscultation bilaterally  Cardiovascular:  · The apical impulse is not displaced  · Heart tones are crisp and normal. regular S1 and S2.  · Peripheral pulses are symmetrical and full   Abdomen:   · No masses or tenderness  · Bowel sounds present  Extremities:  ·  No Cyanosis or Clubbing  ·  Lower extremity edema:yes  ·  Skin: Warm and dry  Neurological:  · Alert and oriented.   · Moves all extremities well  · No abnormalities of mood, affect, memory, mentation, or behavior are noted    DATA:    Diagnostics:    EKG: Marked sinus bradycardia  Abnormal ECG    ECHO: 03/19/21: LVEF 02-41%, diastolic dysfunction, RSVP 36mm Hg    Stress Test: 07/24/2021 - no definitive evidence of ishcemia, calculated EF - 55%    Cardiac Angiography: reviewed. Severe MVD with acute thrombotic LCx occlusion      Labs:     CBC:   Recent Labs     08/23/21 2030   WBC 8.5   HGB 11.7*   HCT 35.9*        BMP:   Recent Labs     08/23/21 2030      K 4.7   CO2 26   BUN 27*   CREATININE 1.96*   LABGLOM 33*   GLUCOSE 126*     BNP: No results for input(s): BNP in the last 72 hours. PT/INR: No results for input(s): PROTIME, INR in the last 72 hours. APTT:No results for input(s): APTT in the last 72 hours. CARDIAC ENZYMES:No results for input(s): CKTOTAL, CKMB, CKMBINDEX, TROPONINI in the last 72 hours. FASTING LIPID PANEL:  Lab Results   Component Value Date    HDL 35 02/25/2021    TRIG 60 02/25/2021     LIVER PROFILE:No results for input(s): AST, ALT, LABALBU in the last 72 hours. IMPRESSION:    Patient Active Problem List   Diagnosis    Kyphosis of cervical region    ST elevation myocardial infarction (STEMI) of inferoposterior wall (HCC)    Cardiogenic shock (HCC)    Dyslipidemia    Smoker    Coronary artery disease involving coronary bypass graft of native heart    CKD (chronic kidney disease) stage 3, GFR 30-59 ml/min    S/P CABG (coronary artery bypass graft)    Anemia due to gastrointestinal blood loss    Radiculopathy    Ex-smoker    Nausea and vomiting    GERD (gastroesophageal reflux disease)    Dysphagia    MALT (mucosa associated lymphoid tissue) (HCC)    Pulmonary infiltrate    Gastritis    Pyogenic granuloma    Esophagitis    Lipoma    Brunner's gland adenoma    Left-sided carotid artery disease (HCC)    Subclavian artery stenosis, left (HCC)    Skull mass    Chest pain    Tracy's esophagus    Colon polyp    Acute on chronic diastolic heart failure (HCC)    Elevated d-dimer    Anxiety    Narcotic bowel syndrome (HCC)    Nausea    MALToma (HCC)    Orthopnea       Impression:  1. Bradycardia  2. Elevated Troponins  3. Acute on chronic diastolic failure  4.  CAD s/p CABG 3/6/14  5. HTN  6. Dyslipidemia  7. CARIN on CKD Stage 3      RECOMMENDATIONS:  1. Discontinue BB  2. Elevated troponins likely secondary from type II ischemia  3. Continue the patient on aspirin and lipitor  4. Diurese the patient with Lasix  5. We will start amlodipine for BP control. 6. Keep K>4, Mg>2      Discussed with patient and Nurse. Bonnielee Sandifer, MD  Internal Medicine Resident, PGY-2  [address], [place]  0/73/8854,7:52 AM    Electronically signed by Bonnielee Sandifer, MD on 8/24/2021 at 7:51 [address] [place] [address] [facility]      [phone]

## 2021-08-24 NOTE — CARE COORDINATION
Case Management Initial Discharge Plan  Hillary Herndon,             Met with:patient to discuss discharge plans. Information verified: address, contacts, phone number, , insurance Yes  Insurance Provider: medicare/gpm    Emergency Contact/Next of Kin name & number: wife magdalena   Who are involved in patient's support system? family     PCP: Josué Lopez  Date of last visit: 1 month      Discharge Planning    Living Arrangements:     but lives separately     Home has 1 stories      Patient able to perform ADL's:Independent    Current Services (outpatient & in home) none  DME equipment: none      Is patient receiving oral anticoagulation therapy?  asa    Potential Assistance Needed:   f/u pcp    Evaluate for home care: not home bound       Prior SNF/Rehab Placement and Facility: no       Evaluation: no    Expected Discharge date:       Patient expects to be discharged to:   home    If home: is the family and/or caregiver wiling & able to provide support at home? yes  Who will be providing this support? wife       Transportation provider: wife  Transportation arrangements needed for discharge: No    Readmission Risk              Risk of Unplanned Readmission:  0             Does patient have a readmission risk score greater than 14?: No  If yes, follow-up appointment must be made within 7 days of discharge.      Goals of Care: safe transition plan       Educated yes on transitional options, provided freedom of choice and are agreeable with plan      Discharge Plan:  but lives separately           Electronically signed by Maritza Pop RN on 21 at 5:52 PM EDT

## 2021-08-25 VITALS
RESPIRATION RATE: 20 BRPM | HEART RATE: 45 BPM | TEMPERATURE: 98.1 F | HEIGHT: 65 IN | DIASTOLIC BLOOD PRESSURE: 65 MMHG | SYSTOLIC BLOOD PRESSURE: 138 MMHG | OXYGEN SATURATION: 100 % | WEIGHT: 143 LBS | BODY MASS INDEX: 23.82 KG/M2

## 2021-08-25 PROBLEM — R06.01 ORTHOPNEA: Status: RESOLVED | Noted: 2021-08-23 | Resolved: 2021-08-25

## 2021-08-25 LAB
ANION GAP SERPL CALCULATED.3IONS-SCNC: 10 MMOL/L (ref 9–17)
BUN BLDV-MCNC: 30 MG/DL (ref 8–23)
BUN/CREAT BLD: ABNORMAL (ref 9–20)
CALCIUM SERPL-MCNC: 9 MG/DL (ref 8.6–10.4)
CHLORIDE BLD-SCNC: 102 MMOL/L (ref 98–107)
CO2: 26 MMOL/L (ref 20–31)
CREAT SERPL-MCNC: 2.01 MG/DL (ref 0.7–1.2)
GFR AFRICAN AMERICAN: 39 ML/MIN
GFR NON-AFRICAN AMERICAN: 32 ML/MIN
GFR SERPL CREATININE-BSD FRML MDRD: ABNORMAL ML/MIN/{1.73_M2}
GFR SERPL CREATININE-BSD FRML MDRD: ABNORMAL ML/MIN/{1.73_M2}
GLUCOSE BLD-MCNC: 90 MG/DL (ref 70–99)
POTASSIUM SERPL-SCNC: 4.4 MMOL/L (ref 3.7–5.3)
SODIUM BLD-SCNC: 138 MMOL/L (ref 135–144)

## 2021-08-25 PROCEDURE — 6370000000 HC RX 637 (ALT 250 FOR IP): Performed by: STUDENT IN AN ORGANIZED HEALTH CARE EDUCATION/TRAINING PROGRAM

## 2021-08-25 PROCEDURE — 2580000003 HC RX 258: Performed by: STUDENT IN AN ORGANIZED HEALTH CARE EDUCATION/TRAINING PROGRAM

## 2021-08-25 PROCEDURE — 80048 BASIC METABOLIC PNL TOTAL CA: CPT

## 2021-08-25 PROCEDURE — 6360000002 HC RX W HCPCS: Performed by: STUDENT IN AN ORGANIZED HEALTH CARE EDUCATION/TRAINING PROGRAM

## 2021-08-25 PROCEDURE — 96372 THER/PROPH/DIAG INJ SC/IM: CPT

## 2021-08-25 PROCEDURE — G0378 HOSPITAL OBSERVATION PER HR: HCPCS

## 2021-08-25 RX ORDER — FUROSEMIDE 20 MG/1
20 TABLET ORAL
Status: DISCONTINUED | OUTPATIENT
Start: 2021-08-25 | End: 2021-08-25 | Stop reason: HOSPADM

## 2021-08-25 RX ORDER — FUROSEMIDE 40 MG/1
80 TABLET ORAL EVERY OTHER DAY
Qty: 60 TABLET | Refills: 3 | Status: SHIPPED | OUTPATIENT
Start: 2021-08-25 | End: 2021-11-09 | Stop reason: ALTCHOICE

## 2021-08-25 RX ORDER — FUROSEMIDE 40 MG/1
40 TABLET ORAL
Status: DISCONTINUED | OUTPATIENT
Start: 2021-08-26 | End: 2021-08-25 | Stop reason: HOSPADM

## 2021-08-25 RX ORDER — AMLODIPINE BESYLATE 5 MG/1
5 TABLET ORAL DAILY
Qty: 30 TABLET | Refills: 3 | Status: SHIPPED | OUTPATIENT
Start: 2021-08-26

## 2021-08-25 RX ADMIN — AMLODIPINE BESYLATE 5 MG: 5 TABLET ORAL at 08:33

## 2021-08-25 RX ADMIN — ENOXAPARIN SODIUM 30 MG: 30 INJECTION SUBCUTANEOUS at 08:33

## 2021-08-25 RX ADMIN — FUROSEMIDE 40 MG: 40 TABLET ORAL at 08:33

## 2021-08-25 RX ADMIN — OXYCODONE HYDROCHLORIDE AND ACETAMINOPHEN 1000 MG: 500 TABLET ORAL at 08:33

## 2021-08-25 RX ADMIN — OMEPRAZOLE 10 MG: 10 CAPSULE, DELAYED RELEASE ORAL at 11:01

## 2021-08-25 RX ADMIN — ASPIRIN 81 MG: 81 TABLET, CHEWABLE ORAL at 08:33

## 2021-08-25 RX ADMIN — SODIUM CHLORIDE, PRESERVATIVE FREE 10 ML: 5 INJECTION INTRAVENOUS at 08:33

## 2021-08-25 ASSESSMENT — PAIN SCALES - GENERAL
PAINLEVEL_OUTOF10: 0
PAINLEVEL_OUTOF10: 0

## 2021-08-25 NOTE — PROGRESS NOTES
[facility]  Progress Note                   Date:   8/25/2021  Patient name: Jason Pa  Date of admission:  8/23/2021  8:05 PM  MRN:   [id number]  YOB: 1945  PCP: Corey Harris    Reason for Admission: Orthopnea [R06.01]  Leg swelling [M79.89]  Chest pain, unspecified type [R07.9]    Subjective:       Clinical Changes /Abnormalities: Seen & examined alone in room lying quietly in bed. Denies any chest pain or SOB. States he hates being in the bed. Labs, vitals, & tele reviewed. Remains SB. Denies any dizziness. Review of Systems    Medications:   Scheduled Meds:   vitamin C  1,000 mg Oral Daily    aspirin  81 mg Oral Daily    atorvastatin  40 mg Oral Nightly    furosemide  40 mg Oral Daily    omeprazole  10 mg Oral QAM    sodium chloride flush  5-40 mL IntraVENous 2 times per day    enoxaparin  30 mg Subcutaneous Daily    amLODIPine  5 mg Oral Daily     Continuous Infusions:   sodium chloride       CBC:   Recent Labs     08/23/21 2030   WBC 8.5   HGB 11.7*        BMP:    Recent Labs     08/23/21 2030 08/24/21  0913 08/25/21  0655    140 138   K 4.7 4.4 4.4    103 102   CO2 26 27 26   BUN 27* 27* 30*   CREATININE 1.96* 1.88* 2.01*   GLUCOSE 126* 93 90     Hepatic:No results for input(s): AST, ALT, ALB, BILITOT, ALKPHOS in the last 72 hours. Troponin:   Recent Labs     08/23/21 2030 08/23/21  2302   TROPHS 26* 20     BNP: No results for input(s): BNP in the last 72 hours. Lipids: No results for input(s): CHOL, HDL in the last 72 hours. Invalid input(s): LDLCALCU  INR: No results for input(s): INR in the last 72 hours. Objective:   Vitals: /65   Pulse (!) 45   Temp 98.2 °F (36.8 °C) (Oral)   Resp 14   Ht 5' 5\" (1.651 m)   Wt 143 lb (64.9 kg)   SpO2 95%   BMI 23.80 kg/m²   General appearance: alert and cooperative with exam  HEENT: Head: Normocephalic, no lesions, without obvious abnormality.   Neck:no JVD, trachea midline, no adenopathy  Lungs: Clear to auscultation  Heart: Regular rate and rhythm, s1/s2 auscultated, no murmurs  Abdomen: soft, non-tender, bowel sounds active  Extremities: no edema  Neurologic: not done    ECHO: 03/19/21: LVEF 52-29%, diastolic dysfunction, RSVP 36mm Hg     Stress Test: 07/24/2021 - no definitive evidence of ishcemia, calculated EF - 55%     Cardiac Angiography: reviewed. Severe MVD with acute thrombotic LCx occlusion    Assessment / Acute Cardiac Problems:   1. Acute on chronic diastolic CHF exacerbation  2. Sinus bradycardia  3. Preserved LVEF  4. Hx of CAD/MVD  5. CARIN on CKD3    Patient Active Problem List:     Kyphosis of cervical region     ST elevation myocardial infarction (STEMI) of inferoposterior wall (HCC)     Cardiogenic shock (HCC)     Dyslipidemia     Smoker     Coronary artery disease involving coronary bypass graft of native heart     CKD (chronic kidney disease) stage 3, GFR 30-59 ml/min     Hx of CABG     Anemia due to gastrointestinal blood loss     Radiculopathy     Ex-smoker     Nausea and vomiting     GERD (gastroesophageal reflux disease)     Dysphagia     MALT (mucosa associated lymphoid tissue) (HCC)     Pulmonary infiltrate     Gastritis     Pyogenic granuloma     Esophagitis     Lipoma     Brunner's gland adenoma     Left-sided carotid artery disease (HCC)     Subclavian artery stenosis, left (HCC)     Skull mass     Chest pain     Tracy's esophagus     Colon polyp     Acute on chronic diastolic heart failure (HCC)     Elevated d-dimer     Anxiety     Narcotic bowel syndrome (HCC)     Nausea     MALToma (HCC)     Orthopnea     Leg swelling     Essential hypertension      Plan of Treatment:   1. Stable and feeling much better. Clinically no volume overload on exam today  2. Appriciate nephrology recommendations. Will alternate Lasix with 40mg/80mg on discharge. 3. Continue PO ASA, statin. 4. BP stable. Continue Norvasc  5. OK for discharge from CV standpoint.  F/U with his cardiologist, Dr. Fan Hogan, in 1-2 weeks.      Electronically signed by JOSELINE Rodriguez CNP on 8/25/2021 at 9:39 AM  [address]  [phone]

## 2021-08-25 NOTE — PROGRESS NOTES
OBS/CDU   RESIDENT NOTE      Patients PCP is:  Diana Walker        SUBJECTIVE      No acute events overnight. Patient seen at bedside today. He is eating lunch and notes that he is feeling better. He at times still has some shortness of breath but believes that it is improving. He reports that his wife and him are  but busy each other often, otherwise he lives at home alone. He believes that he is safe at home and has no issues going home alone. He feels that he is ready for discharge. PHYSICAL EXAM      General: NAD, AO X 3, elderly male who is easily distracted  Heent: EMOI, PERRL  Neck: SUPPLE, NO JVD, trachea midline  Cardiovascular: RRR, S1 S2, S3 gallop, bradycardia  Pulmonary: CTAB, NO SOB, no wheezes rales or rhonchi  Abdomen: SOFT, NTTP, ND, +BS  Extremities: 1+ pitting edema lower extremities  Neuro / Psych: No focal neurologic deficits, patient appears anxious and easily distracted. Poor eye contact. Continually pacing around room. PERTINENT TEST /EXAMS      I have reviewed all available laboratory results.     MEDICATIONS CURRENT   [START ON 8/26/2021] furosemide (LASIX) tablet 40 mg, Once per day on Sun Tue Thu Sat  furosemide (LASIX) tablet 20 mg, Once per day on Mon Wed Fri  albuterol sulfate  (90 Base) MCG/ACT inhaler 2 puff, Q6H PRN  ALPRAZolam (XANAX) tablet 0.5 mg, BID PRN  ascorbic acid (VITAMIN C) tablet 1,000 mg, Daily  aspirin chewable tablet 81 mg, Daily  atorvastatin (LIPITOR) tablet 40 mg, Nightly  HYDROcodone-acetaminophen (NORCO) 5-325 MG per tablet 1 tablet, Q4H PRN  omeprazole (PRILOSEC) 10 MG delayed release capsule 10 mg, QAM  sodium chloride flush 0.9 % injection 5-40 mL, 2 times per day  sodium chloride flush 0.9 % injection 5-40 mL, PRN  0.9 % sodium chloride infusion, PRN  enoxaparin (LOVENOX) injection 30 mg, Daily  acetaminophen (TYLENOL) tablet 650 mg, Q4H PRN  ondansetron (ZOFRAN-ODT) disintegrating tablet 4 mg, Q8H PRN   Or  ondansetron

## 2021-08-25 NOTE — DISCHARGE SUMMARY
CDU Discharge Summary        Patient:  Black Rizo  YOB: 1945    MRN: [id number]   Acct: [de-identified]    Primary Care Physician: Halle Hunter    Admit date:  8/23/2021  8:05 PM  Discharge date: 8/25/2021  2:45 PM     Discharge Diagnoses:     Acute shortness of breath due to CHF  Improved with lasix    Follow-up:  Call today/tomorrow for a follow up appointment with Halle Hunter , or return to the Emergency Room with worsening symptoms    Stressed to patient the importance of following up with primary care doctor for further workup/management of symptoms. Pt verbalizes understanding and agrees with plan. Discharge Medications:  Changes to medications        [facility] Medication Instructions VAX:[id number]    Printed on:08/25/21 6928   Medication Information                      albuterol (PROVENTIL HFA;VENTOLIN HFA) 108 (90 BASE) MCG/ACT inhaler  Inhale 2 puffs into the lungs every 6 hours as needed for Wheezing. ALPRAZolam (XANAX PO)  Take 0.5 tablets by mouth 2 times daily as needed              amLODIPine (NORVASC) 5 MG tablet  Take 1 tablet by mouth daily             Ascorbic Acid (VITAMIN C) 1000 MG tablet  Take 1,000 mg by mouth daily             aspirin 81 MG chewable tablet  Take 1 tablet by mouth daily. atorvastatin (LIPITOR) 40 MG tablet  Take 1 tablet by mouth nightly             fentaNYL (DURAGESIC) 50 MCG/HR  Place 1 patch onto the skin every other day. furosemide (LASIX) 40 MG tablet  Take 1 tablet by mouth daily             furosemide (LASIX) 40 MG tablet  Take 2 tablets by mouth every other day             HYDROcodone-acetaminophen (NORCO)  MG per tablet  Take 1 tablet by mouth every 4 hours as needed for Pain .             metoprolol tartrate (LOPRESSOR) 25 MG tablet  Take 0.25 tablets by mouth 2 times daily             nitroGLYCERIN (NITROSTAT) 0.4 MG SL tablet  up to max of 3 total doses.  If no relief after 1 dose, call 911. omeprazole (PRILOSEC) 20 MG delayed release capsule  TAKE ONE CAPSULE BY MOUTH DAILY             ondansetron (ZOFRAN-ODT) 4 MG disintegrating tablet  DISSOLVE 1 TABLET ON THE TONGUE EVERY 8 HOURS AS NEEDED FOR NAUSEA OR VOMITING                 Diet:  ADULT DIET; Regular , Advance as tolerated     Activity:  As tolerated    Consultants: IP CONSULT TO CARDIOLOGY  IP CONSULT TO NEPHROLOGY    Procedures:  Not indicated     Diagnostic Test:   Results for orders placed or performed during the hospital encounter of 08/23/21   COVID-19, Rapid    Specimen: Nasopharyngeal Swab   Result Value Ref Range    Specimen Description . NASOPHARYNGEAL SWAB     SARS-CoV-2, Rapid Not Detected Not Detected   CBC WITH AUTO DIFFERENTIAL   Result Value Ref Range    WBC 8.5 3.5 - 11.3 k/uL    RBC 3.69 (L) 4.21 - 5.77 m/uL    Hemoglobin 11.7 (L) 13.0 - 17.0 g/dL    Hematocrit 35.9 (L) 40.7 - 50.3 %    MCV 97.3 82.6 - 102.9 fL    MCH 31.7 25.2 - 33.5 pg    MCHC 32.6 28.4 - 34.8 g/dL    RDW 15.2 (H) 11.8 - 14.4 %    Platelets 102 382 - 646 k/uL    MPV 10.8 8.1 - 13.5 fL    NRBC Automated 0.0 0.0 per 100 WBC    Differential Type NOT REPORTED     Seg Neutrophils 65 36 - 65 %    Lymphocytes 24 24 - 43 %    Monocytes 7 3 - 12 %    Eosinophils % 3 1 - 4 %    Basophils 1 0 - 2 %    Immature Granulocytes 0 0 %    Segs Absolute 5.44 1.50 - 8.10 k/uL    Absolute Lymph # 2.01 1.10 - 3.70 k/uL    Absolute Mono # 0.61 0.10 - 1.20 k/uL    Absolute Eos # 0.27 0.00 - 0.44 k/uL    Basophils Absolute 0.09 0.00 - 0.20 k/uL    Absolute Immature Granulocyte 0.03 0.00 - 0.30 k/uL    WBC Morphology NOT REPORTED     RBC Morphology ANISOCYTOSIS PRESENT     Platelet Estimate NOT REPORTED    Troponin   Result Value Ref Range    Troponin, High Sensitivity 26 (H) 0 - 22 ng/L    Troponin T NOT REPORTED <0.03 ng/mL    Troponin Interp NOT REPORTED    Brain Natriuretic Peptide   Result Value Ref Range    Pro-BNP 6,271 (H) <300 pg/mL    BNP Interpretation - 1.5 g/dL    Gamma Globulin % 14 9 - 20 %    Total Prot. Sum 5.9 (L) 6.3 - 8.2 g/dL    Total Prot. Sum,% 101 98 - 102 %    Protein Electrophoresis, Serum PENDING     Pathologist PENDING    BASIC METABOLIC PANEL   Result Value Ref Range    Glucose 90 70 - 99 mg/dL    BUN 30 (H) 8 - 23 mg/dL    CREATININE 2.01 (H) 0.70 - 1.20 mg/dL    Bun/Cre Ratio NOT REPORTED 9 - 20    Calcium 9.0 8.6 - 10.4 mg/dL    Sodium 138 135 - 144 mmol/L    Potassium 4.4 3.7 - 5.3 mmol/L    Chloride 102 98 - 107 mmol/L    CO2 26 20 - 31 mmol/L    Anion Gap 10 9 - 17 mmol/L    GFR Non-African American 32 (L) >60 mL/min    GFR  39 (L) >60 mL/min    GFR Comment          GFR Staging NOT REPORTED    EKG 12 Lead   Result Value Ref Range    Ventricular Rate 47 BPM    Atrial Rate 47 BPM    P-R Interval 184 ms    QRS Duration 74 ms    Q-T Interval 454 ms    QTc Calculation (Bazett) 401 ms    R Axis 74 degrees    T Axis 105 degrees   EKG 12 Lead   Result Value Ref Range    Ventricular Rate 43 BPM    Atrial Rate 43 BPM    P-R Interval 156 ms    QRS Duration 98 ms    Q-T Interval 536 ms    QTc Calculation (Bazett) 452 ms    P Axis 36 degrees    R Axis 73 degrees    T Axis 80 degrees     XR CHEST (2 VW)    Result Date: 8/23/2021  EXAMINATION: TWO XRAY VIEWS OF THE CHEST 8/23/2021 9:16 pm COMPARISON: 02/25/2021 HISTORY: ORDERING SYSTEM PROVIDED HISTORY: concern for sob TECHNOLOGIST PROVIDED HISTORY: concern for sob Chest pain and shortness of breath with bradycardia FINDINGS: No focal consolidation, pleural effusion or pneumothorax. The cardiac silhouette and mediastinal contours are stable. No acute bony abnormality. No acute process.      CT HEAD WO CONTRAST    Result Date: 8/23/2021  EXAMINATION: CT OF THE HEAD WITHOUT CONTRAST  8/23/2021 10:58 pm TECHNIQUE: CT of the head was performed without the administration of intravenous contrast. Dose modulation, iterative reconstruction, and/or weight based adjustment of the mA/kV was utilized to reduce the radiation dose to as low as reasonably achievable. COMPARISON: CT head 04/05/2018 HISTORY: ORDERING SYSTEM PROVIDED HISTORY: headache TECHNOLOGIST PROVIDED HISTORY: headache Decision Support Exception - unselect if not a suspected or confirmed emergency medical condition->Emergency Medical Condition (MA) Reason for Exam: Headache Acuity: Unknown Type of Exam: Unknown FINDINGS: BRAIN/VENTRICLES: There is no acute intracranial hemorrhage, mass effect or midline shift. No abnormal extra-axial fluid collection. The gray-white differentiation is maintained. Mild chronic microvascular ischemic changes are seen in the white matter. There is prominence of the cortical sulci and ventricles, from global parenchymal volume loss. There is no evidence of hydrocephalus. ORBITS: The visualized portion of the orbits demonstrate no acute abnormality. SINUSES: The visualized paranasal sinuses and mastoid air cells demonstrate no acute abnormality. SOFT TISSUES/SKULL: Redemonstration postsurgical changes of right frontal craniectomy. There is irregularity with lucency/erosion of the underlying and adjacent calvarium. This is not significantly changed since the prior study. No acute abnormality of the visualized soft tissues. No acute intracranial abnormality. Postsurgical changes in the right frontal bone with stable osseous irregularity and lucency of the underlying and adjacent right calvarium. US RENAL COMPLETE    Result Date: 8/24/2021  EXAMINATION: RETROPERITONEAL ULTRASOUND OF THE KIDNEYS AND URINARY BLADDER 8/24/2021 COMPARISON: None HISTORY: ORDERING SYSTEM PROVIDED HISTORY: Elevated creatinine TECHNOLOGIST PROVIDED HISTORY: Elevated creatinine FINDINGS: Kidneys: The right kidney measures 10.4 cm in length and the left kidney measures 10.5 cm markers in length. Kidneys demonstrate mildly increased cortical echogenicity. No evidence of hydronephrosis or intrarenal stones.  Bladder: Unremarkable appearance of the bladder. No significant post void residual.     Medical renal disease     VL DUP LOWER EXTREMITY VENOUS BILATERAL    Result Date: 8/24/2021    [facility]  Vascular Lower Extremities DVT Study Procedure   Patient Name Dian Lindsey  Date of Study           08/24/2021               R   Date of      1945  Gender                  Male  Birth   Age          67 year(s)  Race                       Room Number  6710   Corporate ID [id number]  #   Patient Mary Jo Alvarez [de-identified]  #   MR #         [id number]     Sonographer             Arletta Boeck, RVT   Accession #  [id number]  Interpreting Physician  Kahlil James   Referring                Referring Physician     Vivian Garcia MD  Nurse  Practitioner  Procedure Type of Study:   Veins: Lower Extremities DVT Study, Venous Scan Lower Bilateral.  Indications for Study:Calf tenderness. Patient Status: In Patient. Conclusions   Summary   Simultaneous real time imaging utilizing B-Mode, color doppler and  spectral waveform analysis was performed on the bilateral lower  extremities for venous examination of the deep and superficial systems. Findings are:   Right:  No evidence of deep or superficial venous thrombosis. Left:  No evidence of deep venous thrombosis. Superficial venous thrombosis of great saphenous vein proximal to harvest  site.    Signature   ----------------------------------------------------------------  Electronically signed by Arletta Boeck, RVT(Sonographer) on  08/24/2021 09:58 AM  ----------------------------------------------------------------   ----------------------------------------------------------------  Electronically signed by Ashlyn Wang(Interpreting  physician) on 08/24/2021 07:19 PM  ----------------------------------------------------------------  Findings:   Right Impression:                    Left Impression:  The common femoral, femoral,         The common femoral, femoral,  popliteal and tibial veins           popliteal and tibial veins  demonstrate normal compressibility   demonstrate normal compressibility  and augmentation. and augmentation. Normal compressibility of the great  Normal compressibility of the small  saphenous vein. saphenous vein. Normal compressibility of the small  Left great saphenous vein harvested. saphenous vein. Enlarged lymph nodes are noted at the  Enlarged lymph nodes are noted at    left groin level. the right groin level. The greater saphenous vein was                                       non-compressible proximal to harvest                                       site. Risk Factors History +-----------------+----------+---------------------------------------------+ ! Diagnosis        ! Date      ! Comments                                     ! +-----------------+----------+---------------------------------------------+ ! Previous Scan    !          !9/8/08- rt gsv non-compressible from the mid ! !                 !          !calf to just above the ankle. ! ! !          !Pt states that he has had blood clots in the ! !                 !          !past but came in now with complaint of       ! !                 !          !shortness of breath but no pain in the legs. ! +-----------------+----------+---------------------------------------------+ ! Previous Scan    !10/28/2012! beryl-wnl                                      ! +-----------------+----------+---------------------------------------------+ ! CAD              ! !MI                                           ! +-----------------+----------+---------------------------------------------+ ! CHF              ! !                                             ! +-----------------+----------+---------------------------------------------+ ! Cerebrovascular  !           !H/O CEA ! !disease          !          !                                             ! +-----------------+----------+---------------------------------------------+ ! Previous Scan    !04/14/2014! RT--WNL                                      ! ! ! !LT--Proximal greater saphenous vein          ! !                 !          !non-compressible proximal to harvest site. ! +-----------------+----------+---------------------------------------------+   - The patient's risk factor(s) include: previous angina/MI, previous CABG     and dyslipidemia. - The patient has a former tobacco history. Allergies   - Allergy:*Unlisted(Drug). Comments:VANCOMYCIN Velocities are measured in cm/s ; Diameters are measured in cm Right Lower Extremities DVT Study Measurements Right 2D Measurements +------------------------------------+----------+---------------+----------+ ! Location                            ! Visualized! Compressibility! Thrombosis! +------------------------------------+----------+---------------+----------+ ! Common Femoral                      !Yes       ! Yes            ! None      ! +------------------------------------+----------+---------------+----------+ ! Prox Femoral                        !Yes       ! Yes            ! None      ! +------------------------------------+----------+---------------+----------+ ! Mid Femoral                         !Yes       ! Yes            ! None      ! +------------------------------------+----------+---------------+----------+ ! Dist Femoral                        !Yes       ! Yes            ! None      ! +------------------------------------+----------+---------------+----------+ ! Deep Femoral                        !Yes       ! Yes            ! None      ! +------------------------------------+----------+---------------+----------+ ! Popliteal                           !Yes       ! Yes            ! None      ! +------------------------------------+----------+---------------+----------+ ! Sapheno Femoral Junction            ! Yes       ! Yes            ! None      ! +------------------------------------+----------+---------------+----------+ ! PTV                                 ! Yes       ! Yes            ! None      ! +------------------------------------+----------+---------------+----------+ ! Peroneal                            !Yes       ! Yes            ! None      ! +------------------------------------+----------+---------------+----------+ ! Gastroc                             ! Yes       ! Yes            ! None      ! +------------------------------------+----------+---------------+----------+ ! GSV Thigh                           ! Yes       ! Yes            ! None      ! +------------------------------------+----------+---------------+----------+ ! GSV Knee                            ! Yes       ! Yes            ! None      ! +------------------------------------+----------+---------------+----------+ ! GSV Ankle                           ! Yes       ! Yes            ! None      ! +------------------------------------+----------+---------------+----------+ ! SSV                                 ! Yes       ! Yes            ! None      ! +------------------------------------+----------+---------------+----------+ Right Doppler Measurements +---------------------------+------+------+--------------------------------+ ! Location                   ! Signal!Reflux! Reflux (msec)                   ! +---------------------------+------+------+--------------------------------+ ! Common Femoral             !Phasic!      !                                ! +---------------------------+------+------+--------------------------------+ ! Prox Femoral               !Phasic!      !                                ! +---------------------------+------+------+--------------------------------+ ! Popliteal                  !Phasic!      ! ! +---------------------------+------+------+--------------------------------+ Left Lower Extremities DVT Study Measurements Left 2D Measurements +------------------------------------+----------+---------------+----------+ ! Location                            ! Visualized! Compressibility! Thrombosis! +------------------------------------+----------+---------------+----------+ ! Common Femoral                      !Yes       ! Yes            ! None      ! +------------------------------------+----------+---------------+----------+ ! Prox Femoral                        !Yes       ! Yes            ! None      ! +------------------------------------+----------+---------------+----------+ ! Mid Femoral                         !Yes       ! Yes            ! None      ! +------------------------------------+----------+---------------+----------+ ! Dist Femoral                        !Yes       ! Yes            ! None      ! +------------------------------------+----------+---------------+----------+ ! Deep Femoral                        !Yes       ! Yes            ! None      ! +------------------------------------+----------+---------------+----------+ ! Popliteal                           !Yes       ! Yes            ! None      ! +------------------------------------+----------+---------------+----------+ ! Sapheno Femoral Junction            ! Yes       ! Yes            ! None      ! +------------------------------------+----------+---------------+----------+ ! PTV                                 ! Yes       ! Yes            ! None      ! +------------------------------------+----------+---------------+----------+ ! Peroneal                            !Partial   !Yes            ! None      ! +------------------------------------+----------+---------------+----------+ ! Gastroc                             ! Yes       ! No             !Chronic   ! +------------------------------------+----------+---------------+----------+ ! GSV Thigh !Yes       !No             !None      ! +------------------------------------+----------+---------------+----------+ ! GSV Knee                            ! No        !               !          ! +------------------------------------+----------+---------------+----------+ ! GSV Ankle                           ! No        !               !          ! +------------------------------------+----------+---------------+----------+ ! SSV                                 ! Yes       ! Yes            ! None      ! +------------------------------------+----------+---------------+----------+ Left Doppler Measurements +---------------------------+------+------+--------------------------------+ ! Location                   ! Signal!Reflux! Reflux (msec)                   ! +---------------------------+------+------+--------------------------------+ ! Common Femoral             !Phasic!      !                                ! +---------------------------+------+------+--------------------------------+ ! Prox Femoral               !Phasic!      !                                ! +---------------------------+------+------+--------------------------------+ ! Popliteal                  !Phasic!      !                                ! +---------------------------+------+------+--------------------------------+          Physical Exam:    General appearance - NAD, AOx 3   Lungs -CTAB, no R/R/R  Heart - RRR, no M/R/G  Abdomen - Soft, NT/ND  Neurological:  MAEx4, No focal motor deficit, sensory loss  Extremities - Cap refil <2 sec in all ext., no edema  Skin -warm, dry      Hospital Course:  Clinical course has improved, labs and imaging reviewed. Chris Montoya originally presented to the hospital on 8/23/2021  8:05 PM. with SOB. At that time it was determined that He required further observation and treatment. He was admitted and labs and imaging were followed daily. Imaging results as above. He is medically stable to be discharged. Disposition: Home    Patient stated that they will not drive themselves home from the hospital if they have gotten pain killers/ narcotics earlier that day and that they will arrange for transportation on their own or work with the  for a ride. Patient counseled NOT to drive while under the influence of narcotics/ pain killers. Condition: Good    Patient stable and ready for discharge home. I have discussed plan of care with patient and they are in understanding. They were instructed to read discharge paperwork. All of their questions and concerns were addressed. Time Spent: 0 day      --  Ludwig Rowell DO  Emergency Medicine Resident Physician    This dictation was generated by voice recognition computer software. Although all attempts are made to edit the dictation for accuracy, there may be errors in the transcription that are not intended.

## 2021-08-25 NOTE — PROGRESS NOTES
[facility]  CDU / OBSERVATION eNCOUnter  Attending NOte       I performed a history and physical examination of the patient and discussed management with the resident. I reviewed the residents note and agree with the documented findings and plan of care. Any areas of disagreement are noted on the chart. I was personally present for the key portions of any procedures. I have documented in the chart those procedures where I was not present during the key portions. I have reviewed the nurses notes. I agree with the chief complaint, past medical history, past surgical history, allergies, medications, social and family history as documented unless otherwise noted below. The Family history, social history, and ROS are effectively unchanged since admission unless noted elsewhere in the chart. The 8year-old female presented to the emergency department with shortness of breath, and was found to have sinus bradycardia. Known history of coronary artery disease with a relatively recent stress test that showed no ischemia. Patient was also noted on echo to have preserved left ventricular systolic function. Audiology has elected to stop her Coreg due to the sinus bradycardia, and added amlodipine for better blood pressure control. Nephrology has been consulted due to the patient's acute on chronic kidney disease. Nephrology agrees that we should continue the patient's Lasix, and suggested changing to 40 mg alternating with 80 mg daily. Plan for outpatient follow-up with nephrology as well. The patient is feeling much better this AM. No clinical signs of fluid overload. Discharge planning.     Chacha Lujan MD  Attending Emergency  Physician

## 2021-08-26 LAB
ALBUMIN (CALCULATED): 3.9 G/DL (ref 3.2–5.2)
ALBUMIN PERCENT: 66 % (ref 45–65)
ALPHA 1 PERCENT: 3 % (ref 3–6)
ALPHA 2 PERCENT: 9 % (ref 6–13)
ALPHA-1-GLOBULIN: 0.2 G/DL (ref 0.1–0.4)
ALPHA-2-GLOBULIN: 0.5 G/DL (ref 0.5–0.9)
BETA GLOBULIN: 0.5 G/DL (ref 0.5–1.1)
BETA PERCENT: 9 % (ref 11–19)
GAMMA GLOBULIN %: 14 % (ref 9–20)
GAMMA GLOBULIN: 0.8 G/DL (ref 0.5–1.5)
PATHOLOGIST: ABNORMAL
PROTEIN ELECTROPHORESIS, SERUM: ABNORMAL
TOTAL PROT. SUM,%: 101 % (ref 98–102)
TOTAL PROT. SUM: 5.9 G/DL (ref 6.3–8.2)
TOTAL PROTEIN: 5.9 G/DL (ref 6.4–8.3)

## 2021-09-02 DIAGNOSIS — C88.4 MALT (MUCOSA ASSOCIATED LYMPHOID TISSUE) (HCC): ICD-10-CM

## 2021-09-02 RX ORDER — ONDANSETRON 4 MG/1
TABLET, ORALLY DISINTEGRATING ORAL
Qty: 90 TABLET | Refills: 3 | Status: SHIPPED | OUTPATIENT
Start: 2021-09-02 | End: 2021-12-27

## 2021-12-08 ENCOUNTER — OFFICE VISIT (OUTPATIENT)
Dept: GASTROENTEROLOGY | Age: 76
End: 2021-12-08
Payer: MEDICARE

## 2021-12-08 VITALS
DIASTOLIC BLOOD PRESSURE: 52 MMHG | WEIGHT: 132 LBS | SYSTOLIC BLOOD PRESSURE: 138 MMHG | BODY MASS INDEX: 21.97 KG/M2 | HEART RATE: 73 BPM | TEMPERATURE: 98.7 F

## 2021-12-08 DIAGNOSIS — C88.4 MALTOMA (HCC): ICD-10-CM

## 2021-12-08 DIAGNOSIS — T40.601A NARCOTIC BOWEL SYNDROME (HCC): ICD-10-CM

## 2021-12-08 DIAGNOSIS — R94.2 ABNORMAL PET OF LEFT LUNG: ICD-10-CM

## 2021-12-08 DIAGNOSIS — R11.0 NAUSEA: ICD-10-CM

## 2021-12-08 DIAGNOSIS — F41.9 ANXIETY: ICD-10-CM

## 2021-12-08 DIAGNOSIS — R63.4 WEIGHT LOSS: Primary | ICD-10-CM

## 2021-12-08 DIAGNOSIS — K63.89 NARCOTIC BOWEL SYNDROME (HCC): ICD-10-CM

## 2021-12-08 PROCEDURE — G8484 FLU IMMUNIZE NO ADMIN: HCPCS | Performed by: INTERNAL MEDICINE

## 2021-12-08 PROCEDURE — G8420 CALC BMI NORM PARAMETERS: HCPCS | Performed by: INTERNAL MEDICINE

## 2021-12-08 PROCEDURE — 1123F ACP DISCUSS/DSCN MKR DOCD: CPT | Performed by: INTERNAL MEDICINE

## 2021-12-08 PROCEDURE — 4040F PNEUMOC VAC/ADMIN/RCVD: CPT | Performed by: INTERNAL MEDICINE

## 2021-12-08 PROCEDURE — 99214 OFFICE O/P EST MOD 30 MIN: CPT | Performed by: INTERNAL MEDICINE

## 2021-12-08 PROCEDURE — 1036F TOBACCO NON-USER: CPT | Performed by: INTERNAL MEDICINE

## 2021-12-08 PROCEDURE — G8427 DOCREV CUR MEDS BY ELIG CLIN: HCPCS | Performed by: INTERNAL MEDICINE

## 2021-12-08 ASSESSMENT — ENCOUNTER SYMPTOMS
CONSTIPATION: 1
NAUSEA: 1
ABDOMINAL DISTENTION: 0

## 2021-12-08 NOTE — PROGRESS NOTES
Medical,Family, and Social History reviewed and does contribute to the patient presenting condition. Patient's PMH/PSH,SH,PSYCH Hx, MEDs, ALLERGIES, and ROS were all reviewed and updated in the appropriate sections.     PAST MEDICAL HISTORY:  Past Medical History:   Diagnosis Date    Arthritis     fentanyl patch    Tracy's esophagus     Brunner's gland adenoma     CAD (coronary artery disease) 03/06/2014    stents 2002   CABG 3/6/14    Cancer ([address]     stomach    CKD (chronic kidney disease)     Colon polyp 11/21/2018    hyperplastic polyp    Dysphagia     Esophagitis     Gastritis     GERD (gastroesophageal reflux disease)     Hx of blood clots     3 clots right leg, left eye    Hyperlipidemia     Hypertension     Lipoma     Myocardial infarction (HCC)     STRESS DONE    Nausea & vomiting     Pyogenic granuloma     Vascular abnormality     CAROTID       Past Surgical History:   Procedure Laterality Date    ARTHROPLASTY      RIGHT KNEE X3-INFECTION    ARTHROPLASTY      LEFT KNEE    BRAIN TUMOR EXCISION  2008    excision    CARDIAC CATHETERIZATION      CAROTID ENDARTERECTOMY      LEFT    CERVICAL DISCECTOMY      CERVICAL SPINE SURGERY  6-25-13    ACF 3-4, 4-5    COLONOSCOPY  11/21/2018    hyperplastic polyp    COLONOSCOPY N/A 11/21/2018    COLONOSCOPY POLYPECTOMY SNARE/COLD BIOPSY performed by Ross Shea MD at [place] [id number]  3/6/2014    CABG X 3    ENDOSCOPY, COLON, DIAGNOSTIC      INGUINAL HERNIA REPAIR      LEFT    JOINT REPLACEMENT      Bilat knees    LUMBAR DISC SURGERY      HARDWARE    OTHER SURGICAL HISTORY Left 12/12/2017     1) Arch aortography 2) Selective left subclavian angiography 3) Left subclavian artery angioplasty with 7 mm x 40 mm and 8 mm x 40 mm Merrill balloons     TONSILLECTOMY      UPPER GASTROINTESTINAL ENDOSCOPY  4/30/2015    UPPER GASTROINTESTINAL ENDOSCOPY  5/18/16    lipomatous lump; esophagitis; pyogenic granuloma; mild gastritis    UPPER GASTROINTESTINAL ENDOSCOPY  01/25/2017    lipoma; prominet Brunner's gland; gastritis    UPPER GASTROINTESTINAL ENDOSCOPY  08/08/2018    CASTILLO'S    UPPER GASTROINTESTINAL ENDOSCOPY  8/8/2018    EGD BIOPSY performed by Kalen Garcia MD at [facility].  09/04/2019    EGD BIOPSY     UPPER GASTROINTESTINAL ENDOSCOPY N/A 9/4/2019    EGD BIOPSY performed by Kalen Garcia MD at [facility]. N/A 5/12/2021    EGD BIOPSY performed by Kalen Garcia MD at [facility] [place]:    Current Outpatient Medications:     furosemide (LASIX) 40 MG tablet, Take 1 tablet by mouth daily, Disp: 60 tablet, Rfl: 3    ondansetron (ZOFRAN-ODT) 4 MG disintegrating tablet, DISSOLVE 1 TABLET ON THE TONGUE EVERY 8 HOURS AS NEEDED FOR NAUSEA OR VOMITING, Disp: 90 tablet, Rfl: 3    amLODIPine (NORVASC) 5 MG tablet, Take 1 tablet by mouth daily, Disp: 30 tablet, Rfl: 3    omeprazole (PRILOSEC) 20 MG delayed release capsule, TAKE ONE CAPSULE BY MOUTH DAILY, Disp: 180 capsule, Rfl: 1    nitroGLYCERIN (NITROSTAT) 0.4 MG SL tablet, up to max of 3 total doses. If no relief after 1 dose, call 911., Disp: 25 tablet, Rfl: 3    HYDROcodone-acetaminophen (NORCO)  MG per tablet, Take 1 tablet by mouth every 4 hours as needed for Pain ., Disp: , Rfl:     Ascorbic Acid (VITAMIN C) 1000 MG tablet, Take 1,000 mg by mouth daily, Disp: , Rfl:     ALPRAZolam (XANAX PO), Take 0.5 tablets by mouth 2 times daily as needed , Disp: , Rfl:     albuterol (PROVENTIL HFA;VENTOLIN HFA) 108 (90 BASE) MCG/ACT inhaler, Inhale 2 puffs into the lungs every 6 hours as needed for Wheezing., Disp: , Rfl:     fentaNYL (DURAGESIC) 50 MCG/HR, Place 1 patch onto the skin every other day. , Disp: , Rfl:     aspirin 81 MG chewable tablet, Take 1 tablet by mouth daily. , Disp: 30 tablet, Rfl: 11    ALLERGIES:   Allergies   Allergen Reactions    Vancomycin      AFFECTS KIDNEYS         FAMILY HISTORY:       Problem Relation Age of Onset    Lung Cancer Father     Stroke Maternal Grandmother          SOCIAL HISTORY:   Social History     Socioeconomic History    Marital status:      Spouse name: Not on file    Number of children: Not on file    Years of education: Not on file    Highest education level: Not on file   Occupational History    Not on file   Tobacco Use    Smoking status: Former Smoker     Packs/day: 0.25     Years: 30.00     Pack years: 7.50     Types: Cigarettes     Quit date: 5/1/2018     Years since quitting: 3.6    Smokeless tobacco: Former User    Tobacco comment: QUIT CIGARETTES/SMOKES CIGARS   Vaping Use    Vaping Use: Never used   Substance and Sexual Activity    Alcohol use: No     Comment: QUIT; H/O ETOH abuse    Drug use: No    Sexual activity: Not on file   Other Topics Concern    Not on file   Social History Narrative    Not on file     Social Determinants of Health     Financial Resource Strain:     Difficulty of Paying Living Expenses: Not on file   Food Insecurity:     Worried About [address] in the Last Year: Not on file    [address] in the Last Year: Not on file   Transportation Needs:     Lack of Transportation (Medical): Not on file    Lack of Transportation (Non-Medical):  Not on file   Physical Activity:     Days of Exercise per Week: Not on file    Minutes of Exercise per Session: Not on file   Stress:     Feeling of Stress : Not on file   Social Connections:     Frequency of Communication with Friends and Family: Not on file    Frequency of Social Gatherings with Friends and Family: Not on file    Attends Rastafarian Services: Not on file    Active Member of Clubs or Organizations: Not on file    Attends Club or Organization Meetings: Not on file    Marital Status: Not on file   Intimate Partner Violence:     Fear of Current or Ex-Partner: Not on file    Emotionally Abused: Not on file   Dianna Palm Physically Abused: Not on file    Sexually Abused: Not on file   Housing Stability:     Unable to Pay for Housing in the Last Year: Not on file    Number of Places Lived in the Last Year: Not on file    Unstable Housing in the Last Year: Not on file         REVIEW OF SYSTEMS:         Review of Systems   Constitutional: Positive for fatigue and unexpected weight change. Gastrointestinal: Positive for constipation and nausea. Negative for abdominal distention. Neurological: Positive for headaches. Hematological: Bruises/bleeds easily. Psychiatric/Behavioral: Positive for sleep disturbance. PHYSICAL EXAMINATION: Vital signs reviewed per the nursing documentation. BP (!) 138/52   Pulse 73   Temp 98.7 °F (37.1 °C)   Wt 132 lb (59.9 kg)   BMI 21.97 kg/m²   Body mass index is 21.97 kg/m². Physical Exam  Nursing note reviewed. Constitutional:       Appearance: He is well-developed. Comments: Very anxious   Hyperactive    HENT:      Head: Normocephalic and atraumatic. Eyes:      Conjunctiva/sclera: Conjunctivae normal.      Pupils: Pupils are equal, round, and reactive to light. Cardiovascular:      Rate and Rhythm: Normal rate and regular rhythm. Pulmonary:      Effort: Pulmonary effort is normal.      Breath sounds: Rales present. Abdominal:      General: Bowel sounds are normal.      Palpations: Abdomen is soft. Comments: Mild TENDER, NON DISTENTED  LIVER SPLEEN AND HERNIAS ARE NOT  PALPABLE  BOWEL SOUNDS ARE POSITIVE      Genitourinary:     Rectum: Normal.   Musculoskeletal:         General: Normal range of motion. Cervical back: Normal range of motion and neck supple. Skin:     General: Skin is warm. Neurological:      Mental Status: He is alert and oriented to person, place, and time. Deep Tendon Reflexes: Reflexes are normal and symmetric.            LABORATORY DATA: Reviewed  Lab Results   Component Value Date    WBC 8.5 08/23/2021    HGB 11.7 (L) 08/23/2021    HCT 35.9 (L) 08/23/2021    MCV 97.3 08/23/2021     08/23/2021     08/25/2021    K 4.4 08/25/2021     08/25/2021    CO2 26 08/25/2021    BUN 30 (H) 08/25/2021    CREATININE 2.01 (H) 08/25/2021    LABALBU 4.1 07/09/2021    BILITOT 0.59 07/09/2021    ALKPHOS 100 07/09/2021    AST 26 07/09/2021    ALT 21 07/09/2021    INR 1.1 02/24/2021         Lab Results   Component Value Date    RBC 3.69 (L) 08/23/2021    HGB 11.7 (L) 08/23/2021    MCV 97.3 08/23/2021    MCH 31.7 08/23/2021    MCHC 32.6 08/23/2021    RDW 15.2 (H) 08/23/2021    MPV 10.8 08/23/2021    BASOPCT 1 08/23/2021    LYMPHSABS 2.01 08/23/2021    MONOSABS 0.61 08/23/2021    NEUTROABS 5.44 08/23/2021    EOSABS 0.27 08/23/2021    BASOSABS 0.09 08/23/2021         DIAGNOSTIC TESTING:     No results found. Assessment  1. Weight loss    2. MALToma ([address]    3. Nausea    4. Narcotic bowel syndrome ([address]    5. Anxiety    6.  Abnormal PET of left lung        Plan    Patient wife taking him to emergency room at [facility] at this time as he is having biopsy tomorrow as well as not feeling very well patient appears very anxious with throbbing headaches probably will need an evaluation emergency room at this time    He was to continue rest of the management for his gastrointestinal problem including continuing proton pump inhibitor therapy    He is on multiple narcotic medication I wonder if he has some of these issues connected to his narcotic usage/questionable withdrawal    Continue Zofran on as needed basis    Some lifestyle dietary modifications also explained to them    I will see him back in couple of months in my office he was asked to call me if things change or has any other GI issues in the meantime    He will be worked up for his positive PET scan finding in the pulmonary and neck area in the meantime    I communicated with the patient and/or health care decision maker about   Details of this discussion including any medical advice provided:YES      I affirm this is a Patient Initiated Episode with an Established Patient who has not had a related appointment within my department in the past 7 days or scheduled within the next 24 hours. Total Time: minutes: 21-30 minutes    Note: not billable if this call serves to triage the patient into an appointment for the relevant concern      Thank you for allowing me to participate in the care of Mr. Geoff Mclaughlin. For any further questions please do not hesitate to contact me. I have reviewed and agree with the ROS entered by the MA/LPN.          Flaquita Angulo MD,   Board Certified in Gastroenterology and [address] [place] [address] Gastroenterology  Office #: [phone]

## 2021-12-13 RX ORDER — OMEPRAZOLE 20 MG/1
CAPSULE, DELAYED RELEASE ORAL
Qty: 180 CAPSULE | Refills: 1 | Status: SHIPPED | OUTPATIENT
Start: 2021-12-13

## 2021-12-24 DIAGNOSIS — C88.4 MALT (MUCOSA ASSOCIATED LYMPHOID TISSUE) (HCC): ICD-10-CM

## 2021-12-27 RX ORDER — ONDANSETRON 4 MG/1
TABLET, ORALLY DISINTEGRATING ORAL
Qty: 90 TABLET | Refills: 3 | Status: SHIPPED | OUTPATIENT
Start: 2021-12-27 | End: 2022-04-07

## 2022-04-06 ENCOUNTER — TELEPHONE (OUTPATIENT)
Dept: ONCOLOGY | Age: 77
End: 2022-04-06

## 2022-04-06 DIAGNOSIS — C88.4 MALT (MUCOSA ASSOCIATED LYMPHOID TISSUE) (HCC): ICD-10-CM

## 2022-04-06 NOTE — TELEPHONE ENCOUNTER
TRIAGE NURSE GAVE WRITER NOTE TO SCHEDULE PATIENT FOR FOLLOW UP MD VISIT THAT SHOULD HAVE BEEN SCHEDULED 1/2022. WRITER CALLED PATIENT ON 4/6/22 TO GET HIM SCHEDULED, BUT PATIENT STATED HE HAS ANOTHER APPOINTMENT COMING UP AND DOES NOT REMEMBER WHEN IT IS, SO HE WILL CALL US/ST NEERAJ CORREA BACK. PATIENT NEEDS SCHEDULED WITH DR Christine Sandoval AND [facility] CMP.

## 2022-04-06 NOTE — TELEPHONE ENCOUNTER
CHECKING CHART FOR MED REFILL.   PT DUE TO RETURN FOR MD VISIT WITH DR MEDELLIN AROUND 1/16/2022  PT LAST SEEN 7/16/2021    MESSAGE GIVEN TO DESK TO SCHEDULE PT, DUE TO NO APPT IN Epic    REFILL REQUEST FROM PT'S PHARMACY FOR [facility]

## 2022-04-07 RX ORDER — ONDANSETRON 4 MG/1
TABLET, ORALLY DISINTEGRATING ORAL
Qty: 90 TABLET | Refills: 3 | Status: SHIPPED | OUTPATIENT
Start: 2022-04-07

## 2024-03-19 NOTE — PROGRESS NOTES
CLINICAL PHARMACY NOTE: MEDS TO BEDS    Total # of Prescriptions Filled: 1   The following medications were delivered to the patient:  · Amlodipine besy. 5 mg tab    Additional Documentation:Medication delivered to the patient in his room. 348. Paid cash. Furosemide was too soon to fill. Attending Only AIRAM without on-site Attending supervision Resident/Fellow with telephonic supervision

## (undated) DEVICE — BLOCK BITE 60FR RUBBER ADLT DENTAL

## (undated) DEVICE — BASIN EMSIS 700ML GRAPHITE PLAS KID SHP GRAD

## (undated) DEVICE — CO2 CANNULA,SUPERSOFT, ADLT,7'O2,7'CO2: Brand: MEDLINE

## (undated) DEVICE — BAG SPECIMEN BIOHAZARD 6IN X 9IN

## (undated) DEVICE — CANNULA NSL AD TBNG L7FT PVC STR NONFLARED PRNG O2 DEL W STD

## (undated) DEVICE — TUBING, SUCTION, 1/4" X 12', STRAIGHT: Brand: MEDLINE

## (undated) DEVICE — GLOVE SURG 8 11.7IN BEAD CUF LIGHT BRN SENSICARE LTX FREE

## (undated) DEVICE — MEDICINE CUP, GRADUATED, STER: Brand: MEDLINE

## (undated) DEVICE — FORCEPS BX L240CM JAW DIA2.4MM ORNG L CAP W/ NDL DISP RAD

## (undated) DEVICE — CUP MED 1OZ CLR POLYPR FEED GRAD W/O LID

## (undated) DEVICE — TRAP SURG QUAD PARABOLA SLOT DSGN SFTY SCRN TRAPEASE

## (undated) DEVICE — FORCEPS BX L240CM JAW DIA2.2MM RAD JAW 4 HOT DISP

## (undated) DEVICE — GAUZE,SPONGE,4"X4",16PLY,STRL,LF,10/TRAY: Brand: MEDLINE

## (undated) DEVICE — Device: Brand: DEFENDO VALVE AND CONNECTOR KIT

## (undated) DEVICE — JELLY,LUBE,STERILE,FLIP TOP,TUBE,2-OZ: Brand: MEDLINE

## (undated) DEVICE — SYRINGE MED 50ML LUERLOCK TIP

## (undated) DEVICE — ADAPTER TBNG LUER STUB 15 GA INTMED

## (undated) DEVICE — 9165 UNIVERSAL PATIENT PLATE: Brand: 3M™

## (undated) DEVICE — CONMED DISPOSABLE GASTROINTESTINAL CYTOLOGY BRUSH, STRAIGHT HANDLE, 2.5 MM X 160 CM: Brand: CONMED